# Patient Record
Sex: MALE | Race: WHITE | NOT HISPANIC OR LATINO | Employment: FULL TIME | URBAN - METROPOLITAN AREA
[De-identification: names, ages, dates, MRNs, and addresses within clinical notes are randomized per-mention and may not be internally consistent; named-entity substitution may affect disease eponyms.]

---

## 2017-02-20 ENCOUNTER — OFFICE VISIT (OUTPATIENT)
Dept: URGENT CARE | Facility: CLINIC | Age: 57
End: 2017-02-20
Payer: COMMERCIAL

## 2017-02-20 PROCEDURE — 99203 OFFICE O/P NEW LOW 30 MIN: CPT

## 2018-11-13 ENCOUNTER — OFFICE VISIT (OUTPATIENT)
Dept: URGENT CARE | Facility: CLINIC | Age: 58
End: 2018-11-13
Payer: COMMERCIAL

## 2018-11-13 VITALS
HEIGHT: 68 IN | TEMPERATURE: 97.6 F | OXYGEN SATURATION: 98 % | RESPIRATION RATE: 18 BRPM | SYSTOLIC BLOOD PRESSURE: 128 MMHG | DIASTOLIC BLOOD PRESSURE: 78 MMHG | WEIGHT: 180 LBS | BODY MASS INDEX: 27.28 KG/M2 | HEART RATE: 86 BPM

## 2018-11-13 DIAGNOSIS — J01.90 ACUTE SINUSITIS, RECURRENCE NOT SPECIFIED, UNSPECIFIED LOCATION: Primary | ICD-10-CM

## 2018-11-13 PROCEDURE — 99213 OFFICE O/P EST LOW 20 MIN: CPT | Performed by: PHYSICIAN ASSISTANT

## 2018-11-13 RX ORDER — AMOXICILLIN AND CLAVULANATE POTASSIUM 875; 125 MG/1; MG/1
1 TABLET, FILM COATED ORAL EVERY 12 HOURS SCHEDULED
Qty: 20 TABLET | Refills: 0 | Status: SHIPPED | OUTPATIENT
Start: 2018-11-13 | End: 2018-11-23

## 2018-11-13 RX ORDER — MULTIVIT-MINERALS/FA/LYCOPENE 0.4 MG-6
TABLET ORAL
COMMUNITY

## 2018-11-13 NOTE — PROGRESS NOTES
[facility]        NAME: Teresa Torrez is a 62 y o  male  : 1960    MRN: [id number]  DATE: 2018  TIME: 10:37 AM    Assessment and Plan   Acute sinusitis, recurrence not specified, unspecified location [J01 90]  1  Acute sinusitis, recurrence not specified, unspecified location  amoxicillin-clavulanate (AUGMENTIN) 875-125 mg per tablet         Patient Instructions     Follow up with PCP in 3-5 days  Proceed to  ER if symptoms worsen  Chief Complaint     Chief Complaint   Patient presents with    Cold Like Symptoms     C/O sinus congestion, post nasal drip, cough x 2 days  History of Present Illness       62 y o  Male presents with sinus congestion and pressure x 2 days  Patient states the symptoms are worsening as he gets this once a year and is treated with Augmentin  Has tried a OTC nasal spray with one day of relief  Denies fever, chills, n/v         Review of Systems   Review of Systems   Constitutional: Negative for chills, fatigue and fever  HENT: Positive for congestion, postnasal drip and sinus pain  Negative for ear pain, sore throat and trouble swallowing  Eyes: Negative for pain, discharge and redness  Respiratory: Positive for cough  Negative for chest tightness, shortness of breath and wheezing  Cardiovascular: Negative for chest pain, palpitations and leg swelling  Gastrointestinal: Negative for abdominal pain, diarrhea, nausea and vomiting  Musculoskeletal: Negative for arthralgias, joint swelling and myalgias  Skin: Negative for rash  Neurological: Negative for dizziness, weakness, numbness and headaches           Current Medications       Current Outpatient Prescriptions:     amoxicillin-clavulanate (AUGMENTIN) 875-125 mg per tablet, Take 1 tablet by mouth every 12 (twelve) hours for 10 days, Disp: 20 tablet, Rfl: 0    Multiple Vitamins-Minerals (PX MENS MULTIVITAMINS) TABS, Take by mouth, Disp: , Rfl:     Current Allergies Allergies as of 11/13/2018 - Reviewed 11/13/2018   Allergen Reaction Noted    Tetracycline  02/20/2017            The following portions of the patient's history were reviewed and updated as appropriate: allergies, current medications, past family history, past medical history, past social history, past surgical history and problem list      History reviewed  No pertinent past medical history  Past Surgical History:   Procedure Laterality Date    ORIF FINGER / THUMB FRACTURE         No family history on file  Medications have been verified  Objective   /78   Pulse 86   Temp 97 6 °F (36 4 °C) (Tympanic)   Resp 18   Ht 5' 8" (1 727 m)   Wt 81 6 kg (180 lb)   SpO2 98%   BMI 27 37 kg/m²        Physical Exam     Physical Exam   Constitutional: He is oriented to person, place, and time  He appears well-developed and well-nourished  No distress  HENT:   Head: Normocephalic  Right Ear: Tympanic membrane and external ear normal    Left Ear: Tympanic membrane and external ear normal    Nose: Nose normal    Mouth/Throat: Uvula is midline and mucous membranes are normal  Posterior oropharyngeal erythema (post nasal drip posterior pharynx) present  Eyes: Pupils are equal, round, and reactive to light  Conjunctivae and EOM are normal    Neck: Normal range of motion  Neck supple  Cardiovascular: Normal rate, regular rhythm and normal heart sounds  No murmur heard  Pulmonary/Chest: Effort normal and breath sounds normal  No respiratory distress  He has no wheezes  Abdominal: Soft  Bowel sounds are normal  There is no tenderness  Musculoskeletal: Normal range of motion  Lymphadenopathy:     He has no cervical adenopathy  Neurological: He is alert and oriented to person, place, and time  He has normal reflexes  Skin: Skin is warm and dry  Psychiatric: He has a normal mood and affect  Nursing note and vitals reviewed

## 2019-04-25 ENCOUNTER — TRANSCRIBE ORDERS (OUTPATIENT)
Dept: URGENT CARE | Facility: CLINIC | Age: 59
End: 2019-04-25

## 2019-04-25 ENCOUNTER — APPOINTMENT (OUTPATIENT)
Dept: LAB | Facility: CLINIC | Age: 59
End: 2019-04-25
Payer: COMMERCIAL

## 2019-04-25 DIAGNOSIS — E78.5 HYPERLIPIDEMIA, UNSPECIFIED HYPERLIPIDEMIA TYPE: ICD-10-CM

## 2019-04-25 DIAGNOSIS — N42.9 DISORDER OF PROSTATE: ICD-10-CM

## 2019-04-25 DIAGNOSIS — R53.83 FATIGUE, UNSPECIFIED TYPE: ICD-10-CM

## 2019-04-25 DIAGNOSIS — Z79.1 NSAID LONG-TERM USE: Primary | ICD-10-CM

## 2019-04-25 LAB
ALBUMIN SERPL BCP-MCNC: 3.7 G/DL (ref 3.5–5)
ALP SERPL-CCNC: 66 U/L (ref 46–116)
ALT SERPL W P-5'-P-CCNC: 49 U/L (ref 12–78)
ANION GAP SERPL CALCULATED.3IONS-SCNC: 4 MMOL/L (ref 4–13)
AST SERPL W P-5'-P-CCNC: 39 U/L (ref 5–45)
BILIRUB SERPL-MCNC: 0.45 MG/DL (ref 0.2–1)
BUN SERPL-MCNC: 16 MG/DL (ref 5–25)
CALCIUM SERPL-MCNC: 8.7 MG/DL (ref 8.3–10.1)
CHLORIDE SERPL-SCNC: 107 MMOL/L (ref 100–108)
CHOLEST SERPL-MCNC: 204 MG/DL (ref 50–200)
CO2 SERPL-SCNC: 26 MMOL/L (ref 21–32)
CREAT SERPL-MCNC: 1.11 MG/DL (ref 0.6–1.3)
ERYTHROCYTE [DISTWIDTH] IN BLOOD BY AUTOMATED COUNT: 13.4 % (ref 11.6–15.1)
GFR SERPL CREATININE-BSD FRML MDRD: 73 ML/MIN/1.73SQ M
GLUCOSE P FAST SERPL-MCNC: 111 MG/DL (ref 65–99)
HCT VFR BLD AUTO: 41.8 % (ref 36.5–49.3)
HDLC SERPL-MCNC: 49 MG/DL (ref 40–60)
HGB BLD-MCNC: 14.1 G/DL (ref 12–17)
LDLC SERPL CALC-MCNC: 138 MG/DL (ref 0–100)
MCH RBC QN AUTO: 31 PG (ref 26.8–34.3)
MCHC RBC AUTO-ENTMCNC: 33.7 G/DL (ref 31.4–37.4)
MCV RBC AUTO: 92 FL (ref 82–98)
NONHDLC SERPL-MCNC: 155 MG/DL
PLATELET # BLD AUTO: 271 THOUSANDS/UL (ref 149–390)
PMV BLD AUTO: 10.5 FL (ref 8.9–12.7)
POTASSIUM SERPL-SCNC: 4.3 MMOL/L (ref 3.5–5.3)
PROT SERPL-MCNC: 7.4 G/DL (ref 6.4–8.2)
PSA SERPL-MCNC: 0.2 NG/ML (ref 0–4)
RBC # BLD AUTO: 4.55 MILLION/UL (ref 3.88–5.62)
SODIUM SERPL-SCNC: 137 MMOL/L (ref 136–145)
T4 FREE SERPL-MCNC: 0.88 NG/DL (ref 0.76–1.46)
TRIGL SERPL-MCNC: 85 MG/DL
TSH SERPL DL<=0.05 MIU/L-ACNC: 3.33 UIU/ML (ref 0.36–3.74)
WBC # BLD AUTO: 7.68 THOUSAND/UL (ref 4.31–10.16)

## 2019-04-25 PROCEDURE — 84439 ASSAY OF FREE THYROXINE: CPT

## 2019-04-25 PROCEDURE — G0103 PSA SCREENING: HCPCS

## 2019-04-25 PROCEDURE — 85027 COMPLETE CBC AUTOMATED: CPT

## 2019-04-25 PROCEDURE — 36415 COLL VENOUS BLD VENIPUNCTURE: CPT

## 2019-04-25 PROCEDURE — 80061 LIPID PANEL: CPT

## 2019-04-25 PROCEDURE — 84443 ASSAY THYROID STIM HORMONE: CPT

## 2019-04-25 PROCEDURE — 80053 COMPREHEN METABOLIC PANEL: CPT

## 2019-07-19 ENCOUNTER — APPOINTMENT (OUTPATIENT)
Dept: LAB | Facility: CLINIC | Age: 59
End: 2019-07-19
Payer: COMMERCIAL

## 2019-07-19 ENCOUNTER — TRANSCRIBE ORDERS (OUTPATIENT)
Dept: LAB | Facility: CLINIC | Age: 59
End: 2019-07-19

## 2019-07-19 DIAGNOSIS — E78.5 HYPERLIPIDEMIA, UNSPECIFIED HYPERLIPIDEMIA TYPE: ICD-10-CM

## 2019-07-19 DIAGNOSIS — Z11.59 NEED FOR HEPATITIS C SCREENING TEST: ICD-10-CM

## 2019-07-19 DIAGNOSIS — E78.5 HYPERLIPIDEMIA, UNSPECIFIED HYPERLIPIDEMIA TYPE: Primary | ICD-10-CM

## 2019-07-19 LAB
CHOLEST SERPL-MCNC: 188 MG/DL (ref 50–200)
HDLC SERPL-MCNC: 63 MG/DL (ref 40–60)
LDLC SERPL CALC-MCNC: 104 MG/DL (ref 0–100)
NONHDLC SERPL-MCNC: 125 MG/DL
TRIGL SERPL-MCNC: 104 MG/DL

## 2019-07-19 PROCEDURE — 80061 LIPID PANEL: CPT

## 2019-07-19 PROCEDURE — 36415 COLL VENOUS BLD VENIPUNCTURE: CPT

## 2019-07-19 PROCEDURE — 86803 HEPATITIS C AB TEST: CPT

## 2019-07-20 LAB — HCV AB SER QL: NORMAL

## 2019-09-19 ENCOUNTER — TRANSCRIBE ORDERS (OUTPATIENT)
Dept: ADMINISTRATIVE | Facility: HOSPITAL | Age: 59
End: 2019-09-19

## 2019-09-19 DIAGNOSIS — G56.03 BILATERAL CARPAL TUNNEL SYNDROME: Primary | ICD-10-CM

## 2019-10-15 ENCOUNTER — HOSPITAL ENCOUNTER (OUTPATIENT)
Dept: NEUROLOGY | Facility: CLINIC | Age: 59
Discharge: HOME | End: 2019-10-15
Payer: COMMERCIAL

## 2019-10-15 DIAGNOSIS — G56.03 BILATERAL CARPAL TUNNEL SYNDROME: ICD-10-CM

## 2019-10-15 PROCEDURE — 95912 NRV CNDJ TEST 11-12 STUDIES: CPT | Performed by: PHYSICAL MEDICINE & REHABILITATION

## 2019-10-15 PROCEDURE — 95886 MUSC TEST DONE W/N TEST COMP: CPT | Performed by: PHYSICAL MEDICINE & REHABILITATION

## 2021-02-10 ENCOUNTER — LAB (OUTPATIENT)
Dept: LAB | Facility: CLINIC | Age: 61
End: 2021-02-10
Payer: COMMERCIAL

## 2021-02-10 ENCOUNTER — TRANSCRIBE ORDERS (OUTPATIENT)
Dept: URGENT CARE | Facility: CLINIC | Age: 61
End: 2021-02-10

## 2021-02-10 DIAGNOSIS — Z79.1 ENCOUNTER FOR LONG-TERM (CURRENT) USE OF NON-STEROIDAL ANTI-INFLAMMATORIES: Primary | ICD-10-CM

## 2021-02-10 DIAGNOSIS — E78.5 HYPERLIPIDEMIA, UNSPECIFIED HYPERLIPIDEMIA TYPE: ICD-10-CM

## 2021-02-10 DIAGNOSIS — N42.9 DISEASE OF PROSTATE: ICD-10-CM

## 2021-02-10 LAB
ALBUMIN SERPL BCP-MCNC: 4.3 G/DL (ref 3.5–5)
ALP SERPL-CCNC: 66 U/L (ref 46–116)
ALT SERPL W P-5'-P-CCNC: 43 U/L (ref 12–78)
ANION GAP SERPL CALCULATED.3IONS-SCNC: 7 MMOL/L (ref 4–13)
AST SERPL W P-5'-P-CCNC: 26 U/L (ref 5–45)
BILIRUB SERPL-MCNC: 0.5 MG/DL (ref 0.2–1)
BUN SERPL-MCNC: 15 MG/DL (ref 5–25)
CALCIUM SERPL-MCNC: 9.2 MG/DL (ref 8.3–10.1)
CHLORIDE SERPL-SCNC: 108 MMOL/L (ref 100–108)
CHOLEST SERPL-MCNC: 183 MG/DL (ref 50–200)
CO2 SERPL-SCNC: 25 MMOL/L (ref 21–32)
CREAT SERPL-MCNC: 1.05 MG/DL (ref 0.6–1.3)
ERYTHROCYTE [DISTWIDTH] IN BLOOD BY AUTOMATED COUNT: 13.3 % (ref 11.6–15.1)
GFR SERPL CREATININE-BSD FRML MDRD: 77 ML/MIN/1.73SQ M
GLUCOSE P FAST SERPL-MCNC: 119 MG/DL (ref 65–99)
HCT VFR BLD AUTO: 43.4 % (ref 36.5–49.3)
HDLC SERPL-MCNC: 53 MG/DL
HGB BLD-MCNC: 14.6 G/DL (ref 12–17)
LDLC SERPL CALC-MCNC: 114 MG/DL (ref 0–100)
MCH RBC QN AUTO: 30.9 PG (ref 26.8–34.3)
MCHC RBC AUTO-ENTMCNC: 33.6 G/DL (ref 31.4–37.4)
MCV RBC AUTO: 92 FL (ref 82–98)
NONHDLC SERPL-MCNC: 130 MG/DL
PLATELET # BLD AUTO: 249 THOUSANDS/UL (ref 149–390)
PMV BLD AUTO: 10.3 FL (ref 8.9–12.7)
POTASSIUM SERPL-SCNC: 4.1 MMOL/L (ref 3.5–5.3)
PROT SERPL-MCNC: 7.4 G/DL (ref 6.4–8.2)
PSA SERPL-MCNC: 0.2 NG/ML (ref 0–4)
RBC # BLD AUTO: 4.73 MILLION/UL (ref 3.88–5.62)
SODIUM SERPL-SCNC: 140 MMOL/L (ref 136–145)
TRIGL SERPL-MCNC: 78 MG/DL
WBC # BLD AUTO: 7.55 THOUSAND/UL (ref 4.31–10.16)

## 2021-02-10 PROCEDURE — 85027 COMPLETE CBC AUTOMATED: CPT

## 2021-02-10 PROCEDURE — G0103 PSA SCREENING: HCPCS

## 2021-02-10 PROCEDURE — 80061 LIPID PANEL: CPT

## 2021-02-10 PROCEDURE — 36415 COLL VENOUS BLD VENIPUNCTURE: CPT

## 2021-02-10 PROCEDURE — 80053 COMPREHEN METABOLIC PANEL: CPT

## 2021-08-30 ENCOUNTER — APPOINTMENT (OUTPATIENT)
Dept: RADIOLOGY | Facility: CLINIC | Age: 61
End: 2021-08-30
Payer: COMMERCIAL

## 2021-08-30 ENCOUNTER — APPOINTMENT (OUTPATIENT)
Dept: LAB | Facility: CLINIC | Age: 61
End: 2021-08-30
Payer: COMMERCIAL

## 2021-08-30 DIAGNOSIS — M79.672 LEFT FOOT PAIN: ICD-10-CM

## 2021-08-30 PROCEDURE — 73630 X-RAY EXAM OF FOOT: CPT

## 2021-09-03 ENCOUNTER — APPOINTMENT (OUTPATIENT)
Dept: LAB | Facility: CLINIC | Age: 61
End: 2021-09-03
Payer: COMMERCIAL

## 2021-09-09 ENCOUNTER — APPOINTMENT (OUTPATIENT)
Dept: LAB | Facility: CLINIC | Age: 61
End: 2021-09-09
Payer: COMMERCIAL

## 2022-03-10 ENCOUNTER — APPOINTMENT (OUTPATIENT)
Dept: LAB | Facility: CLINIC | Age: 62
End: 2022-03-10
Payer: COMMERCIAL

## 2022-10-27 ENCOUNTER — APPOINTMENT (OUTPATIENT)
Dept: LAB | Facility: CLINIC | Age: 62
End: 2022-10-27
Payer: COMMERCIAL

## 2022-10-27 DIAGNOSIS — R23.2 HOT FLASH IN MALE: ICD-10-CM

## 2022-10-27 DIAGNOSIS — E55.9 VITAMIN D DEFICIENCY: ICD-10-CM

## 2022-10-27 DIAGNOSIS — R23.2 FLUSHING: ICD-10-CM

## 2022-10-27 LAB
25(OH)D3 SERPL-MCNC: 62.3 NG/ML (ref 30–100)
ALBUMIN SERPL BCP-MCNC: 3.6 G/DL (ref 3.5–5)
ALP SERPL-CCNC: 64 U/L (ref 46–116)
ALT SERPL W P-5'-P-CCNC: 36 U/L (ref 12–78)
ANION GAP SERPL CALCULATED.3IONS-SCNC: 4 MMOL/L (ref 4–13)
AST SERPL W P-5'-P-CCNC: 22 U/L (ref 5–45)
BASOPHILS # BLD AUTO: 0.07 THOUSANDS/ÂΜL (ref 0–0.1)
BASOPHILS NFR BLD AUTO: 1 % (ref 0–1)
BILIRUB SERPL-MCNC: 0.44 MG/DL (ref 0.2–1)
BUN SERPL-MCNC: 15 MG/DL (ref 5–25)
CALCIUM SERPL-MCNC: 9.5 MG/DL (ref 8.3–10.1)
CHLORIDE SERPL-SCNC: 108 MMOL/L (ref 96–108)
CO2 SERPL-SCNC: 26 MMOL/L (ref 21–32)
CREAT SERPL-MCNC: 1.05 MG/DL (ref 0.6–1.3)
EOSINOPHIL # BLD AUTO: 0.52 THOUSAND/ÂΜL (ref 0–0.61)
EOSINOPHIL NFR BLD AUTO: 7 % (ref 0–6)
ERYTHROCYTE [DISTWIDTH] IN BLOOD BY AUTOMATED COUNT: 13.2 % (ref 11.6–15.1)
FSH SERPL-ACNC: 12.6 MIU/ML (ref 0.7–10.8)
GFR SERPL CREATININE-BSD FRML MDRD: 75 ML/MIN/1.73SQ M
GLUCOSE P FAST SERPL-MCNC: 151 MG/DL (ref 65–99)
HCT VFR BLD AUTO: 42.1 % (ref 36.5–49.3)
HGB BLD-MCNC: 14 G/DL (ref 12–17)
IMM GRANULOCYTES # BLD AUTO: 0.03 THOUSAND/UL (ref 0–0.2)
IMM GRANULOCYTES NFR BLD AUTO: 0 % (ref 0–2)
LH SERPL-ACNC: 5 MIU/ML (ref 1.2–10.6)
LYMPHOCYTES # BLD AUTO: 2.67 THOUSANDS/ÂΜL (ref 0.6–4.47)
LYMPHOCYTES NFR BLD AUTO: 36 % (ref 14–44)
MCH RBC QN AUTO: 30.5 PG (ref 26.8–34.3)
MCHC RBC AUTO-ENTMCNC: 33.3 G/DL (ref 31.4–37.4)
MCV RBC AUTO: 92 FL (ref 82–98)
MONOCYTES # BLD AUTO: 0.69 THOUSAND/ÂΜL (ref 0.17–1.22)
MONOCYTES NFR BLD AUTO: 9 % (ref 4–12)
NEUTROPHILS # BLD AUTO: 3.5 THOUSANDS/ÂΜL (ref 1.85–7.62)
NEUTS SEG NFR BLD AUTO: 47 % (ref 43–75)
NRBC BLD AUTO-RTO: 0 /100 WBCS
PLATELET # BLD AUTO: 286 THOUSANDS/UL (ref 149–390)
PMV BLD AUTO: 10.3 FL (ref 8.9–12.7)
POTASSIUM SERPL-SCNC: 4.9 MMOL/L (ref 3.5–5.3)
PROLACTIN SERPL-MCNC: 8.7 NG/ML (ref 2.5–17.4)
PROT SERPL-MCNC: 7.2 G/DL (ref 6.4–8.4)
RBC # BLD AUTO: 4.59 MILLION/UL (ref 3.88–5.62)
SODIUM SERPL-SCNC: 138 MMOL/L (ref 135–147)
TESTOST SERPL-MCNC: 377 NG/DL (ref 95–948)
TSH SERPL DL<=0.05 MIU/L-ACNC: 2.17 UIU/ML (ref 0.45–4.5)
WBC # BLD AUTO: 7.48 THOUSAND/UL (ref 4.31–10.16)

## 2022-10-27 PROCEDURE — 85025 COMPLETE CBC W/AUTO DIFF WBC: CPT

## 2022-10-27 PROCEDURE — 83002 ASSAY OF GONADOTROPIN (LH): CPT

## 2022-10-27 PROCEDURE — 84443 ASSAY THYROID STIM HORMONE: CPT

## 2022-10-27 PROCEDURE — 84403 ASSAY OF TOTAL TESTOSTERONE: CPT

## 2022-10-27 PROCEDURE — 36415 COLL VENOUS BLD VENIPUNCTURE: CPT

## 2022-10-27 PROCEDURE — 84146 ASSAY OF PROLACTIN: CPT

## 2022-10-27 PROCEDURE — 82306 VITAMIN D 25 HYDROXY: CPT

## 2022-10-27 PROCEDURE — 80053 COMPREHEN METABOLIC PANEL: CPT

## 2022-10-27 PROCEDURE — 83001 ASSAY OF GONADOTROPIN (FSH): CPT

## 2023-03-22 ENCOUNTER — OFFICE VISIT (OUTPATIENT)
Dept: URGENT CARE | Facility: CLINIC | Age: 63
End: 2023-03-22

## 2023-03-22 VITALS
HEART RATE: 95 BPM | RESPIRATION RATE: 20 BRPM | OXYGEN SATURATION: 99 % | TEMPERATURE: 98.3 F | BODY MASS INDEX: 28.13 KG/M2 | WEIGHT: 185 LBS

## 2023-03-22 DIAGNOSIS — J02.9 SORE THROAT: ICD-10-CM

## 2023-03-22 DIAGNOSIS — B34.9 VIRAL ILLNESS: Primary | ICD-10-CM

## 2023-03-22 LAB — S PYO AG THROAT QL: NEGATIVE

## 2023-03-22 RX ORDER — FLUTICASONE PROPIONATE 50 MCG
1 SPRAY, SUSPENSION (ML) NASAL DAILY
Qty: 11.1 ML | Refills: 0 | Status: SHIPPED | OUTPATIENT
Start: 2023-03-22

## 2023-03-22 NOTE — PROGRESS NOTES
[facility]      NAME: Anju Fan is a 58 y o  male  : 1960    MRN: [id number]  DATE: 2023  TIME: 11:19 AM    Assessment and Plan   Viral illness [B34 9]  1  Viral illness  fluticasone (FLONASE) 50 mcg/act nasal spray      2  Sore throat  POCT rapid strepA    Throat culture          Patient Instructions   Declines covid testing  Infection appears viral   Recommend symptomatic treatment  Can take ibuprofen or tylenol as needed for pain or fever  Over the counter cough and cold medications to help with symptoms  Use salt water gargles for sore throat and throat lozenges  Cough drops as needed  Wash hands frequently to prevent the spread of infection  If not improving over the next 7-10 days, follow up with PCP  Symptoms may persist for 10-14 days  To present to the ER if symptoms worsen  Chief Complaint     Chief Complaint   Patient presents with   • sinus pressure         History of Present Illness   Anju Fan presents to the clinic c/o    Sinusitis  This is a new problem  The current episode started in the past 7 days  The problem is unchanged  There has been no fever  Associated symptoms include congestion, coughing, headaches, sinus pressure and a sore throat  Pertinent negatives include no chills, diaphoresis, ear pain or shortness of breath  Past treatments include oral decongestants  The treatment provided mild relief  Review of Systems   Review of Systems   Constitutional: Negative for chills, diaphoresis, fatigue and fever  HENT: Positive for congestion, postnasal drip, sinus pressure, sinus pain and sore throat  Negative for ear discharge, ear pain and facial swelling  Eyes: Negative for photophobia, pain, discharge, redness, itching and visual disturbance  Respiratory: Positive for cough  Negative for apnea, chest tightness, shortness of breath and wheezing  Cardiovascular: Negative for chest pain and palpitations     Gastrointestinal: Negative for abdominal pain  Skin: Negative for color change, rash and wound  Neurological: Positive for headaches  Negative for dizziness  Hematological: Negative for adenopathy  Current Medications     Long-Term Medications   Medication Sig Dispense Refill   • fluticasone (FLONASE) 50 mcg/act nasal spray 1 spray into each nostril daily 11 1 mL 0   • Multiple Vitamins-Minerals (PX MENS MULTIVITAMINS) TABS Take by mouth         Current Allergies     Allergies as of 03/22/2023 - Reviewed 03/22/2023   Allergen Reaction Noted   • Tetracycline  02/20/2017            The following portions of the patient's history were reviewed and updated as appropriate: allergies, current medications, past family history, past medical history, past social history, past surgical history and problem list   No past medical history on file  Past Surgical History:   Procedure Laterality Date   • ORIF FINGER / THUMB FRACTURE       Social History     Socioeconomic History   • Marital status: Single     Spouse name: Not on file   • Number of children: Not on file   • Years of education: Not on file   • Highest education level: Not on file   Occupational History   • Not on file   Tobacco Use   • Smoking status: Never   • Smokeless tobacco: Never   Substance and Sexual Activity   • Alcohol use: Not on file   • Drug use: Not on file   • Sexual activity: Not on file   Other Topics Concern   • Not on file   Social History Narrative   • Not on file     Social Determinants of Health     Financial Resource Strain: Not on file   Food Insecurity: Not on file   Transportation Needs: Not on file   Physical Activity: Not on file   Stress: Not on file   Social Connections: Not on file   Intimate Partner Violence: Not on file   Housing Stability: Not on file       Objective   Pulse 95   Temp 98 3 °F (36 8 °C)   Resp 20   Wt 83 9 kg (185 lb)   SpO2 99%   BMI 28 13 kg/m²      Physical Exam     Physical Exam  Vitals and nursing note reviewed  Constitutional:       General: He is not in acute distress  Appearance: He is well-developed  He is not diaphoretic  HENT:      Head: Normocephalic and atraumatic  Right Ear: Tympanic membrane and external ear normal       Left Ear: Tympanic membrane and external ear normal       Nose: Nose normal       Right Sinus: No maxillary sinus tenderness or frontal sinus tenderness  Left Sinus: No maxillary sinus tenderness or frontal sinus tenderness  Mouth/Throat:      Mouth: Mucous membranes are moist       Pharynx: No oropharyngeal exudate or posterior oropharyngeal erythema  Eyes:      General: No scleral icterus  Right eye: No discharge  Left eye: No discharge  Conjunctiva/sclera: Conjunctivae normal    Cardiovascular:      Rate and Rhythm: Normal rate and regular rhythm  Heart sounds: Normal heart sounds  No murmur heard  No friction rub  No gallop  Pulmonary:      Effort: Pulmonary effort is normal  No respiratory distress  Breath sounds: Normal breath sounds  No decreased breath sounds, wheezing, rhonchi or rales  Skin:     General: Skin is warm and dry  Coloration: Skin is not pale  Findings: No erythema or rash  Neurological:      Mental Status: He is alert and oriented to person, place, and time  Psychiatric:         Behavior: Behavior normal          Thought Content:  Thought content normal          Judgment: Judgment normal          Tomas Wen PA-C

## 2023-03-24 LAB — BACTERIA THROAT CULT: NORMAL

## 2023-06-16 ENCOUNTER — APPOINTMENT (OUTPATIENT)
Age: 63
End: 2023-06-16
Payer: COMMERCIAL

## 2023-06-16 DIAGNOSIS — N42.9 PROSTATIC DISORDER: ICD-10-CM

## 2023-06-16 DIAGNOSIS — R53.83 FATIGUE, UNSPECIFIED TYPE: ICD-10-CM

## 2023-06-16 DIAGNOSIS — E78.5 HYPERLIPIDEMIA, UNSPECIFIED HYPERLIPIDEMIA TYPE: ICD-10-CM

## 2023-06-16 LAB
ALBUMIN SERPL BCP-MCNC: 4.1 G/DL (ref 3.5–5)
ALP SERPL-CCNC: 66 U/L (ref 46–116)
ALT SERPL W P-5'-P-CCNC: 40 U/L (ref 12–78)
ANION GAP SERPL CALCULATED.3IONS-SCNC: 5 MMOL/L (ref 4–13)
AST SERPL W P-5'-P-CCNC: 30 U/L (ref 5–45)
BILIRUB SERPL-MCNC: 0.47 MG/DL (ref 0.2–1)
BUN SERPL-MCNC: 12 MG/DL (ref 5–25)
CALCIUM SERPL-MCNC: 9.5 MG/DL (ref 8.3–10.1)
CHLORIDE SERPL-SCNC: 110 MMOL/L (ref 96–108)
CHOLEST SERPL-MCNC: 227 MG/DL
CO2 SERPL-SCNC: 26 MMOL/L (ref 21–32)
CREAT SERPL-MCNC: 1.04 MG/DL (ref 0.6–1.3)
ERYTHROCYTE [DISTWIDTH] IN BLOOD BY AUTOMATED COUNT: 13.7 % (ref 11.6–15.1)
GFR SERPL CREATININE-BSD FRML MDRD: 76 ML/MIN/1.73SQ M
GLUCOSE P FAST SERPL-MCNC: 116 MG/DL (ref 65–99)
HCT VFR BLD AUTO: 41.9 % (ref 36.5–49.3)
HDLC SERPL-MCNC: 50 MG/DL
HGB BLD-MCNC: 14.6 G/DL (ref 12–17)
LDLC SERPL CALC-MCNC: 148 MG/DL (ref 0–100)
MCH RBC QN AUTO: 31.3 PG (ref 26.8–34.3)
MCHC RBC AUTO-ENTMCNC: 34.8 G/DL (ref 31.4–37.4)
MCV RBC AUTO: 90 FL (ref 82–98)
NONHDLC SERPL-MCNC: 177 MG/DL
PLATELET # BLD AUTO: 237 THOUSANDS/UL (ref 149–390)
PMV BLD AUTO: 10.3 FL (ref 8.9–12.7)
POTASSIUM SERPL-SCNC: 3.8 MMOL/L (ref 3.5–5.3)
PROT SERPL-MCNC: 7.5 G/DL (ref 6.4–8.4)
PSA SERPL-MCNC: 0.26 NG/ML (ref 0–4)
RBC # BLD AUTO: 4.67 MILLION/UL (ref 3.88–5.62)
SODIUM SERPL-SCNC: 141 MMOL/L (ref 135–147)
TRIGL SERPL-MCNC: 147 MG/DL
WBC # BLD AUTO: 6.72 THOUSAND/UL (ref 4.31–10.16)

## 2023-06-16 PROCEDURE — 85027 COMPLETE CBC AUTOMATED: CPT

## 2023-06-16 PROCEDURE — G0103 PSA SCREENING: HCPCS

## 2023-06-16 PROCEDURE — 80061 LIPID PANEL: CPT

## 2023-06-16 PROCEDURE — 80053 COMPREHEN METABOLIC PANEL: CPT

## 2023-06-16 PROCEDURE — 36415 COLL VENOUS BLD VENIPUNCTURE: CPT

## 2023-09-26 ENCOUNTER — OFFICE VISIT (OUTPATIENT)
Dept: URGENT CARE | Facility: CLINIC | Age: 63
End: 2023-09-26
Payer: COMMERCIAL

## 2023-09-26 VITALS
DIASTOLIC BLOOD PRESSURE: 70 MMHG | HEART RATE: 80 BPM | OXYGEN SATURATION: 97 % | TEMPERATURE: 97.9 F | SYSTOLIC BLOOD PRESSURE: 132 MMHG | RESPIRATION RATE: 18 BRPM

## 2023-09-26 DIAGNOSIS — L25.9 CONTACT DERMATITIS, UNSPECIFIED CONTACT DERMATITIS TYPE, UNSPECIFIED TRIGGER: Primary | ICD-10-CM

## 2023-09-26 PROCEDURE — 99213 OFFICE O/P EST LOW 20 MIN: CPT

## 2023-09-26 RX ORDER — GABAPENTIN 300 MG/1
CAPSULE ORAL
COMMUNITY
Start: 2023-08-18

## 2023-09-26 RX ORDER — PREDNISONE 10 MG/1
TABLET ORAL
Qty: 15 TABLET | Refills: 0 | Status: SHIPPED | OUTPATIENT
Start: 2023-09-26 | End: 2023-09-30

## 2023-09-26 RX ORDER — OXYCODONE HYDROCHLORIDE 5 MG/1
TABLET ORAL
COMMUNITY

## 2023-09-26 RX ORDER — PRAVASTATIN SODIUM 40 MG
TABLET ORAL
COMMUNITY

## 2023-09-26 NOTE — PROGRESS NOTES
[facility]        NAME: Stevo Hernandez is a 61 y.o. male  : 1960    MRN: [id number]  DATE: 2023  TIME: 6:57 PM    Assessment and Plan   Contact dermatitis, unspecified contact dermatitis type, unspecified trigger [L25.9]  1. Contact dermatitis, unspecified contact dermatitis type, unspecified trigger  predniSONE 10 mg tablet    hydrocortisone 2.5 % cream            Patient Instructions     Take prednisone as prescribed. Recommend taking in the morning, with food. If you are taking an anti-inflammatory such as Aleve or ibuprofen/Motrin, recommend stopping or only using over-the-counter doses while you are on higher doses of prednisone (40mg - 60mg). May use Benadryl 25mg - 50mg every 4-6 hours as needing for itching. Be careful of drowsiness, do not take before driving or operating heavy machinery. Use unscented/sensitive formula soaps, lotions, and detergents. Limit hot showers. Follow up with your PCP in 5-7 days. Go to the ED if symptoms significantly worsen. Chief Complaint     Chief Complaint   Patient presents with   • Rash     C/o rash bilateral legs onset "last night", pt reports "my leg feels like it was burning and I noticed this rash". Reports putting "cortisone cream on last night", denies any OTC medications today. Rash observed reddened and raised in large areas. Skin intact, denies drainage. Denies any new exposure, new allergies that pt is aware. Denies ASA/Anticoags. Denies any new medication regimen. Denies PCP contact. History of Present Illness       This is a 80-year-old male who presents with a rash to both legs that started last night. Patient states he suddenly felt a burning sensation and when he took his jeans off noticed the rash. It is red and slightly raised, not itchy. No open or draining areas. Patient denies new skin care products, soaps, detergents, medications, foods, and exposure to new plants or animals.  Applied cortisone cream last night. No treatments PTA. Review of Systems   Review of Systems   Constitutional: Negative for chills and fever. Respiratory: Negative for chest tightness and shortness of breath. Cardiovascular: Negative for chest pain and palpitations. Musculoskeletal: Negative for arthralgias and myalgias. Skin: Positive for rash. Neurological: Negative for dizziness, light-headedness and headaches. Current Medications       Current Outpatient Medications:   •  hydrocortisone 2.5 % cream, Apply topically 2 (two) times a day as needed for rash, Disp: 28 g, Rfl: 0  •  predniSONE 10 mg tablet, Take 5 tablets (50 mg total) by mouth daily for 1 day, THEN 4 tablets (40 mg total) daily for 1 day, THEN 3 tablets (30 mg total) daily for 1 day, THEN 2 tablets (20 mg total) daily for 1 day, THEN 1 tablet (10 mg total) daily for 1 day., Disp: 15 tablet, Rfl: 0  •  fluticasone (FLONASE) 50 mcg/act nasal spray, 1 spray into each nostril daily, Disp: 11.1 mL, Rfl: 0  •  gabapentin (NEURONTIN) 300 mg capsule, , Disp: , Rfl:   •  Multiple Vitamins-Minerals (PX MENS MULTIVITAMINS) TABS, Take by mouth, Disp: , Rfl:   •  oxyCODONE (ROXICODONE) 5 immediate release tablet, Take 1 tablet by mouth every 4 hours as needed for postoperative pain. Max 6 tablets per day.  (Patient not taking: Reported on 9/26/2023), Disp: , Rfl:   •  pravastatin (PRAVACHOL) 40 mg tablet, , Disp: , Rfl:     Current Allergies     Allergies as of 09/26/2023 - Reviewed 09/26/2023   Allergen Reaction Noted   • Tetracycline  02/20/2017            The following portions of the patient's history were reviewed and updated as appropriate: allergies, current medications, past family history, past medical history, past social history, past surgical history and problem list.     Past Medical History:   Diagnosis Date   • High cholesterol    • Restless leg syndrome        Past Surgical History:   Procedure Laterality Date   • CARPAL TUNNEL RELEASE Bilateral    • ORIF FINGER / THUMB FRACTURE         History reviewed. No pertinent family history. Medications have been verified. Objective   /70 (BP Location: Right arm, Patient Position: Sitting)   Pulse 80   Temp 97.9 °F (36.6 °C) (Temporal)   Resp 18   SpO2 97%        Physical Exam     Physical Exam  Vitals and nursing note reviewed. Constitutional:       General: He is not in acute distress. HENT:      Head: Normocephalic. Mouth/Throat:      Mouth: Mucous membranes are moist.   Cardiovascular:      Rate and Rhythm: Normal rate and regular rhythm. Pulses: Normal pulses. Heart sounds: Normal heart sounds. Pulmonary:      Effort: Pulmonary effort is normal.      Breath sounds: Normal breath sounds. Musculoskeletal:         General: Normal range of motion. Cervical back: Normal range of motion and neck supple. Skin:     General: Skin is warm and dry. Capillary Refill: Capillary refill takes less than 2 seconds. Findings: Rash present. Rash is macular and papular. Rash is not urticarial or vesicular. Comments: Erythematous macular papular rash to both legs, slightly raised. No warmth. Neurological:      Mental Status: He is alert and oriented to person, place, and time.

## 2023-09-26 NOTE — PATIENT INSTRUCTIONS
Take prednisone as prescribed. Recommend taking in the morning, with food. If you are taking an anti-inflammatory such as Aleve or ibuprofen/Motrin, recommend stopping or only using over-the-counter doses while you are on higher doses of prednisone (40mg - 60mg). May use Benadryl 25mg - 50mg every 4-6 hours as needing for itching. Be careful of drowsiness, do not take before driving or operating heavy machinery. Use unscented/sensitive formula soaps, lotions, and detergents. Limit hot showers. Follow up with your PCP in 5-7 days. Go to the ED if symptoms significantly worsen.

## 2023-12-04 ENCOUNTER — OFFICE VISIT (OUTPATIENT)
Dept: URGENT CARE | Facility: CLINIC | Age: 63
End: 2023-12-04
Payer: COMMERCIAL

## 2023-12-04 VITALS
BODY MASS INDEX: 31.4 KG/M2 | WEIGHT: 207.2 LBS | OXYGEN SATURATION: 97 % | RESPIRATION RATE: 20 BRPM | DIASTOLIC BLOOD PRESSURE: 72 MMHG | TEMPERATURE: 97.8 F | HEART RATE: 86 BPM | SYSTOLIC BLOOD PRESSURE: 120 MMHG | HEIGHT: 68 IN

## 2023-12-04 DIAGNOSIS — J04.0 ACUTE LARYNGITIS: ICD-10-CM

## 2023-12-04 DIAGNOSIS — J06.9 VIRAL URI WITH COUGH: Primary | ICD-10-CM

## 2023-12-04 PROCEDURE — 99214 OFFICE O/P EST MOD 30 MIN: CPT | Performed by: NURSE PRACTITIONER

## 2023-12-04 RX ORDER — PREDNISONE 10 MG/1
TABLET ORAL
Qty: 24 TABLET | Refills: 0 | Status: SHIPPED | OUTPATIENT
Start: 2023-12-04

## 2023-12-04 NOTE — PROGRESS NOTES
[facility]        NAME: Marguarite Ormond is a 61 y.o. male  : 1960    MRN: [id number]  DATE: 2023  TIME: 10:46 AM    Assessment and Plan   Viral URI with cough [J06.9]  1. Viral URI with cough  predniSONE 10 mg tablet      2. Acute laryngitis  predniSONE 10 mg tablet            Patient Instructions       Follow up with PCP in 3-5 days. Proceed to  ER if symptoms worsen. You appear to have a viral upper respiratory infection with cough and laryngitis. You are to take the prednisone as prescribed  You are to do warm salt water gargles 4 x daily. Drink warm tea with honey and lemon. Take tylenol or motrin as able for pain or fever. Chloraseptic throat spray, cough drops. Follow up with your PCP in 3-5 days   Go to the ED if symptoms worsen    Chief Complaint     Chief Complaint   Patient presents with    Cough     Started yesterday , prod for green phlegm. Chest feels tight     Laryngitis     Loss of voice started yesterday          History of Present Illness       This is a 61year old male who states that yesterday he developed yellow productive cough with laryngitis. He has taken OTC w/o relief. He denies fevers, chills, n/v/d. 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 States slight sorethroat. Review of Systems   Review of Systems   Constitutional: Negative. HENT:  Positive for congestion and sore throat. Eyes: Negative. Respiratory:  Positive for cough. Cardiovascular: Negative. Gastrointestinal: Negative. Endocrine: Negative. Genitourinary: Negative. Musculoskeletal: Negative. Skin: Negative. Allergic/Immunologic: Negative. Neurological: Negative. Hematological: Negative. Psychiatric/Behavioral: Negative. Current Medications       Current Outpatient Medications:     predniSONE 10 mg tablet, Take 5 tabs po x 2 days; 4 tabs po x 2 days; 3 tabs po x 1 day; 2 tabs po x 1 day.  1 tab po x 1 day., Disp: 24 tablet, Rfl: 0 fluticasone (FLONASE) 50 mcg/act nasal spray, 1 spray into each nostril daily, Disp: 11.1 mL, Rfl: 0    gabapentin (NEURONTIN) 300 mg capsule, , Disp: , Rfl:     hydrocortisone 2.5 % cream, Apply topically 2 (two) times a day as needed for rash, Disp: 28 g, Rfl: 0    Multiple Vitamins-Minerals (PX MENS MULTIVITAMINS) TABS, Take by mouth, Disp: , Rfl:     oxyCODONE (ROXICODONE) 5 immediate release tablet, Take 1 tablet by mouth every 4 hours as needed for postoperative pain. Max 6 tablets per day. (Patient not taking: Reported on 9/26/2023), Disp: , Rfl:     pravastatin (PRAVACHOL) 40 mg tablet, , Disp: , Rfl:     Current Allergies     Allergies as of 12/04/2023 - Reviewed 12/04/2023   Allergen Reaction Noted    Tetracycline  02/20/2017            The following portions of the patient's history were reviewed and updated as appropriate: allergies, current medications, past family history, past medical history, past social history, past surgical history and problem list.     Past Medical History:   Diagnosis Date    High cholesterol     Restless leg syndrome        Past Surgical History:   Procedure Laterality Date    CARPAL TUNNEL RELEASE Bilateral     ORIF FINGER / THUMB FRACTURE         History reviewed. No pertinent family history. Medications have been verified. Objective   /72   Pulse 86   Temp 97.8 °F (36.6 °C)   Resp 20   Ht 5' 8" (1.727 m)   Wt 94 kg (207 lb 3.2 oz)   SpO2 97%   BMI 31.50 kg/m²   No LMP for male patient. Physical Exam     Physical Exam  Vitals and nursing note reviewed. Constitutional:       General: He is not in acute distress. Appearance: Normal appearance. He is normal weight. He is not ill-appearing, toxic-appearing or diaphoretic. HENT:      Head: Normocephalic and atraumatic. Right Ear: Tympanic membrane and ear canal normal.      Left Ear: Tympanic membrane and ear canal normal.      Nose: Congestion present. No rhinorrhea.       Comments: Pale and boggy nasal turbinates B/L      Mouth/Throat:      Mouth: Mucous membranes are moist.      Pharynx: No oropharyngeal exudate or posterior oropharyngeal erythema. Eyes:      Extraocular Movements: Extraocular movements intact. Cardiovascular:      Rate and Rhythm: Normal rate and regular rhythm. Pulses: Normal pulses. Heart sounds: Normal heart sounds. Pulmonary:      Effort: Pulmonary effort is normal. No respiratory distress. Breath sounds: Normal breath sounds. No stridor. No wheezing, rhonchi or rales. Chest:      Chest wall: No tenderness. Musculoskeletal:         General: Normal range of motion. Cervical back: Normal range of motion and neck supple. Skin:     General: Skin is warm and dry. Capillary Refill: Capillary refill takes less than 2 seconds. Neurological:      General: No focal deficit present. Mental Status: He is alert and oriented to person, place, and time. Psychiatric:         Mood and Affect: Mood normal.         Behavior: Behavior normal.         Thought Content:  Thought content normal.         Judgment: Judgment normal.

## 2023-12-04 NOTE — PATIENT INSTRUCTIONS
You appear to have a viral upper respiratory infection with cough and laryngitis. You are to take the prednisone as prescribed  You are to do warm salt water gargles 4 x daily. Drink warm tea with honey and lemon. Take tylenol or motrin as able for pain or fever. Chloraseptic throat spray, cough drops.   Follow up with your PCP in 3-5 days   Go to the ED if symptoms worsen

## 2024-05-21 ENCOUNTER — HOSPITAL ENCOUNTER (INPATIENT)
Facility: HOSPITAL | Age: 64
LOS: 2 days | Discharge: HOME | DRG: 419 | End: 2024-05-24
Attending: EMERGENCY MEDICINE | Admitting: SURGERY
Payer: COMMERCIAL

## 2024-05-21 ENCOUNTER — OFFICE VISIT (OUTPATIENT)
Dept: URGENT CARE | Facility: CLINIC | Age: 64
End: 2024-05-21
Payer: COMMERCIAL

## 2024-05-21 ENCOUNTER — APPOINTMENT (EMERGENCY)
Dept: CT IMAGING | Facility: HOSPITAL | Age: 64
DRG: 419 | End: 2024-05-21
Payer: COMMERCIAL

## 2024-05-21 VITALS
HEART RATE: 84 BPM | OXYGEN SATURATION: 98 % | TEMPERATURE: 98.1 F | RESPIRATION RATE: 20 BRPM | DIASTOLIC BLOOD PRESSURE: 83 MMHG | SYSTOLIC BLOOD PRESSURE: 149 MMHG

## 2024-05-21 DIAGNOSIS — R11.2 NAUSEA AND VOMITING, UNSPECIFIED VOMITING TYPE: ICD-10-CM

## 2024-05-21 DIAGNOSIS — R14.0 ABDOMINAL DISTENTION: ICD-10-CM

## 2024-05-21 DIAGNOSIS — K85.90 PANCREATITIS: Primary | ICD-10-CM

## 2024-05-21 DIAGNOSIS — R10.10 PAIN OF UPPER ABDOMEN: Primary | ICD-10-CM

## 2024-05-21 DIAGNOSIS — K80.20 CHOLELITHIASIS: ICD-10-CM

## 2024-05-21 DIAGNOSIS — K85.10 GALLSTONE PANCREATITIS: ICD-10-CM

## 2024-05-21 LAB
ALBUMIN SERPL BCP-MCNC: 4.5 G/DL (ref 3.5–5)
ALP SERPL-CCNC: 75 U/L (ref 34–104)
ALT SERPL W P-5'-P-CCNC: 58 U/L (ref 7–52)
ANION GAP SERPL CALCULATED.3IONS-SCNC: 12 MMOL/L (ref 4–13)
AST SERPL W P-5'-P-CCNC: 31 U/L (ref 13–39)
BASOPHILS # BLD AUTO: 0.04 THOUSANDS/ÂΜL (ref 0–0.1)
BASOPHILS NFR BLD AUTO: 0 % (ref 0–1)
BILIRUB SERPL-MCNC: 0.78 MG/DL (ref 0.2–1)
BUN SERPL-MCNC: 13 MG/DL (ref 5–25)
CALCIUM SERPL-MCNC: 9.3 MG/DL (ref 8.4–10.2)
CHLORIDE SERPL-SCNC: 103 MMOL/L (ref 96–108)
CO2 SERPL-SCNC: 19 MMOL/L (ref 21–32)
CREAT SERPL-MCNC: 1.01 MG/DL (ref 0.6–1.3)
EOSINOPHIL # BLD AUTO: 0.16 THOUSAND/ÂΜL (ref 0–0.61)
EOSINOPHIL NFR BLD AUTO: 1 % (ref 0–6)
ERYTHROCYTE [DISTWIDTH] IN BLOOD BY AUTOMATED COUNT: 14.4 % (ref 11.6–15.1)
GFR SERPL CREATININE-BSD FRML MDRD: 78 ML/MIN/1.73SQ M
GLUCOSE SERPL-MCNC: 209 MG/DL (ref 65–140)
HCT VFR BLD AUTO: 42.6 % (ref 36.5–49.3)
HGB BLD-MCNC: 14.2 G/DL (ref 12–17)
IMM GRANULOCYTES # BLD AUTO: 0.08 THOUSAND/UL (ref 0–0.2)
IMM GRANULOCYTES NFR BLD AUTO: 1 % (ref 0–2)
LIPASE SERPL-CCNC: 682 U/L (ref 11–82)
LYMPHOCYTES # BLD AUTO: 2.78 THOUSANDS/ÂΜL (ref 0.6–4.47)
LYMPHOCYTES NFR BLD AUTO: 17 % (ref 14–44)
MCH RBC QN AUTO: 30.3 PG (ref 26.8–34.3)
MCHC RBC AUTO-ENTMCNC: 33.3 G/DL (ref 31.4–37.4)
MCV RBC AUTO: 91 FL (ref 82–98)
MONOCYTES # BLD AUTO: 1.39 THOUSAND/ÂΜL (ref 0.17–1.22)
MONOCYTES NFR BLD AUTO: 8 % (ref 4–12)
NEUTROPHILS # BLD AUTO: 12.18 THOUSANDS/ÂΜL (ref 1.85–7.62)
NEUTS SEG NFR BLD AUTO: 73 % (ref 43–75)
NRBC BLD AUTO-RTO: 0 /100 WBCS
PLATELET # BLD AUTO: 265 THOUSANDS/UL (ref 149–390)
PMV BLD AUTO: 10.2 FL (ref 8.9–12.7)
POTASSIUM SERPL-SCNC: 3.6 MMOL/L (ref 3.5–5.3)
PROT SERPL-MCNC: 7.6 G/DL (ref 6.4–8.4)
RBC # BLD AUTO: 4.68 MILLION/UL (ref 3.88–5.62)
SODIUM SERPL-SCNC: 134 MMOL/L (ref 135–147)
WBC # BLD AUTO: 16.63 THOUSAND/UL (ref 4.31–10.16)

## 2024-05-21 PROCEDURE — 85025 COMPLETE CBC W/AUTO DIFF WBC: CPT | Performed by: EMERGENCY MEDICINE

## 2024-05-21 PROCEDURE — 96360 HYDRATION IV INFUSION INIT: CPT

## 2024-05-21 PROCEDURE — 74177 CT ABD & PELVIS W/CONTRAST: CPT

## 2024-05-21 PROCEDURE — 99215 OFFICE O/P EST HI 40 MIN: CPT | Performed by: NURSE PRACTITIONER

## 2024-05-21 PROCEDURE — 99284 EMERGENCY DEPT VISIT MOD MDM: CPT

## 2024-05-21 PROCEDURE — 99222 1ST HOSP IP/OBS MODERATE 55: CPT | Performed by: SURGERY

## 2024-05-21 PROCEDURE — C9113 INJ PANTOPRAZOLE SODIUM, VIA: HCPCS

## 2024-05-21 PROCEDURE — 83690 ASSAY OF LIPASE: CPT | Performed by: EMERGENCY MEDICINE

## 2024-05-21 PROCEDURE — 99285 EMERGENCY DEPT VISIT HI MDM: CPT | Performed by: EMERGENCY MEDICINE

## 2024-05-21 PROCEDURE — 36415 COLL VENOUS BLD VENIPUNCTURE: CPT | Performed by: EMERGENCY MEDICINE

## 2024-05-21 PROCEDURE — 80053 COMPREHEN METABOLIC PANEL: CPT | Performed by: EMERGENCY MEDICINE

## 2024-05-21 RX ORDER — PANTOPRAZOLE SODIUM 20 MG/1
20 TABLET, DELAYED RELEASE ORAL DAILY
COMMUNITY
End: 2024-05-26

## 2024-05-21 RX ORDER — KETOROLAC TROMETHAMINE 30 MG/ML
15 INJECTION, SOLUTION INTRAMUSCULAR; INTRAVENOUS EVERY 6 HOURS SCHEDULED
Status: DISCONTINUED | OUTPATIENT
Start: 2024-05-21 | End: 2024-05-24 | Stop reason: HOSPADM

## 2024-05-21 RX ORDER — HYDROMORPHONE HCL IN WATER/PF 6 MG/30 ML
0.2 PATIENT CONTROLLED ANALGESIA SYRINGE INTRAVENOUS
Status: DISCONTINUED | OUTPATIENT
Start: 2024-05-21 | End: 2024-05-24 | Stop reason: HOSPADM

## 2024-05-21 RX ORDER — HEPARIN SODIUM 5000 [USP'U]/ML
5000 INJECTION, SOLUTION INTRAVENOUS; SUBCUTANEOUS EVERY 8 HOURS SCHEDULED
Status: DISCONTINUED | OUTPATIENT
Start: 2024-05-21 | End: 2024-05-23

## 2024-05-21 RX ORDER — PRAVASTATIN SODIUM 10 MG
10 TABLET ORAL DAILY
COMMUNITY
End: 2024-05-26

## 2024-05-21 RX ORDER — HYDROMORPHONE HCL IN WATER/PF 6 MG/30 ML
0.2 PATIENT CONTROLLED ANALGESIA SYRINGE INTRAVENOUS ONCE
Status: COMPLETED | OUTPATIENT
Start: 2024-05-21 | End: 2024-05-21

## 2024-05-21 RX ORDER — GABAPENTIN 300 MG/1
300 CAPSULE ORAL
Status: DISCONTINUED | OUTPATIENT
Start: 2024-05-21 | End: 2024-05-24 | Stop reason: HOSPADM

## 2024-05-21 RX ORDER — HYDROMORPHONE HCL/PF 1 MG/ML
0.5 SYRINGE (ML) INJECTION
Status: DISCONTINUED | OUTPATIENT
Start: 2024-05-21 | End: 2024-05-24 | Stop reason: HOSPADM

## 2024-05-21 RX ORDER — PANTOPRAZOLE SODIUM 40 MG/1
TABLET, DELAYED RELEASE ORAL
COMMUNITY
Start: 2024-04-26 | End: 2024-05-21

## 2024-05-21 RX ORDER — SODIUM CHLORIDE, SODIUM LACTATE, POTASSIUM CHLORIDE, CALCIUM CHLORIDE 600; 310; 30; 20 MG/100ML; MG/100ML; MG/100ML; MG/100ML
125 INJECTION, SOLUTION INTRAVENOUS CONTINUOUS
Status: DISCONTINUED | OUTPATIENT
Start: 2024-05-21 | End: 2024-05-23

## 2024-05-21 RX ORDER — ACETAMINOPHEN 325 MG/1
650 TABLET ORAL EVERY 6 HOURS PRN
Status: DISCONTINUED | OUTPATIENT
Start: 2024-05-21 | End: 2024-05-24 | Stop reason: HOSPADM

## 2024-05-21 RX ORDER — PANTOPRAZOLE SODIUM 40 MG/10ML
40 INJECTION, POWDER, LYOPHILIZED, FOR SOLUTION INTRAVENOUS
Status: DISCONTINUED | OUTPATIENT
Start: 2024-05-21 | End: 2024-05-24 | Stop reason: HOSPADM

## 2024-05-21 RX ORDER — ONDANSETRON 2 MG/ML
4 INJECTION INTRAMUSCULAR; INTRAVENOUS EVERY 6 HOURS PRN
Status: DISCONTINUED | OUTPATIENT
Start: 2024-05-21 | End: 2024-05-24 | Stop reason: HOSPADM

## 2024-05-21 RX ADMIN — IOHEXOL 100 ML: 350 INJECTION, SOLUTION INTRAVENOUS at 11:52

## 2024-05-21 RX ADMIN — HEPARIN SODIUM 5000 UNITS: 5000 INJECTION, SOLUTION INTRAVENOUS; SUBCUTANEOUS at 15:19

## 2024-05-21 RX ADMIN — KETOROLAC TROMETHAMINE 15 MG: 30 INJECTION, SOLUTION INTRAMUSCULAR; INTRAVENOUS at 23:42

## 2024-05-21 RX ADMIN — PANTOPRAZOLE SODIUM 40 MG: 40 INJECTION, POWDER, FOR SOLUTION INTRAVENOUS at 15:18

## 2024-05-21 RX ADMIN — SODIUM CHLORIDE 1000 ML: 0.9 INJECTION, SOLUTION INTRAVENOUS at 11:03

## 2024-05-21 RX ADMIN — HEPARIN SODIUM 5000 UNITS: 5000 INJECTION, SOLUTION INTRAVENOUS; SUBCUTANEOUS at 22:55

## 2024-05-21 RX ADMIN — KETOROLAC TROMETHAMINE 15 MG: 30 INJECTION, SOLUTION INTRAMUSCULAR; INTRAVENOUS at 15:19

## 2024-05-21 RX ADMIN — HYDROMORPHONE HYDROCHLORIDE 0.2 MG: 0.2 INJECTION, SOLUTION INTRAMUSCULAR; INTRAVENOUS; SUBCUTANEOUS at 14:27

## 2024-05-21 RX ADMIN — KETOROLAC TROMETHAMINE 15 MG: 30 INJECTION, SOLUTION INTRAMUSCULAR; INTRAVENOUS at 21:38

## 2024-05-21 RX ADMIN — SODIUM CHLORIDE, SODIUM LACTATE, POTASSIUM CHLORIDE, AND CALCIUM CHLORIDE 125 ML/HR: .6; .31; .03; .02 INJECTION, SOLUTION INTRAVENOUS at 15:19

## 2024-05-21 RX ADMIN — GABAPENTIN 300 MG: 300 CAPSULE ORAL at 21:38

## 2024-05-21 NOTE — ED NOTES
Triage delayed as patient was incontinent of stool in the waiting room and needed to clean up.     Hayes Contreras RN  05/21/24 [id number]

## 2024-05-21 NOTE — PROGRESS NOTES
"  [facility]        NAME: Don Valdez is a 63 y.o. male  : 1960    MRN: [id number]  DATE: May 21, 2024  TIME: 10:15 AM    Assessment and Plan   Pain of upper abdomen [R10.10]  1. Pain of upper abdomen  Transfer to other facility      2. Abdominal distention  Transfer to other facility      3. Nausea and vomiting, unspecified vomiting type  Transfer to other facility            Patient Instructions       Follow up with PCP in 3-5 days.  Proceed to  ER if symptoms worsen.    If tests have been performed at [facility], our office will contact you with results if changes need to be made to the care plan discussed with you at the visit.  You can review your full results on [facility].    Chief Complaint     Chief Complaint   Patient presents with    Abdominal Pain     C/o epigastric abdominal pain onset \"\" with nausea and vomiting. Pt states \"I ate a sausage, egg, cheese on a bagel and haven't felt right since then\". Denies diarrhea. Pt reports utilizing OTC medications, not improving. Denies other symptoms, denies fever.          History of Present Illness       This is a 63 year old male who states that on  he had eaten a sausage, egg, cheese sandwich on a bagel and then vomited. He states he hasn't felt right since he ate this.  He states he has had abdominal pain with nausea and vomiting.   States last BM yesterday and was slightly firm.  He states he has taken generic tums w/o relief.  He states just not feeling well.  Denies hx of cholecystitis, pancreatitis, alcohol use, abdominal surgery.  PMH is listed.      Abdominal Pain  Associated symptoms include nausea and vomiting. Pertinent negatives include no diarrhea.       Review of Systems   Review of Systems   Constitutional: Negative.    HENT: Negative.     Eyes: Negative.    Respiratory: Negative.     Cardiovascular: Negative.    Gastrointestinal:  Positive for abdominal pain, nausea and vomiting. Negative for diarrhea. "   Endocrine: Negative.    Genitourinary: Negative.    Musculoskeletal: Negative.    Skin: Negative.    Allergic/Immunologic: Negative.    Neurological: Negative.    Hematological: Negative.    Psychiatric/Behavioral: Negative.           Current Medications       Current Outpatient Medications:     gabapentin (NEURONTIN) 300 mg capsule, , Disp: , Rfl:     pantoprazole (PROTONIX) 40 mg tablet, , Disp: , Rfl:     pravastatin (PRAVACHOL) 40 mg tablet, , Disp: , Rfl:     fluticasone (FLONASE) 50 mcg/act nasal spray, 1 spray into each nostril daily, Disp: 11.1 mL, Rfl: 0    hydrocortisone 2.5 % cream, Apply topically 2 (two) times a day as needed for rash, Disp: 28 g, Rfl: 0    Multiple Vitamins-Minerals (PX MENS MULTIVITAMINS) TABS, Take by mouth, Disp: , Rfl:     oxyCODONE (ROXICODONE) 5 immediate release tablet, Take 1 tablet by mouth every 4 hours as needed for postoperative pain. Max 6 tablets per day. (Patient not taking: Reported on 9/26/2023), Disp: , Rfl:     predniSONE 10 mg tablet, Take 5 tabs po x 2 days; 4 tabs po x 2 days; 3 tabs po x 1 day; 2 tabs po x 1 day. 1 tab po x 1 day., Disp: 24 tablet, Rfl: 0    Current Allergies     Allergies as of 05/21/2024 - Reviewed 05/21/2024   Allergen Reaction Noted    Tetracycline  02/20/2017            The following portions of the patient's history were reviewed and updated as appropriate: allergies, current medications, past family history, past medical history, past social history, past surgical history and problem list.     Past Medical History:   Diagnosis Date    High cholesterol     Restless leg syndrome        Past Surgical History:   Procedure Laterality Date    CARPAL TUNNEL RELEASE Bilateral     ORIF FINGER / THUMB FRACTURE         History reviewed. No pertinent family history.      Medications have been verified.        Objective   /83 (BP Location: Right arm, Patient Position: Sitting)   Pulse 84   Temp 98.1 °F (36.7 °C) (Temporal)   Resp 20   SpO2  98%   No LMP for male patient.       Physical Exam     Physical Exam  Vitals and nursing note reviewed.   Constitutional:       General: He is not in acute distress.     Appearance: He is well-developed. He is obese. He is not ill-appearing, toxic-appearing or diaphoretic.   HENT:      Head: Normocephalic and atraumatic.      Mouth/Throat:      Mouth: Mucous membranes are moist.   Eyes:      Extraocular Movements: Extraocular movements intact.   Cardiovascular:      Rate and Rhythm: Normal rate and regular rhythm.      Heart sounds: Normal heart sounds. No murmur heard.  Pulmonary:      Effort: Pulmonary effort is normal.      Breath sounds: Normal breath sounds.   Abdominal:      General: Bowel sounds are normal. There is distension.      Tenderness: There is abdominal tenderness in the epigastric area.      Comments: Tenderness upper abd and epigastric area    Skin:     General: Skin is warm and dry.      Capillary Refill: Capillary refill takes less than 2 seconds.   Neurological:      General: No focal deficit present.      Mental Status: He is alert and oriented to person, place, and time.   Psychiatric:         Mood and Affect: Mood normal.         Behavior: Behavior normal.         Discussed with pt that his symptoms require a higher level of care, eval and tx. Explained that most likely labs and CT scan would be warranted. He states he has PCP appointment tomorrow - explained that he would most likely be sent to ED by PCP and should not wait and go today.  He verbalizes agreement.  ADT 21 for [facility] completed.  Pt instructed not to eat, drink or void.

## 2024-05-21 NOTE — PATIENT INSTRUCTIONS
You are being sent to the [facility] ED for a higher level of care and evaluation.  You are to see your PCP after the ED visit  Do not eat, drink or void until you are seen in the ED

## 2024-05-21 NOTE — ED PROVIDER NOTES
History  Chief Complaint   Patient presents with    Abdominal Pain     Patient reports abdominal pain that started on Sunday evening after eating a sandwich. Reports that since then he has had diarrhea and abdominal discomfort.      62 yo male presenting to the ed states that he was givena sadnwich by someone 2 days ago and at night that night he had vomiting and abdominal pain, second episode that night with no further vomiting. Since then consistent abdominal pain with episode of diarrhea here.  States vomiting and diarrhea have been nonbloody.  Denies chest pain, shortness of breath, urinary symptoms.  States after having the episode of diarrhea here actually is starting to feel little better.        Prior to Admission Medications   Prescriptions Last Dose Informant Patient Reported? Taking?   gabapentin (NEURONTIN) 300 mg capsule   Yes No      Facility-Administered Medications: None       Past Medical History:   Diagnosis Date    High cholesterol     Restless leg syndrome        Past Surgical History:   Procedure Laterality Date    CARPAL TUNNEL RELEASE Bilateral     ORIF FINGER / THUMB FRACTURE         History reviewed. No pertinent family history.  I have reviewed and agree with the history as documented.    E-Cigarette/Vaping    E-Cigarette Use Never User      E-Cigarette/Vaping Substances     Social History     Tobacco Use    Smoking status: Never    Smokeless tobacco: Never   Vaping Use    Vaping status: Never Used   Substance Use Topics    Alcohol use: Not Currently    Drug use: Not Currently       Review of Systems   Gastrointestinal:  Positive for abdominal pain, diarrhea, nausea and vomiting.   All other systems reviewed and are negative.      Physical Exam  Physical Exam  Vitals and nursing note reviewed.   Constitutional:       General: He is not in acute distress.     Appearance: He is well-developed. He is not diaphoretic.   HENT:      Head: Normocephalic and atraumatic.      Right Ear: External ear  normal.      Left Ear: External ear normal.      Nose: Nose normal.   Eyes:      General: No scleral icterus.        Right eye: No discharge.         Left eye: No discharge.      Conjunctiva/sclera: Conjunctivae normal.   Cardiovascular:      Rate and Rhythm: Normal rate and regular rhythm.      Heart sounds: Normal heart sounds. No murmur heard.     No friction rub. No gallop.   Pulmonary:      Effort: Pulmonary effort is normal. No respiratory distress.      Breath sounds: Normal breath sounds. No wheezing or rales.   Abdominal:      General: Bowel sounds are normal. There is no distension.      Palpations: Abdomen is soft. There is no mass.      Tenderness: There is abdominal tenderness in the epigastric area and periumbilical area. There is no right CVA tenderness, left CVA tenderness or guarding.   Musculoskeletal:         General: No tenderness or deformity. Normal range of motion.      Cervical back: Normal range of motion and neck supple.   Skin:     General: Skin is warm and dry.      Coloration: Skin is not pale.      Findings: No erythema or rash.   Neurological:      Mental Status: He is alert and oriented to person, place, and time.   Psychiatric:         Behavior: Behavior normal.         Thought Content: Thought content normal.         Judgment: Judgment normal.         Vital Signs  ED Triage Vitals   Temperature Pulse Respirations Blood Pressure SpO2   05/21/24 1045 05/21/24 1045 05/21/24 1045 05/21/24 1045 05/21/24 1045   98.2 °F (36.8 °C) 104 18 (!) 174/91 96 %      Temp src Heart Rate Source Patient Position - Orthostatic VS BP Location FiO2 (%)   -- 05/21/24 1115 05/21/24 1045 05/21/24 1045 --    Monitor Sitting Left arm       Pain Score       05/21/24 1427       5           Vitals:    05/21/24 1230 05/21/24 1330 05/21/24 1400 05/21/24 1430   BP: 156/77 141/67 144/67 158/77   Pulse: 86 81 77 89   Patient Position - Orthostatic VS:             Visual Acuity      ED Medications  Medications    ondansetron (ZOFRAN) injection 4 mg (has no administration in time range)   heparin (porcine) subcutaneous injection 5,000 Units (has no administration in time range)   ketorolac (TORADOL) injection 15 mg (has no administration in time range)   acetaminophen (TYLENOL) tablet 650 mg (has no administration in time range)   HYDROmorphone HCl (DILAUDID) injection 0.2 mg (has no administration in time range)   HYDROmorphone (DILAUDID) injection 0.5 mg (has no administration in time range)   pantoprazole (PROTONIX) injection 40 mg (has no administration in time range)   lactated ringers infusion (has no administration in time range)   gabapentin (NEURONTIN) capsule 300 mg (has no administration in time range)   sodium chloride 0.9 % bolus 1,000 mL (0 mL Intravenous Stopped 5/21/24 1147)   iohexol (OMNIPAQUE) 350 MG/ML injection (MULTI-DOSE) 100 mL (100 mL Intravenous Given 5/21/24 1152)   HYDROmorphone HCl (DILAUDID) injection 0.2 mg (0.2 mg Intravenous Given 5/21/24 1427)       Diagnostic Studies  Results Reviewed       Procedure Component Value Units Date/Time    Comprehensive metabolic panel [[id number]]  (Abnormal) Collected: 05/21/24 1103    Lab Status: Final result Specimen: Blood from Arm, Left Updated: 05/21/24 1140     Sodium 134 mmol/L      Potassium 3.6 mmol/L      Chloride 103 mmol/L      CO2 19 mmol/L      ANION GAP 12 mmol/L      BUN 13 mg/dL      Creatinine 1.01 mg/dL      Glucose 209 mg/dL      Calcium 9.3 mg/dL      AST 31 U/L      ALT 58 U/L      Alkaline Phosphatase 75 U/L      Total Protein 7.6 g/dL      Albumin 4.5 g/dL      Total Bilirubin 0.78 mg/dL      eGFR 78 ml/min/1.73sq m     Narrative:      National Kidney Disease Foundation guidelines for Chronic Kidney Disease (CKD):     Stage 1 with normal or high GFR (GFR > 90 mL/min/1.73 square meters)    Stage 2 Mild CKD (GFR = 60-89 mL/min/1.73 square meters)    Stage 3A Moderate CKD (GFR = 45-59 mL/min/1.73 square meters)    Stage 3B Moderate CKD  (GFR = 30-44 mL/min/1.73 square meters)    Stage 4 Severe CKD (GFR = 15-29 mL/min/1.73 square meters)    Stage 5 End Stage CKD (GFR <15 mL/min/1.73 square meters)  Note: GFR calculation is accurate only with a steady state creatinine    Lipase [[id number]]  (Abnormal) Collected: 05/21/24 1103    Lab Status: Final result Specimen: Blood from Arm, Left Updated: 05/21/24 1140     Lipase 682 u/L     CBC and differential [[id number]]  (Abnormal) Collected: 05/21/24 1103    Lab Status: Final result Specimen: Blood from Arm, Left Updated: 05/21/24 1108     WBC 16.63 Thousand/uL      RBC 4.68 Million/uL      Hemoglobin 14.2 g/dL      Hematocrit 42.6 %      MCV 91 fL      MCH 30.3 pg      MCHC 33.3 g/dL      RDW 14.4 %      MPV 10.2 fL      Platelets 265 Thousands/uL      nRBC 0 /100 WBCs      Segmented % 73 %      Immature Grans % 1 %      Lymphocytes % 17 %      Monocytes % 8 %      Eosinophils Relative 1 %      Basophils Relative 0 %      Absolute Neutrophils 12.18 Thousands/µL      Absolute Immature Grans 0.08 Thousand/uL      Absolute Lymphocytes 2.78 Thousands/µL      Absolute Monocytes 1.39 Thousand/µL      Eosinophils Absolute 0.16 Thousand/µL      Basophils Absolute 0.04 Thousands/µL                    CT abdomen pelvis with contrast   Final Result by Stephanie Castañeda MD (05/21 1337)      Mild peripancreatic mesenteric fat haziness, nonspecific. Correlate to exclude symptoms of pancreatitis. Otherwise unremarkable pancreas.   Hepatomegaly.   Sigmoid diverticulosis. No evidence of diverticulitis.   Minor findings, as per the body of the report.         Workstation performed: [id number]                    Procedures  Procedures         ED Course  ED Course as of 05/21/24 1500   Tue May 21, 2024   1055 Patgient declining pain or nausea medications at this tme.    1420 Case discussed with general surgery and patient now requesting pain medications.  General surgery will admit patient however stating to hold on antibiotics  at this time as he does not have clear signs of acute cholecystitis.                               SBIRT 20yo+      Flowsheet Row Most Recent Value   Initial Alcohol Screen: US AUDIT-C     1. How often do you have a drink containing alcohol? 0 Filed at: 05/21/2024 1052   2. How many drinks containing alcohol do you have on a typical day you are drinking?  0 Filed at: 05/21/2024 1052   3a. Male UNDER 65: How often do you have five or more drinks on one occasion? 0 Filed at: 05/21/2024 1052   3b. FEMALE Any Age, or MALE 65+: How often do you have 4 or more drinks on one occassion? 0 Filed at: 05/21/2024 1052   Audit-C Score 0 Filed at: 05/21/2024 1052   GIGI: How many times in the past year have you...    Used an illegal drug or used a prescription medication for non-medical reasons? Never Filed at: 05/21/2024 1052                      Medical Decision Making  63-year-old male presenting to the ED for abdominal discomfort with 2 episodes of vomiting 2 days ago which were nonbloody and an episode of diarrhea here.  States he still having abdominal discomfort but it is mildly improved after the episode of diarrhea here.  Denies fevers, night sweats, chills, any other symptoms.    Amount and/or Complexity of Data Reviewed  Labs: ordered.  Radiology: ordered.    Risk  Prescription drug management.  Decision regarding hospitalization.             Disposition  Final diagnoses:   Pancreatitis   Cholelithiasis     Time reflects when diagnosis was documented in both MDM as applicable and the Disposition within this note       Time User Action Codes Description Comment    5/21/2024  2:22 PM Rich Ball [K85.90] Pancreatitis     5/21/2024  2:22 PM Rich Ball [K80.20] Cholelithiasis           ED Disposition       ED Disposition   Admit    Condition   Stable    Date/Time   Tue May 21, 2024 1421    Comment   Case was discussed with DANIEL and the patient's admission status was agreed to be Admission Status:  observation status to the service of Dr. Stoner.               Follow-up Information    None         Patient's Medications   Discharge Prescriptions    No medications on file       No discharge procedures on file.    PDMP Review       None            ED Provider  Electronically Signed by             Rich Ball DO  05/21/24 1500

## 2024-05-21 NOTE — H&P
"H&P - General Surgery   Don Valdez 63 y.o. male MRN: [id number]  Unit/Bed#: ED 14 Encounter: [id number]  Assessment:  63 year old male with PMH significant for restless leg syndrome presents for the evaluation of gallstone pancreatitis   Afebrile, vitals stable   WBC 16.63  Hgb 14.2  Na 134  Cr 1.01  Lipase 682  LFTs unremarkable   Tbili 0.78  CTAP: \"Mild peripancreatic mesenteric fat haziness, nonspecific. Correlate to exclude symptoms of pancreatitis. Otherwise unremarkable pancreas. Hepatomegaly. Sigmoid diverticulosis. No evidence of diverticulitis.\"  Clinically well appearing, nontoxic. Abdomen soft, distended, TTP in epigastric region    Plan:  Admit to the general surgery service for conservative management of acute pancreatitis   Will plan for laparoscopic cholecystectomy once pancreatitis episode resolves, timing pending clinical course. Treatment plan and procedure reviewed with patient   Clear liquids   IV fluids   Analgesia and antiemetics prn   DVT prophylaxis   Will discuss with Dr. Stoner     History of Present Illness   Chief Complaint: abdominal pain      HPI:  Don Valdez is a 63 y.o. male with past medical history significant for restless leg syndrome who initially presented to [facility] ED with complaints of abdominal pain. Patient reports abdominal pain started Sunday. He reports an episode of nausea and vomiting Sunday as well. No further episodes. He reports he did not eat much on Monday and the pain worsened today, prompting his presentation to the ER. He reports that he had two cupcakes earlier this morning and tolerated with no nausea or vomiting. He reports he is having regular bowel movements. Denies history of biliary colic. Denies blood thinners. Denies recent procedures or new medications. Denies prior abdominal surgeries, but reports he has had surgery for a \"rectal fistula\" in 1990. Unable to provide more information on that. He states he has had a colonoscopy within the past " two years, but unable to find information on that in the chart.     Review of Systems   Constitutional:  Negative for chills and fever.   HENT:  Negative for congestion and sore throat.    Respiratory:  Negative for cough and shortness of breath.    Cardiovascular:  Negative for chest pain and leg swelling.   Gastrointestinal:  Positive for abdominal pain, nausea and vomiting.   Genitourinary:  Negative for difficulty urinating and urgency.   Musculoskeletal:  Negative for back pain and gait problem.   Skin:  Negative for pallor and rash.   Neurological:  Negative for dizziness, light-headedness, numbness and headaches.       Historical Information   Past Medical History:   Diagnosis Date    High cholesterol     Restless leg syndrome      Past Surgical History:   Procedure Laterality Date    CARPAL TUNNEL RELEASE Bilateral     ORIF FINGER / THUMB FRACTURE       Social History   Social History     Substance and Sexual Activity   Alcohol Use Not Currently     Social History     Substance and Sexual Activity   Drug Use Not Currently     Social History     Tobacco Use   Smoking Status Never   Smokeless Tobacco Never     E-Cigarette/Vaping    E-Cigarette Use Never User      E-Cigarette/Vaping Substances     Family History: non-contributory    Meds/Allergies   PTA meds:   Prior to Admission Medications   Prescriptions Last Dose Informant Patient Reported? Taking?   gabapentin (NEURONTIN) 300 mg capsule   Yes No      Facility-Administered Medications: None     Allergies   Allergen Reactions    Tetracycline        Objective   First Vitals:   Blood Pressure: (!) 174/91 (05/21/24 1045)  Pulse: 104 (05/21/24 1045)  Temperature: 98.2 °F (36.8 °C) (05/21/24 1045)  Respirations: 18 (05/21/24 1045)  SpO2: 96 % (05/21/24 1045)    Current Vitals:   Blood Pressure: 144/67 (05/21/24 1400)  Pulse: 77 (05/21/24 1400)  Temperature: 98.2 °F (36.8 °C) (05/21/24 1045)  Respirations: 16 (05/21/24 1400)  SpO2: 94 % (05/21/24  1400)      Intake/Output Summary (Last 24 hours) at 5/21/2024 1434  Last data filed at 5/21/2024 1147  Gross per 24 hour   Intake 1000 ml   Output --   Net 1000 ml       Invasive Devices       Peripheral Intravenous Line  Duration             Peripheral IV 05/21/24 Distal;Left;Ventral (anterior) Forearm <1 day                    Physical Exam  Vitals reviewed.   Constitutional:       General: He is not in acute distress.     Appearance: He is obese. He is not ill-appearing.   HENT:      Head: Normocephalic and atraumatic.   Cardiovascular:      Rate and Rhythm: Normal rate and regular rhythm.   Pulmonary:      Effort: Pulmonary effort is normal. No respiratory distress.   Abdominal:      General: Abdomen is flat. There is distension.      Palpations: Abdomen is soft.      Tenderness: There is abdominal tenderness. There is no guarding.   Musculoskeletal:      Right lower leg: No edema.      Left lower leg: No edema.   Skin:     General: Skin is warm and dry.   Neurological:      General: No focal deficit present.      Mental Status: He is alert. Mental status is at baseline.   Psychiatric:         Mood and Affect: Mood normal.         Behavior: Behavior normal.         Lab Results: I have personally reviewed pertinent lab results.  , CBC:   Lab Results   Component Value Date    WBC 16.63 (H) 05/21/2024    HGB 14.2 05/21/2024    HCT 42.6 05/21/2024    MCV 91 05/21/2024     05/21/2024    RBC 4.68 05/21/2024    MCH 30.3 05/21/2024    MCHC 33.3 05/21/2024    RDW 14.4 05/21/2024    MPV 10.2 05/21/2024    NRBC 0 05/21/2024   , CMP:   Lab Results   Component Value Date    SODIUM 134 (L) 05/21/2024    K 3.6 05/21/2024     05/21/2024    CO2 19 (L) 05/21/2024    BUN 13 05/21/2024    CREATININE 1.01 05/21/2024    CALCIUM 9.3 05/21/2024    AST 31 05/21/2024    ALT 58 (H) 05/21/2024    ALKPHOS 75 05/21/2024    EGFR 78 05/21/2024     Imaging: I have personally reviewed pertinent reports.    EKG, Pathology, and Other  Studies: I have personally reviewed pertinent reports.      Code Status: No Order  Advance Directive and Living Will:      Power of :    POLST:      Counseling / Coordination of Care  Total floor / unit time spent today 25 minutes.  Greater than 50% of total time was spent with the patient and / or family counseling and / or coordination of care.  A description of the counseling / coordination of care: evaluation of patient, review of diagnostic and laboratory studies and discussion with attending.       Izabela Waggoner PA-C

## 2024-05-22 ENCOUNTER — APPOINTMENT (OUTPATIENT)
Dept: RADIOLOGY | Facility: HOSPITAL | Age: 64
DRG: 419 | End: 2024-05-22
Payer: COMMERCIAL

## 2024-05-22 LAB
ALBUMIN SERPL BCP-MCNC: 3.8 G/DL (ref 3.5–5)
ALP SERPL-CCNC: 62 U/L (ref 34–104)
ALT SERPL W P-5'-P-CCNC: 38 U/L (ref 7–52)
ANION GAP SERPL CALCULATED.3IONS-SCNC: 6 MMOL/L (ref 4–13)
AST SERPL W P-5'-P-CCNC: 20 U/L (ref 13–39)
ATRIAL RATE: 77 BPM
BILIRUB SERPL-MCNC: 0.82 MG/DL (ref 0.2–1)
BUN SERPL-MCNC: 11 MG/DL (ref 5–25)
CALCIUM SERPL-MCNC: 8.9 MG/DL (ref 8.4–10.2)
CHLORIDE SERPL-SCNC: 106 MMOL/L (ref 96–108)
CO2 SERPL-SCNC: 24 MMOL/L (ref 21–32)
CREAT SERPL-MCNC: 0.95 MG/DL (ref 0.6–1.3)
ERYTHROCYTE [DISTWIDTH] IN BLOOD BY AUTOMATED COUNT: 14.4 % (ref 11.6–15.1)
GFR SERPL CREATININE-BSD FRML MDRD: 84 ML/MIN/1.73SQ M
GLUCOSE P FAST SERPL-MCNC: 119 MG/DL (ref 65–99)
GLUCOSE SERPL-MCNC: 119 MG/DL (ref 65–140)
HCT VFR BLD AUTO: 37.7 % (ref 36.5–49.3)
HGB BLD-MCNC: 12.6 G/DL (ref 12–17)
LIPASE SERPL-CCNC: 216 U/L (ref 11–82)
MAGNESIUM SERPL-MCNC: 2 MG/DL (ref 1.9–2.7)
MCH RBC QN AUTO: 30.5 PG (ref 26.8–34.3)
MCHC RBC AUTO-ENTMCNC: 33.4 G/DL (ref 31.4–37.4)
MCV RBC AUTO: 91 FL (ref 82–98)
P AXIS: 65 DEGREES
PHOSPHATE SERPL-MCNC: 2.9 MG/DL (ref 2.3–4.1)
PLATELET # BLD AUTO: 215 THOUSANDS/UL (ref 149–390)
PMV BLD AUTO: 10.4 FL (ref 8.9–12.7)
POTASSIUM SERPL-SCNC: 3.8 MMOL/L (ref 3.5–5.3)
PR INTERVAL: 148 MS
PROT SERPL-MCNC: 6.6 G/DL (ref 6.4–8.4)
QRS AXIS: 60 DEGREES
QRSD INTERVAL: 92 MS
QT INTERVAL: 386 MS
QTC INTERVAL: 436 MS
RBC # BLD AUTO: 4.13 MILLION/UL (ref 3.88–5.62)
SODIUM SERPL-SCNC: 136 MMOL/L (ref 135–147)
T WAVE AXIS: 27 DEGREES
VENTRICULAR RATE: 77 BPM
WBC # BLD AUTO: 12.37 THOUSAND/UL (ref 4.31–10.16)

## 2024-05-22 PROCEDURE — C9113 INJ PANTOPRAZOLE SODIUM, VIA: HCPCS

## 2024-05-22 PROCEDURE — 93005 ELECTROCARDIOGRAM TRACING: CPT

## 2024-05-22 PROCEDURE — 83735 ASSAY OF MAGNESIUM: CPT

## 2024-05-22 PROCEDURE — 99232 SBSQ HOSP IP/OBS MODERATE 35: CPT | Performed by: SURGERY

## 2024-05-22 PROCEDURE — 93010 ELECTROCARDIOGRAM REPORT: CPT | Performed by: INTERNAL MEDICINE

## 2024-05-22 PROCEDURE — 83690 ASSAY OF LIPASE: CPT | Performed by: SURGERY

## 2024-05-22 PROCEDURE — 84100 ASSAY OF PHOSPHORUS: CPT

## 2024-05-22 PROCEDURE — 36415 COLL VENOUS BLD VENIPUNCTURE: CPT

## 2024-05-22 PROCEDURE — 85027 COMPLETE CBC AUTOMATED: CPT

## 2024-05-22 PROCEDURE — 80053 COMPREHEN METABOLIC PANEL: CPT

## 2024-05-22 PROCEDURE — 71045 X-RAY EXAM CHEST 1 VIEW: CPT

## 2024-05-22 RX ADMIN — SODIUM CHLORIDE, SODIUM LACTATE, POTASSIUM CHLORIDE, AND CALCIUM CHLORIDE 125 ML/HR: .6; .31; .03; .02 INJECTION, SOLUTION INTRAVENOUS at 17:50

## 2024-05-22 RX ADMIN — GABAPENTIN 300 MG: 300 CAPSULE ORAL at 21:29

## 2024-05-22 RX ADMIN — KETOROLAC TROMETHAMINE 15 MG: 30 INJECTION, SOLUTION INTRAMUSCULAR; INTRAVENOUS at 21:28

## 2024-05-22 RX ADMIN — SODIUM CHLORIDE, SODIUM LACTATE, POTASSIUM CHLORIDE, AND CALCIUM CHLORIDE 125 ML/HR: .6; .31; .03; .02 INJECTION, SOLUTION INTRAVENOUS at 05:10

## 2024-05-22 RX ADMIN — KETOROLAC TROMETHAMINE 15 MG: 30 INJECTION, SOLUTION INTRAMUSCULAR; INTRAVENOUS at 15:06

## 2024-05-22 RX ADMIN — KETOROLAC TROMETHAMINE 15 MG: 30 INJECTION, SOLUTION INTRAMUSCULAR; INTRAVENOUS at 06:38

## 2024-05-22 RX ADMIN — PANTOPRAZOLE SODIUM 40 MG: 40 INJECTION, POWDER, FOR SOLUTION INTRAVENOUS at 09:22

## 2024-05-22 RX ADMIN — HEPARIN SODIUM 5000 UNITS: 5000 INJECTION, SOLUTION INTRAVENOUS; SUBCUTANEOUS at 23:20

## 2024-05-22 RX ADMIN — HEPARIN SODIUM 5000 UNITS: 5000 INJECTION, SOLUTION INTRAVENOUS; SUBCUTANEOUS at 15:06

## 2024-05-22 RX ADMIN — HEPARIN SODIUM 5000 UNITS: 5000 INJECTION, SOLUTION INTRAVENOUS; SUBCUTANEOUS at 06:38

## 2024-05-22 NOTE — PLAN OF CARE
Problem: PAIN - ADULT  Goal: Verbalizes/displays adequate comfort level or baseline comfort level  Description: Interventions:  - Encourage patient to monitor pain and request assistance  - Assess pain using appropriate pain scale  - Administer analgesics based on type and severity of pain and evaluate response  - Implement non-pharmacological measures as appropriate and evaluate response  - Consider cultural and social influences on pain and pain management  - Notify physician/advanced practitioner if interventions unsuccessful or patient reports new pain  Outcome: Progressing     Problem: INFECTION - ADULT  Goal: Absence or prevention of progression during hospitalization  Description: INTERVENTIONS:  - Assess and monitor for signs and symptoms of infection  - Monitor lab/diagnostic results  - Monitor all insertion sites, i.e. indwelling lines, tubes, and drains  - Monitor endotracheal if appropriate and nasal secretions for changes in amount and color  - [place] appropriate cooling/warming therapies per order  - Administer medications as ordered  - Instruct and encourage patient and family to use good hand hygiene technique  - Identify and instruct in appropriate isolation precautions for identified infection/condition  Outcome: Progressing     Problem: DISCHARGE PLANNING  Goal: Discharge to home or other facility with appropriate resources  Description: INTERVENTIONS:  - Identify barriers to discharge w/patient and caregiver  - Arrange for needed discharge resources and transportation as appropriate  - Identify discharge learning needs (meds, wound care, etc.)  - Arrange for interpretive services to assist at discharge as needed  - Refer to Case Management Department for coordinating discharge planning if the patient needs post-hospital services based on physician/advanced practitioner order or complex needs related to functional status, cognitive ability, or social support system  Outcome: Progressing      Problem: Knowledge Deficit  Goal: Patient/family/caregiver demonstrates understanding of disease process, treatment plan, medications, and discharge instructions  Description: Complete learning assessment and assess knowledge base.  Interventions:  - Provide teaching at level of understanding  - Provide teaching via preferred learning methods  Outcome: Progressing     Problem: GASTROINTESTINAL - ADULT  Goal: Minimal or absence of nausea and/or vomiting  Description: INTERVENTIONS:  - Administer IV fluids if ordered to ensure adequate hydration  - Maintain NPO status until nausea and vomiting are resolved  - Nasogastric tube if ordered  - Administer ordered antiemetic medications as needed  - Provide nonpharmacologic comfort measures as appropriate  - Advance diet as tolerated, if ordered  - Consider nutrition services referral to assist patient with adequate nutrition and appropriate food choices  Outcome: Progressing  Goal: Maintains or returns to baseline bowel function  Description: INTERVENTIONS:  - Assess bowel function  - Encourage oral fluids to ensure adequate hydration  - Administer IV fluids if ordered to ensure adequate hydration  - Administer ordered medications as needed  - Encourage mobilization and activity  - Consider nutritional services referral to assist patient with adequate nutrition and appropriate food choices  Outcome: Progressing

## 2024-05-22 NOTE — ASSESSMENT & PLAN NOTE
Assessment:  Acute gallstone pancreatitis, no evidence of choledocholithiasis or biliary obstruction currently.    Plan:  Clinically stable and improving today with resolution of nausea and vomiting but persistent mild to moderate epigastric discomfort and bloating.  The nature of his condition and the treatment plan reviewed in detail.  All questions answered to his understanding and satisfaction.  Will continue clear liquids today, continued IV fluids and supportive care and allow additional 24 hours prior to proceeding with laparoscopic cholecystectomy tomorrow 5/23/2024. Reviewed with Dr. Bethea.

## 2024-05-22 NOTE — UTILIZATION REVIEW
Initial Clinical Review    Observation 5/21 1421 changed to inpatient 5/22 1041. Pt requiring continued stay for lap cass, IV fluids.     Admission: Date/Time/Statement:   Admission Orders (From admission, onward)       Ordered        05/22/24 1041  INPATIENT ADMISSION  Once            05/21/24 1421  Place in Observation  Once                          Orders Placed This Encounter   Procedures    INPATIENT ADMISSION     Standing Status:   Standing     Number of Occurrences:   1     Order Specific Question:   Level of Care     Answer:   Med Surg [16]     Order Specific Question:   Estimated length of stay     Answer:   More than 2 Midnights     Order Specific Question:   Certification     Answer:   I certify that inpatient services are medically necessary for this patient for a duration of greater than two midnights. See H&P and MD Progress Notes for additional information about the patient's course of treatment.     ED Arrival Information       Expected   5/21/2024 10:15    Arrival   5/21/2024 10:24    Acuity   Urgent              Means of arrival   Walk-In    Escorted by   Self    Service   Surgery-General    Admission type   Emergency              Arrival complaint   Abdominal pain with distention, vomiting and nausea.    Coming by private car.  Needs higher level of care and evaluation.             Chief Complaint   Patient presents with    Abdominal Pain     Patient reports abdominal pain that started on Sunday evening after eating a sandwich. Reports that since then he has had diarrhea and abdominal discomfort.        Initial Presentation: 63 y.o. male with PMH of restless leg syndrome who initially presented to [facility] ED with complaints of abdominal pain. Patient reports abdominal pain started Sunday. He reports an episode of nausea and vomiting Sunday as well. No further episodes. He reports he did not eat much on Monday and the pain worsened today, prompting his presentation to the ER. He reports that he had  two cupcakes earlier this morning and tolerated with no nausea or vomiting. He reports he is having regular bowel movements. Plan: Observation for gallstone pancreatitis: plan for laparoscopic cholecystectomy once pancreatitis episode resolve, clear liquid diet, IV fluids, pain management.     5/22 Observation changed to inpatient.    Surgery: resolution of nausea and vomiting but persistent mild to moderate epigastric discomfort and bloating.The nature of his condition and the treatment plan reviewed in detail. All questions answered to his understanding and satisfaction. Will continue clear liquids today, continued IV fluids and supportive care and allow additional 24 hours prior to proceeding with laparoscopic cholecystectomy tomorrow 5/23.     ED Triage Vitals   Temperature Pulse Respirations Blood Pressure SpO2   05/21/24 1045 05/21/24 1045 05/21/24 1045 05/21/24 1045 05/21/24 1045   98.2 °F (36.8 °C) 104 18 (!) 174/91 96 %      Temp src Heart Rate Source Patient Position - Orthostatic VS BP Location FiO2 (%)   -- 05/21/24 1115 05/21/24 1045 05/21/24 1045 --    Monitor Sitting Left arm       Pain Score       05/21/24 1427       5          Wt Readings from Last 1 Encounters:   12/04/23 94 kg (207 lb 3.2 oz)     Additional Vital Signs:     Date/Time Temp Pulse Resp BP MAP (mmHg) SpO2 O2 Device   05/22/24 0600 -- 76 -- 126/75 96 91 % --   05/22/24 0501 -- 73 18 139/82 -- 94 % None (Room air)   05/21/24 2236 -- 82 -- 154/83 109 92 % --   05/21/24 2030 -- 75 17 136/65 92 94 % --   05/21/24 1730 -- 81 17 170/84 117 95 % None (Room air)   05/21/24 1700 -- 68 -- 134/64 92 93 % --   05/21/24 1530 -- 81 -- 149/70 100 96 % --   05/21/24 1527 -- -- -- -- -- -- None (Room air)   05/21/24 1500 -- 83 -- 153/72 104 94 % None (Room air)   05/21/24 1430 -- 89 18 158/77 110 94 % None (Room air)   05/21/24 1400 -- 77 16 144/67 96 94 % --   05/21/24 1330 -- 81 16 141/67 96 93 % --   05/21/24 1230 -- 86 16 156/77 110 94 % --    05/21/24 1130 -- 85 -- 143/73 102 95 % --   05/21/24 1115 -- 84 16 131/77 99 92 % --   05/21/24 1100 -- 89 -- -- -- 94 % --     Pertinent Labs/Diagnostic Test Results:   CT abdomen pelvis with contrast   Final Result by Stephanie Castañeda MD (05/21 1337)      Mild peripancreatic mesenteric fat haziness, nonspecific. Correlate to exclude symptoms of pancreatitis. Otherwise unremarkable pancreas.   Hepatomegaly.   Sigmoid diverticulosis. No evidence of diverticulitis.   Minor findings, as per the body of the report.         Workstation performed: [id number]         XR chest portable    (Results Pending)         Results from last 7 days   Lab Units 05/22/24  0507 05/21/24  1103   WBC Thousand/uL 12.37* 16.63*   HEMOGLOBIN g/dL 12.6 14.2   HEMATOCRIT % 37.7 42.6   PLATELETS Thousands/uL 215 265   TOTAL NEUT ABS Thousands/µL  --  12.18*         Results from last 7 days   Lab Units 05/22/24  0507 05/21/24  1103   SODIUM mmol/L 136 134*   POTASSIUM mmol/L 3.8 3.6   CHLORIDE mmol/L 106 103   CO2 mmol/L 24 19*   ANION GAP mmol/L 6 12   BUN mg/dL 11 13   CREATININE mg/dL 0.95 1.01   EGFR ml/min/1.73sq m 84 78   CALCIUM mg/dL 8.9 9.3   MAGNESIUM mg/dL 2.0  --    PHOSPHORUS mg/dL 2.9  --      Results from last 7 days   Lab Units 05/22/24  0507 05/21/24  1103   AST U/L 20 31   ALT U/L 38 58*   ALK PHOS U/L 62 75   TOTAL PROTEIN g/dL 6.6 7.6   ALBUMIN g/dL 3.8 4.5   TOTAL BILIRUBIN mg/dL 0.82 0.78         Results from last 7 days   Lab Units 05/22/24  0507 05/21/24  1103   GLUCOSE RANDOM mg/dL 119 209*           Results from last 7 days   Lab Units 05/22/24  0507 05/21/24  1103   LIPASE u/L 216* 682*         ED Treatment:   Medication Administration from 05/21/2024 1015 to 05/22/2024 0811         Date/Time Order Dose Route Action     05/21/2024 1103 EDT sodium chloride 0.9 % bolus 1,000 mL 1,000 mL Intravenous New Bag     05/21/2024 1152 EDT iohexol (OMNIPAQUE) 350 MG/ML injection (MULTI-DOSE) 100 mL 100 mL Intravenous Given      05/21/2024 1427 EDT HYDROmorphone HCl (DILAUDID) injection 0.2 mg 0.2 mg Intravenous Given     05/22/2024 0638 EDT heparin (porcine) subcutaneous injection 5,000 Units 5,000 Units Subcutaneous Given     05/21/2024 2255 EDT heparin (porcine) subcutaneous injection 5,000 Units 5,000 Units Subcutaneous Given     05/21/2024 1519 EDT heparin (porcine) subcutaneous injection 5,000 Units 5,000 Units Subcutaneous Given     05/22/2024 0638 EDT ketorolac (TORADOL) injection 15 mg 15 mg Intravenous Given     05/21/2024 2342 EDT ketorolac (TORADOL) injection 15 mg 15 mg Intravenous Given     05/21/2024 2138 EDT ketorolac (TORADOL) injection 15 mg 15 mg Intravenous Given     05/21/2024 1519 EDT ketorolac (TORADOL) injection 15 mg 15 mg Intravenous Given     05/21/2024 1518 EDT pantoprazole (PROTONIX) injection 40 mg 40 mg Intravenous Given     05/22/2024 0510 EDT lactated ringers infusion 125 mL/hr Intravenous New Bag     05/21/2024 1519 EDT lactated ringers infusion 125 mL/hr Intravenous New Bag     05/21/2024 2138 EDT gabapentin (NEURONTIN) capsule 300 mg 300 mg Oral Given          Past Medical History:   Diagnosis Date    High cholesterol     Restless leg syndrome      Present on Admission:   Gallstone pancreatitis      Admitting Diagnosis: Abdominal pain [R10.9]  Age/Sex: 63 y.o. male  Admission Orders:  Scheduled Medications:  gabapentin, 300 mg, Oral, HS  heparin (porcine), 5,000 Units, Subcutaneous, Q8H KATHY  ketorolac, 15 mg, Intravenous, Q6H KATHY  pantoprazole, 40 mg, Intravenous, Q24H KATHY      Continuous IV Infusions:  lactated ringers, 125 mL/hr, Intravenous, Continuous      PRN Meds:  acetaminophen, 650 mg, Oral, Q6H PRN  HYDROmorphone, 0.5 mg, Intravenous, Q3H PRN  HYDROmorphone, 0.2 mg, Intravenous, Q3H PRN  ondansetron, 4 mg, Intravenous, Q6H PRN        None    Network Utilization Review Department  ATTENTION: Please call with any questions or concerns to [phone] and carefully listen to the prompts so that you  are directed to the right person. All voicemails are confidential.   For Discharge needs, contact Care Management DC Support Team at [phone] opt. [phone]  Send all requests for admission clinical reviews, approved or denied determinations and any other requests to dedicated fax number below belonging to the campus where the patient is receiving treatment. List of dedicated fax numbers for the Facilities:  FACILITY NAME UR FAX NUMBER   ADMISSION DENIALS (Administrative/Medical Necessity) [phone]   DISCHARGE SUPPORT TEAM (NETWORK) [phone]   PARENT CHILD HEALTH (Maternity/NICU/Pediatrics) [phone]   [facility] [phone]   [facility] [phone]   [facility] [phone]   [facility] [phone]   [facility] [phone]   General acute hospital [phone]   [facility] [phone]   [facility] [phone]   [facility] [phone]   [facility] [phone]   [facility] [phone]   [facility] [phone]

## 2024-05-22 NOTE — PROGRESS NOTES
[facility]  Progress Note  Name: Don Valdez I  MRN: [id number]  Unit/Bed#: ED 28 I Date of Admission: 5/21/2024   Date of Service: 5/22/2024 I Hospital Day: 0    Assessment & Plan   * Gallstone pancreatitis  Assessment & Plan  Assessment:  Acute gallstone pancreatitis, no evidence of choledocholithiasis or biliary obstruction currently.    Plan:  Clinically stable and improving today with resolution of nausea and vomiting but persistent mild to moderate epigastric discomfort and bloating.  The nature of his condition and the treatment plan reviewed in detail.  All questions answered to his understanding and satisfaction.  Will continue clear liquids today, continued IV fluids and supportive care and allow additional 24 hours prior to proceeding with laparoscopic cholecystectomy tomorrow 5/23/2024. Reviewed with Dr. Bethea.           Subjective/Objective   Chief Complaint: better    Subjective: States the nausea and vomiting resolved, abdominal pain slightly better today still in the upper abdomen.  Flatus no bowel movement.  No fevers chills chest pain or trouble breathing.    Objective:     Blood pressure 126/75, pulse 76, temperature 98.2 °F (36.8 °C), resp. rate 18, SpO2 91%.,There is no height or weight on file to calculate BMI.      Intake/Output Summary (Last 24 hours) at 5/22/2024 0908  Last data filed at 5/22/2024 0505  Gross per 24 hour   Intake 2000 ml   Output --   Net 2000 ml       Invasive Devices       Peripheral Intravenous Line  Duration             Peripheral IV 05/21/24 Distal;Left;Ventral (anterior) Forearm <1 day                    Physical Exam  Vitals and nursing note reviewed.   Constitutional:       General: He is not in acute distress.     Appearance: He is well-developed. He is not diaphoretic.   HENT:      Head: Normocephalic and atraumatic.   Eyes:      Extraocular Movements: EOM normal.      Conjunctiva/sclera: Conjunctivae normal.      Pupils: Pupils are  equal, round, and reactive to light.   Pulmonary:      Effort: No respiratory distress.   Abdominal:      Comments: Round, mild gaseous distention, soft/compressible, mild to moderate TTP in epigastrium, no guarding or rigidity. Negative [place] sign.   Musculoskeletal:         General: Normal range of motion.      Cervical back: Normal range of motion.   Skin:     General: Skin is warm and dry.      Capillary Refill: Capillary refill takes less than 2 seconds.   Neurological:      Mental Status: He is alert and oriented to person, place, and time.   Psychiatric:         Mood and Affect: Mood and affect normal.         Behavior: Behavior normal.           Lab, Imaging and other studies:I have personally reviewed pertinent lab results.  , CBC:   Lab Results   Component Value Date    WBC 12.37 (H) 05/22/2024    HGB 12.6 05/22/2024    HCT 37.7 05/22/2024    MCV 91 05/22/2024     05/22/2024    RBC 4.13 05/22/2024    MCH 30.5 05/22/2024    MCHC 33.4 05/22/2024    RDW 14.4 05/22/2024    MPV 10.4 05/22/2024    NRBC 0 05/21/2024   , CMP:   Lab Results   Component Value Date    SODIUM 136 05/22/2024    K 3.8 05/22/2024     05/22/2024    CO2 24 05/22/2024    BUN 11 05/22/2024    CREATININE 0.95 05/22/2024    CALCIUM 8.9 05/22/2024    AST 20 05/22/2024    ALT 38 05/22/2024    ALKPHOS 62 05/22/2024    EGFR 84 05/22/2024     VTE Pharmacologic Prophylaxis: Heparin  VTE Mechanical Prophylaxis: sequential compression device

## 2024-05-23 ENCOUNTER — ANESTHESIA EVENT (INPATIENT)
Dept: PERIOP | Facility: HOSPITAL | Age: 64
DRG: 419 | End: 2024-05-23
Payer: COMMERCIAL

## 2024-05-23 ENCOUNTER — ANESTHESIA (INPATIENT)
Dept: PERIOP | Facility: HOSPITAL | Age: 64
DRG: 419 | End: 2024-05-23
Payer: COMMERCIAL

## 2024-05-23 ENCOUNTER — APPOINTMENT (INPATIENT)
Dept: RADIOLOGY | Facility: HOSPITAL | Age: 64
DRG: 419 | End: 2024-05-23
Payer: COMMERCIAL

## 2024-05-23 PROBLEM — E78.5 HYPERLIPIDEMIA: Status: ACTIVE | Noted: 2024-05-23

## 2024-05-23 PROBLEM — K80.67 CALCULUS OF GALLBLADDER AND BILE DUCT WITH ACUTE AND CHRONIC CHOLECYSTITIS WITH OBSTRUCTION: Status: ACTIVE | Noted: 2024-05-23

## 2024-05-23 LAB
ALBUMIN SERPL BCP-MCNC: 3.7 G/DL (ref 3.5–5)
ALP SERPL-CCNC: 63 U/L (ref 34–104)
ALT SERPL W P-5'-P-CCNC: 33 U/L (ref 7–52)
ANION GAP SERPL CALCULATED.3IONS-SCNC: 8 MMOL/L (ref 4–13)
AST SERPL W P-5'-P-CCNC: 18 U/L (ref 13–39)
BILIRUB SERPL-MCNC: 0.74 MG/DL (ref 0.2–1)
BUN SERPL-MCNC: 10 MG/DL (ref 5–25)
CALCIUM SERPL-MCNC: 9.2 MG/DL (ref 8.4–10.2)
CHLORIDE SERPL-SCNC: 107 MMOL/L (ref 96–108)
CO2 SERPL-SCNC: 25 MMOL/L (ref 21–32)
CREAT SERPL-MCNC: 0.93 MG/DL (ref 0.6–1.3)
ERYTHROCYTE [DISTWIDTH] IN BLOOD BY AUTOMATED COUNT: 13.8 % (ref 11.6–15.1)
GFR SERPL CREATININE-BSD FRML MDRD: 87 ML/MIN/1.73SQ M
GLUCOSE SERPL-MCNC: 125 MG/DL (ref 65–140)
HCT VFR BLD AUTO: 37.8 % (ref 36.5–49.3)
HGB BLD-MCNC: 12.7 G/DL (ref 12–17)
MCH RBC QN AUTO: 30.8 PG (ref 26.8–34.3)
MCHC RBC AUTO-ENTMCNC: 33.6 G/DL (ref 31.4–37.4)
MCV RBC AUTO: 92 FL (ref 82–98)
PLATELET # BLD AUTO: 238 THOUSANDS/UL (ref 149–390)
PMV BLD AUTO: 10 FL (ref 8.9–12.7)
POTASSIUM SERPL-SCNC: 3.9 MMOL/L (ref 3.5–5.3)
PROT SERPL-MCNC: 6.5 G/DL (ref 6.4–8.4)
RBC # BLD AUTO: 4.13 MILLION/UL (ref 3.88–5.62)
SODIUM SERPL-SCNC: 140 MMOL/L (ref 135–147)
WBC # BLD AUTO: 9.77 THOUSAND/UL (ref 4.31–10.16)

## 2024-05-23 PROCEDURE — 0FT44ZZ RESECTION OF GALLBLADDER, PERCUTANEOUS ENDOSCOPIC APPROACH: ICD-10-PCS | Performed by: SURGERY

## 2024-05-23 PROCEDURE — BF131ZZ FLUOROSCOPY OF GALLBLADDER AND BILE DUCTS USING LOW OSMOLAR CONTRAST: ICD-10-PCS | Performed by: SURGERY

## 2024-05-23 PROCEDURE — 85027 COMPLETE CBC AUTOMATED: CPT | Performed by: PHYSICIAN ASSISTANT

## 2024-05-23 PROCEDURE — 80053 COMPREHEN METABOLIC PANEL: CPT | Performed by: PHYSICIAN ASSISTANT

## 2024-05-23 PROCEDURE — C9113 INJ PANTOPRAZOLE SODIUM, VIA: HCPCS | Performed by: PHYSICIAN ASSISTANT

## 2024-05-23 PROCEDURE — 47563 LAPARO CHOLECYSTECTOMY/GRAPH: CPT | Performed by: PHYSICIAN ASSISTANT

## 2024-05-23 PROCEDURE — 88304 TISSUE EXAM BY PATHOLOGIST: CPT | Performed by: PATHOLOGY

## 2024-05-23 PROCEDURE — 47563 LAPARO CHOLECYSTECTOMY/GRAPH: CPT | Performed by: SURGERY

## 2024-05-23 PROCEDURE — 74300 X-RAY BILE DUCTS/PANCREAS: CPT

## 2024-05-23 RX ORDER — MAGNESIUM HYDROXIDE 1200 MG/15ML
LIQUID ORAL AS NEEDED
Status: DISCONTINUED | OUTPATIENT
Start: 2024-05-23 | End: 2024-05-23 | Stop reason: HOSPADM

## 2024-05-23 RX ORDER — ONDANSETRON 2 MG/ML
4 INJECTION INTRAMUSCULAR; INTRAVENOUS EVERY 6 HOURS PRN
Status: DISCONTINUED | OUTPATIENT
Start: 2024-05-23 | End: 2024-05-24

## 2024-05-23 RX ORDER — GLYCOPYRROLATE 0.2 MG/ML
INJECTION INTRAMUSCULAR; INTRAVENOUS AS NEEDED
Status: DISCONTINUED | OUTPATIENT
Start: 2024-05-23 | End: 2024-05-23

## 2024-05-23 RX ORDER — MIDAZOLAM HYDROCHLORIDE 2 MG/2ML
INJECTION, SOLUTION INTRAMUSCULAR; INTRAVENOUS AS NEEDED
Status: DISCONTINUED | OUTPATIENT
Start: 2024-05-23 | End: 2024-05-23

## 2024-05-23 RX ORDER — HEPARIN SODIUM 5000 [USP'U]/ML
5000 INJECTION, SOLUTION INTRAVENOUS; SUBCUTANEOUS EVERY 8 HOURS SCHEDULED
Status: DISCONTINUED | OUTPATIENT
Start: 2024-05-24 | End: 2024-05-24 | Stop reason: HOSPADM

## 2024-05-23 RX ORDER — DEXAMETHASONE SODIUM PHOSPHATE 10 MG/ML
INJECTION, SOLUTION INTRAMUSCULAR; INTRAVENOUS AS NEEDED
Status: DISCONTINUED | OUTPATIENT
Start: 2024-05-23 | End: 2024-05-23

## 2024-05-23 RX ORDER — SODIUM CHLORIDE, SODIUM LACTATE, POTASSIUM CHLORIDE, CALCIUM CHLORIDE 600; 310; 30; 20 MG/100ML; MG/100ML; MG/100ML; MG/100ML
100 INJECTION, SOLUTION INTRAVENOUS CONTINUOUS
Status: DISCONTINUED | OUTPATIENT
Start: 2024-05-23 | End: 2024-05-23

## 2024-05-23 RX ORDER — BUPIVACAINE HYDROCHLORIDE 5 MG/ML
INJECTION, SOLUTION EPIDURAL; INTRACAUDAL AS NEEDED
Status: DISCONTINUED | OUTPATIENT
Start: 2024-05-23 | End: 2024-05-23 | Stop reason: HOSPADM

## 2024-05-23 RX ORDER — CEFAZOLIN SODIUM 1 G/3ML
INJECTION, POWDER, FOR SOLUTION INTRAMUSCULAR; INTRAVENOUS AS NEEDED
Status: DISCONTINUED | OUTPATIENT
Start: 2024-05-23 | End: 2024-05-23

## 2024-05-23 RX ORDER — SODIUM CHLORIDE, SODIUM LACTATE, POTASSIUM CHLORIDE, CALCIUM CHLORIDE 600; 310; 30; 20 MG/100ML; MG/100ML; MG/100ML; MG/100ML
20 INJECTION, SOLUTION INTRAVENOUS CONTINUOUS
Status: DISCONTINUED | OUTPATIENT
Start: 2024-05-23 | End: 2024-05-23

## 2024-05-23 RX ORDER — FENTANYL CITRATE/PF 50 MCG/ML
25 SYRINGE (ML) INJECTION
Status: DISCONTINUED | OUTPATIENT
Start: 2024-05-23 | End: 2024-05-23 | Stop reason: HOSPADM

## 2024-05-23 RX ORDER — SODIUM CHLORIDE, SODIUM LACTATE, POTASSIUM CHLORIDE, CALCIUM CHLORIDE 600; 310; 30; 20 MG/100ML; MG/100ML; MG/100ML; MG/100ML
INJECTION, SOLUTION INTRAVENOUS CONTINUOUS PRN
Status: DISCONTINUED | OUTPATIENT
Start: 2024-05-23 | End: 2024-05-23

## 2024-05-23 RX ORDER — ONDANSETRON 2 MG/ML
4 INJECTION INTRAMUSCULAR; INTRAVENOUS ONCE AS NEEDED
Status: DISCONTINUED | OUTPATIENT
Start: 2024-05-23 | End: 2024-05-23 | Stop reason: HOSPADM

## 2024-05-23 RX ORDER — PROPOFOL 10 MG/ML
INJECTION, EMULSION INTRAVENOUS AS NEEDED
Status: DISCONTINUED | OUTPATIENT
Start: 2024-05-23 | End: 2024-05-23

## 2024-05-23 RX ORDER — LIDOCAINE HCL/PF 100 MG/5ML
SYRINGE (ML) INJECTION AS NEEDED
Status: DISCONTINUED | OUTPATIENT
Start: 2024-05-23 | End: 2024-05-23

## 2024-05-23 RX ORDER — ONDANSETRON 2 MG/ML
INJECTION INTRAMUSCULAR; INTRAVENOUS AS NEEDED
Status: DISCONTINUED | OUTPATIENT
Start: 2024-05-23 | End: 2024-05-23

## 2024-05-23 RX ORDER — FENTANYL CITRATE 50 UG/ML
INJECTION, SOLUTION INTRAMUSCULAR; INTRAVENOUS AS NEEDED
Status: DISCONTINUED | OUTPATIENT
Start: 2024-05-23 | End: 2024-05-23

## 2024-05-23 RX ORDER — ROCURONIUM BROMIDE 10 MG/ML
INJECTION, SOLUTION INTRAVENOUS AS NEEDED
Status: DISCONTINUED | OUTPATIENT
Start: 2024-05-23 | End: 2024-05-23

## 2024-05-23 RX ADMIN — PANTOPRAZOLE SODIUM 40 MG: 40 INJECTION, POWDER, FOR SOLUTION INTRAVENOUS at 10:19

## 2024-05-23 RX ADMIN — ONDANSETRON 4 MG: 2 INJECTION INTRAMUSCULAR; INTRAVENOUS at 08:23

## 2024-05-23 RX ADMIN — MIDAZOLAM 2 MG: 1 INJECTION INTRAMUSCULAR; INTRAVENOUS at 07:32

## 2024-05-23 RX ADMIN — SODIUM CHLORIDE, SODIUM LACTATE, POTASSIUM CHLORIDE, AND CALCIUM CHLORIDE: .6; .31; .03; .02 INJECTION, SOLUTION INTRAVENOUS at 08:26

## 2024-05-23 RX ADMIN — FENTANYL CITRATE 50 MCG: 50 INJECTION, SOLUTION INTRAMUSCULAR; INTRAVENOUS at 08:47

## 2024-05-23 RX ADMIN — PROPOFOL 200 MG: 10 INJECTION, EMULSION INTRAVENOUS at 07:33

## 2024-05-23 RX ADMIN — KETOROLAC TROMETHAMINE 15 MG: 30 INJECTION, SOLUTION INTRAMUSCULAR; INTRAVENOUS at 21:18

## 2024-05-23 RX ADMIN — SUGAMMADEX 180 MG: 100 INJECTION, SOLUTION INTRAVENOUS at 08:44

## 2024-05-23 RX ADMIN — PROPOFOL 50 MG: 10 INJECTION, EMULSION INTRAVENOUS at 08:26

## 2024-05-23 RX ADMIN — KETOROLAC TROMETHAMINE 15 MG: 30 INJECTION, SOLUTION INTRAMUSCULAR; INTRAVENOUS at 04:41

## 2024-05-23 RX ADMIN — KETOROLAC TROMETHAMINE 15 MG: 30 INJECTION, SOLUTION INTRAMUSCULAR; INTRAVENOUS at 16:07

## 2024-05-23 RX ADMIN — SODIUM CHLORIDE, SODIUM LACTATE, POTASSIUM CHLORIDE, AND CALCIUM CHLORIDE 125 ML/HR: .6; .31; .03; .02 INJECTION, SOLUTION INTRAVENOUS at 01:21

## 2024-05-23 RX ADMIN — CEFAZOLIN 2000 MG: 1 INJECTION, POWDER, FOR SOLUTION INTRAMUSCULAR; INTRAVENOUS at 07:41

## 2024-05-23 RX ADMIN — SODIUM CHLORIDE, SODIUM LACTATE, POTASSIUM CHLORIDE, AND CALCIUM CHLORIDE: .6; .31; .03; .02 INJECTION, SOLUTION INTRAVENOUS at 07:32

## 2024-05-23 RX ADMIN — DEXAMETHASONE SODIUM PHOSPHATE 7 MG: 10 INJECTION, SOLUTION INTRAMUSCULAR; INTRAVENOUS at 07:33

## 2024-05-23 RX ADMIN — GLUCAGON 1 MG: KIT at 08:18

## 2024-05-23 RX ADMIN — ROCURONIUM BROMIDE 50 MG: 10 INJECTION, SOLUTION INTRAVENOUS at 07:33

## 2024-05-23 RX ADMIN — FENTANYL CITRATE 50 MCG: 50 INJECTION, SOLUTION INTRAMUSCULAR; INTRAVENOUS at 08:26

## 2024-05-23 RX ADMIN — GABAPENTIN 300 MG: 300 CAPSULE ORAL at 21:18

## 2024-05-23 RX ADMIN — FENTANYL CITRATE 50 MCG: 50 INJECTION, SOLUTION INTRAMUSCULAR; INTRAVENOUS at 07:32

## 2024-05-23 RX ADMIN — FENTANYL CITRATE 50 MCG: 50 INJECTION, SOLUTION INTRAMUSCULAR; INTRAVENOUS at 07:44

## 2024-05-23 RX ADMIN — GLYCOPYRROLATE 0.2 MG: 0.2 INJECTION INTRAMUSCULAR; INTRAVENOUS at 07:32

## 2024-05-23 RX ADMIN — LIDOCAINE HYDROCHLORIDE 100 MG: 20 INJECTION INTRAVENOUS at 07:33

## 2024-05-23 NOTE — DISCHARGE INSTR - AVS FIRST PAGE
[facility]    Discharge Instructions  Light activity for 2 weeks.  No heavy lifting for 2 weeks.  Max 10 lbs for 2 weeks.  No driving for 3-7 days or until pain is well controlled.  Surgical glue will fall off with time.  You may shower starting tomorrow.  Take discharge medications as prescribed.  Notify our office for nausea, vomiting, fever, diarrhea, chest pain, trouble breathing.  Follow up in our office in 2 weeks or sooner if needed.  Call with additional questions or concerns [phone].    For pain you may take ibuprofen 600 mg every 6 hours scheduled for 3 days then as needed.  You can also take acetaminophen 650 mg every 6 hours scheduled for 3 days then as needed.  For ongoing pain you can alternate ibuprofen and acetaminophen every 3 hours.  If pain not controlled with this regimen you can use Oxycodone as prescribed.  Ice packs may be helpful, 20 min on, 20 min off alternating and monitoring skin.    Please get follow up lab work one to two days prior to your follow up appointment

## 2024-05-23 NOTE — ANESTHESIA PREPROCEDURE EVALUATION
Procedure:  CHOLECYSTECTOMY LAPAROSCOPIC W/ INTRAOP CHOLANGIOGRAM (Abdomen)    Relevant Problems   ANESTHESIA (within normal limits)      CARDIO   (+) Hyperlipidemia      ENDO (within normal limits)      GI/HEPATIC   (+) Gallstone pancreatitis      /RENAL (within normal limits)      HEMATOLOGY (within normal limits)      MUSCULOSKELETAL (within normal limits)      NEURO/PSYCH (within normal limits)      PULMONARY (within normal limits)        Physical Exam    Airway    Mallampati score: I  TM Distance: >3 FB  Neck ROM: full     Dental    upper dentures and lower dentures    Cardiovascular  Cardiovascular exam normal    Pulmonary  Pulmonary exam normal     Other Findings        Anesthesia Plan  ASA Score- 2     Anesthesia Type- general with ASA Monitors.         Additional Monitors:     Airway Plan: ETT.           Plan Factors-Exercise tolerance (METS): >4 METS.    Chart reviewed. EKG reviewed. Imaging results reviewed. Existing labs reviewed. Patient summary reviewed.    Patient is not a current smoker.      Obstructive sleep apnea risk education given perioperatively.        Induction- intravenous.    Postoperative Plan- Plan for postoperative opioid use. Planned trial extubation        Informed Consent- Anesthetic plan and risks discussed with patient.  I personally reviewed this patient with the CRNA. Discussed and agreed on the Anesthesia Plan with the CRNA..

## 2024-05-23 NOTE — PLAN OF CARE
Problem: PAIN - ADULT  Goal: Verbalizes/displays adequate comfort level or baseline comfort level  Description: Interventions:  - Encourage patient to monitor pain and request assistance  - Assess pain using appropriate pain scale  - Administer analgesics based on type and severity of pain and evaluate response  - Implement non-pharmacological measures as appropriate and evaluate response  - Consider cultural and social influences on pain and pain management  - Notify physician/advanced practitioner if interventions unsuccessful or patient reports new pain  Outcome: Progressing     Problem: INFECTION - ADULT  Goal: Absence or prevention of progression during hospitalization  Description: INTERVENTIONS:  - Assess and monitor for signs and symptoms of infection  - Monitor lab/diagnostic results  - Monitor all insertion sites, i.e. indwelling lines, tubes, and drains  - Monitor endotracheal if appropriate and nasal secretions for changes in amount and color  - [place] appropriate cooling/warming therapies per order  - Administer medications as ordered  - Instruct and encourage patient and family to use good hand hygiene technique  - Identify and instruct in appropriate isolation precautions for identified infection/condition  Outcome: Progressing     Problem: Knowledge Deficit  Goal: Patient/family/caregiver demonstrates understanding of disease process, treatment plan, medications, and discharge instructions  Description: Complete learning assessment and assess knowledge base.  Interventions:  - Provide teaching at level of understanding  - Provide teaching via preferred learning methods  Outcome: Progressing     Problem: GASTROINTESTINAL - ADULT  Goal: Minimal or absence of nausea and/or vomiting  Description: INTERVENTIONS:  - Administer IV fluids if ordered to ensure adequate hydration  - Maintain NPO status until nausea and vomiting are resolved  - Nasogastric tube if ordered  - Administer ordered antiemetic  medications as needed  - Provide nonpharmacologic comfort measures as appropriate  - Advance diet as tolerated, if ordered  - Consider nutrition services referral to assist patient with adequate nutrition and appropriate food choices  Outcome: Progressing

## 2024-05-23 NOTE — PLAN OF CARE
Problem: PAIN - ADULT  Goal: Verbalizes/displays adequate comfort level or baseline comfort level  Description: Interventions:  - Encourage patient to monitor pain and request assistance  - Assess pain using appropriate pain scale  - Administer analgesics based on type and severity of pain and evaluate response  - Implement non-pharmacological measures as appropriate and evaluate response  - Consider cultural and social influences on pain and pain management  - Notify physician/advanced practitioner if interventions unsuccessful or patient reports new pain  Outcome: Progressing     Problem: INFECTION - ADULT  Goal: Absence or prevention of progression during hospitalization  Description: INTERVENTIONS:  - Assess and monitor for signs and symptoms of infection  - Monitor lab/diagnostic results  - Monitor all insertion sites, i.e. indwelling lines, tubes, and drains  - Monitor endotracheal if appropriate and nasal secretions for changes in amount and color  - [place] appropriate cooling/warming therapies per order  - Administer medications as ordered  - Instruct and encourage patient and family to use good hand hygiene technique  - Identify and instruct in appropriate isolation precautions for identified infection/condition  Outcome: Progressing     Problem: DISCHARGE PLANNING  Goal: Discharge to home or other facility with appropriate resources  Description: INTERVENTIONS:  - Identify barriers to discharge w/patient and caregiver  - Arrange for needed discharge resources and transportation as appropriate  - Identify discharge learning needs (meds, wound care, etc.)  - Arrange for interpretive services to assist at discharge as needed  - Refer to Case Management Department for coordinating discharge planning if the patient needs post-hospital services based on physician/advanced practitioner order or complex needs related to functional status, cognitive ability, or social support system  Outcome: Progressing      Problem: Knowledge Deficit  Goal: Patient/family/caregiver demonstrates understanding of disease process, treatment plan, medications, and discharge instructions  Description: Complete learning assessment and assess knowledge base.  Interventions:  - Provide teaching at level of understanding  - Provide teaching via preferred learning methods  Outcome: Progressing     Problem: GASTROINTESTINAL - ADULT  Goal: Minimal or absence of nausea and/or vomiting  Description: INTERVENTIONS:  - Administer IV fluids if ordered to ensure adequate hydration  - Maintain NPO status until nausea and vomiting are resolved  - Nasogastric tube if ordered  - Administer ordered antiemetic medications as needed  - Provide nonpharmacologic comfort measures as appropriate  - Advance diet as tolerated, if ordered  - Consider nutrition services referral to assist patient with adequate nutrition and appropriate food choices  Outcome: Progressing  Goal: Maintains or returns to baseline bowel function  Description: INTERVENTIONS:  - Assess bowel function  - Encourage oral fluids to ensure adequate hydration  - Administer IV fluids if ordered to ensure adequate hydration  - Administer ordered medications as needed  - Encourage mobilization and activity  - Consider nutritional services referral to assist patient with adequate nutrition and appropriate food choices  Outcome: Progressing

## 2024-05-23 NOTE — UTILIZATION REVIEW
Continued Stay Review  SEE INITIAL REVIEW COMPLETED ON 5/23 by Fariha Rome RN  Date: 5/23                          Current Patient Class: INpatient   Current Level of Care: Med/surg    HPI:63 y.o. male initially admitted on 5/22     Assessment/Plan:   OPERATIVE NOTE  SURGERY DATE: 5/23/2024   Procedure(s):  CHOLECYSTECTOMY LAPAROSCOPIC W/ INTRAOP CHOLANGIOGRAM  Anesthesia Type:   General  Operative Findings:  The patient was found to have acute on chronic calculous cholecystitis.  Laparoscopic cholecystectomy was performed in the routine fashion.     During the course the dissection the critical structures defined.  The cystic duct dissected out circumferentially.  The cystic artery dissected out circumferentially.      Intraoperative cholangiogram performed revealing a dilated cystic duct and obstructed common bile duct distally at the ampulla.  There was a positive meniscus sign.  Glucagon was administered without relief of the obstructed common bile duct.  Vital Signs: Vital Signs (last 2 days)      Date/Time Temp Pulse Resp BP MAP (mmHg) SpO2 O2 Flow Rate (L/min) O2 Device Patient Position - Orthostatic VS   05/23/24 14:28:29 98.8 °F (37.1 °C) 94 18 139/76 97 92 % -- -- --   05/23/24 13:32:55 98.6 °F (37 °C) 98 18 154/76 102 92 % -- -- --   05/23/24 12:36:43 98.1 °F (36.7 °C) 97 16 147/83 104 94 % -- -- --   05/23/24 11:40:09 98 °F (36.7 °C) 90 18 147/84 105 88 % Abnormal  -- -- --   05/23/24 10:29:13 97.8 °F (36.6 °C) 89 16 140/82 101 90 % -- -- --   05/23/24 1000 -- -- -- -- -- 89 % Abnormal  2 L/min Nasal cannula --   05/23/24 09:59:05 97.7 °F (36.5 °C) 72 16 134/78 97 94 % -- -- --   05/23/24 0940 -- 78 16 135/73 98 98 % 2 L/min Nasal cannula --   05/23/24 0930 -- 76 16 131/67 94 95 % 3 L/min Nasal cannula --   05/23/24 0920 -- 76 16 135/71 97 93 % 3 L/min Nasal cannula --   05/23/24 0910 -- 78 16 140/77 102 92 % 3 L/min Nasal cannula --   05/23/24 0900 -- 76 18 127/73 96 94 % 4 L/min Simple mask --    05/23/24 0852 97.1 °F (36.2 °C) Abnormal  78 16 144/82 106 93 % 4 L/min Simple mask --   05/23/24 0643 98.1 °F (36.7 °C) 90 18 143/83 -- 93 % -- None (Room air)      Pertinent Labs/Diagnostic Results:   5/23 CXR: No acute cardiopulmonary disease.   5/22 EKG: Narrative & Impression    Sinus rhythm with Premature atrial complexes  Otherwise normal ECG  No previous ECGs available  Sinus rhythm with Premature atrial complexes  Otherwise normal ECG  No previous ECGs available     Results from last 7 days   Lab Units 05/23/24 0438 05/22/24 0507 05/21/24  1103   WBC Thousand/uL 9.77 12.37* 16.63*   HEMOGLOBIN g/dL 12.7 12.6 14.2   HEMATOCRIT % 37.8 37.7 42.6   PLATELETS Thousands/uL 238 215 265   TOTAL NEUT ABS Thousands/µL  --   --  12.18*         Results from last 7 days   Lab Units 05/23/24 0438 05/22/24  0507 05/21/24  1103   SODIUM mmol/L 140 136 134*   POTASSIUM mmol/L 3.9 3.8 3.6   CHLORIDE mmol/L 107 106 103   CO2 mmol/L 25 24 19*   ANION GAP mmol/L 8 6 12   BUN mg/dL 10 11 13   CREATININE mg/dL 0.93 0.95 1.01   EGFR ml/min/1.73sq m 87 84 78   CALCIUM mg/dL 9.2 8.9 9.3   MAGNESIUM mg/dL  --  2.0  --    PHOSPHORUS mg/dL  --  2.9  --      Results from last 7 days   Lab Units 05/23/24 0438 05/22/24  0507 05/21/24  1103   AST U/L 18 20 31   ALT U/L 33 38 58*   ALK PHOS U/L 63 62 75   TOTAL PROTEIN g/dL 6.5 6.6 7.6   ALBUMIN g/dL 3.7 3.8 4.5   TOTAL BILIRUBIN mg/dL 0.74 0.82 0.78         Results from last 7 days   Lab Units 05/23/24 0438 05/22/24  0507 05/21/24  1103   GLUCOSE RANDOM mg/dL 125 119 209*             Results from last 7 days   Lab Units 05/22/24  0507 05/21/24  1103   LIPASE u/L 216* 682*       Medications:   Scheduled Medications:  gabapentin, 300 mg, Oral, HS  [START ON 5/24/2024] heparin (porcine), 5,000 Units, Subcutaneous, Q8H KATHY  ketorolac, 15 mg, Intravenous, Q6H KATHY  pantoprazole, 40 mg, Intravenous, Q24H KATHY      Continuous IV Infusions:     PRN Meds:  acetaminophen, 650 mg, Oral, Q6H  PRN  HYDROmorphone, 0.5 mg, Intravenous, Q3H PRN  HYDROmorphone, 0.2 mg, Intravenous, Q3H PRN  ondansetron, 4 mg, Intravenous, Q6H PRN  ondansetron, 4 mg, Intravenous, Q6H PRN        Discharge Plan: TBD    Network Utilization Review Department  ATTENTION: Please call with any questions or concerns to [phone] and carefully listen to the prompts so that you are directed to the right person. All voicemails are confidential.   For Discharge needs, contact Care Management DC Support Team at [phone] opt. 2  Send all requests for admission clinical reviews, approved or denied determinations and any other requests to dedicated fax number below belonging to the campus where the patient is receiving treatment. List of dedicated fax numbers for the Facilities:  FACILITY NAME UR FAX NUMBER   ADMISSION DENIALS (Administrative/Medical Necessity) [phone]   DISCHARGE SUPPORT TEAM (NETWORK) [phone]   PARENT CHILD HEALTH (Maternity/NICU/Pediatrics) [phone]   [facility] [phone]   [facility] [phone]   [facility] [phone]   [facility] [phone]   [facility] [phone]   General acute hospital [phone]   [facility] [phone]   [facility] [phone]   [facility] [phone]   [facility] [phone]   [facility] [phone]   [facility] [phone]

## 2024-05-23 NOTE — OP NOTE
OPERATIVE REPORT  PATIENT NAME: Don Valdez    :  1960  MRN: [id number]  Pt Location: [facility] OR ROOM 02    SURGERY DATE: 2024    Surgeons and Role:     * Don Bethea MD - Primary     * Kojo Mart PA-C - Assisting  The PA was necessary to provide expert assistance; i.e. in the form of providing optimal exposure with retraction, suturing, and assistance with dissection in order to perform the most efficient operation and in order to optimize patient safety in the abscence of a qualified surgical resident.    Preop Diagnosis:  Gallstone pancreatitis [K85.10]    Post-Op Diagnosis Codes:     * Gallstone pancreatitis [K85.10]     * Calculus of gallbladder and bile duct with acute and chronic cholecystitis, with obstruction [K80.67]    Procedure(s):  CHOLECYSTECTOMY LAPAROSCOPIC W/ INTRAOP CHOLANGIOGRAM    Specimen(s):  ID Type Source Tests Collected by Time Destination   1 : gallbladder Tissue Gallbladder TISSUE EXAM Don Bethea MD 2024 0821        Estimated Blood Loss:   Minimal    Drains:  NG/OG/Enteral Tube Orogastric 16 Fr Center mouth (Active)   Number of days: 0       Anesthesia Type:   General    Operative Indications:  Gallstone pancreatitis [K85.10]  Patient is a pleasant 64 yo male presenting with signs and symptoms of acute gallstone pancreatitis for which surgery is now indicated.    Operative Findings:  The patient was found to have acute on chronic calculous cholecystitis.  Laparoscopic cholecystectomy was performed in the routine fashion.    During the course the dissection the critical structures defined.  The cystic duct dissected out circumferentially.  The cystic artery dissected out circumferentially.     Intraoperative cholangiogram performed revealing a dilated cystic duct and obstructed common bile duct distally at the ampulla.  There was a positive meniscus sign.  Glucagon was administered without relief of the obstructed common bile duct.    The  cholecystectomy completed laparoscopically.  The patient tolerated the procedure well.    Complications:   None    Procedure and Technique:  The patient was taken to the operating room where they were properly identified, monitored and anesthetized. They received antibiotics perioperatively. Venodyne's were placed prior to the induction of anesthesia for DVT prophylaxis. The abdomen prepped and draped under sterile conditions using aseptic technique. Timeout performed.     Skin incised in the right upper quadrant.. Peritoneal cavity entered bluntly with a 5 mm trocar. Pneumoperitoneum established to 15 mmHg. 4 quadrants of the abdomen and inspected laparoscopically and no additional pathology was identified. 3 additional working ports placed. These were an 11 mm port in the epigastrium and 2 additional 5 mm ports in the right upper quadrant. The patient placed in reverse Trendelenburg, left side down. Gallbladder grasped at its dome retracted cephalad and to the right. Infundibulum grasped and retracted laterally. [place] of [place] defined and skeletonized. Cystic duct dissected out circumferentially. Cystic artery dissected out circumferentially. Cystic artery clipped below and divided above with harmonic rehan. A clip placed on the up side of the cystic duct.  Operative cholangiogram performed with the above findings noted.  Cystic duct clipped twice on the down side and divided between clips. Gallbladder dissected off the liver with harmonic rehan. The gallbladder was placed in an Endobag and delivered through the epigastric trocar site.     Pneumoperitoneum reestablished to 15 mmHg. Scope advanced and the right upper quadrant inspected. Good hemostasis found. The fascial defect at the epigastrium closed with an 0 Vicryl.  Ports removed.  Skin closed with subcuticular 4-0 Monocryl suture. Wounds infiltrated with half percent Marcaine. The wounds dressed. The patient extubated and taken to recovery in stable  condition.     I was present for the entire procedure.    Patient Disposition:  PACU         SIGNATURE: Don Bethea MD  DATE: May 23, 2024  TIME: 8:39 AM

## 2024-05-23 NOTE — ANESTHESIA POSTPROCEDURE EVALUATION
Post-Op Assessment Note    CV Status:  Stable  Pain Score: 0    Pain management: adequate       Mental Status:  Alert and awake   Hydration Status:  Euvolemic   PONV Controlled:  Controlled   Airway Patency:  Patent     Post Op Vitals Reviewed: Yes    No anethesia notable event occurred.    Staff: KATERIN               /82 (05/23/24 0852)    Temp (!) 97.1 °F (36.2 °C) (05/23/24 0852)    Pulse 78 (05/23/24 0852)   Resp 16 (05/23/24 0852)    SpO2 93 % (05/23/24 0852)

## 2024-05-24 VITALS
TEMPERATURE: 97.4 F | OXYGEN SATURATION: 92 % | HEART RATE: 75 BPM | SYSTOLIC BLOOD PRESSURE: 136 MMHG | HEIGHT: 68 IN | WEIGHT: 209.66 LBS | RESPIRATION RATE: 16 BRPM | BODY MASS INDEX: 31.78 KG/M2 | DIASTOLIC BLOOD PRESSURE: 75 MMHG

## 2024-05-24 LAB
ALBUMIN SERPL BCP-MCNC: 3.6 G/DL (ref 3.5–5)
ALP SERPL-CCNC: 62 U/L (ref 34–104)
ALT SERPL W P-5'-P-CCNC: 42 U/L (ref 7–52)
ANION GAP SERPL CALCULATED.3IONS-SCNC: 8 MMOL/L (ref 4–13)
AST SERPL W P-5'-P-CCNC: 31 U/L (ref 13–39)
BILIRUB SERPL-MCNC: 0.47 MG/DL (ref 0.2–1)
BUN SERPL-MCNC: 15 MG/DL (ref 5–25)
CALCIUM SERPL-MCNC: 9.3 MG/DL (ref 8.4–10.2)
CHLORIDE SERPL-SCNC: 106 MMOL/L (ref 96–108)
CO2 SERPL-SCNC: 24 MMOL/L (ref 21–32)
CREAT SERPL-MCNC: 0.99 MG/DL (ref 0.6–1.3)
ERYTHROCYTE [DISTWIDTH] IN BLOOD BY AUTOMATED COUNT: 13.8 % (ref 11.6–15.1)
GFR SERPL CREATININE-BSD FRML MDRD: 80 ML/MIN/1.73SQ M
GLUCOSE SERPL-MCNC: 165 MG/DL (ref 65–140)
HCT VFR BLD AUTO: 34.9 % (ref 36.5–49.3)
HGB BLD-MCNC: 11.7 G/DL (ref 12–17)
LIPASE SERPL-CCNC: 115 U/L (ref 11–82)
MCH RBC QN AUTO: 30.5 PG (ref 26.8–34.3)
MCHC RBC AUTO-ENTMCNC: 33.5 G/DL (ref 31.4–37.4)
MCV RBC AUTO: 91 FL (ref 82–98)
PLATELET # BLD AUTO: 240 THOUSANDS/UL (ref 149–390)
PMV BLD AUTO: 10.4 FL (ref 8.9–12.7)
POTASSIUM SERPL-SCNC: 3.8 MMOL/L (ref 3.5–5.3)
PROT SERPL-MCNC: 6.5 G/DL (ref 6.4–8.4)
RBC # BLD AUTO: 3.84 MILLION/UL (ref 3.88–5.62)
SODIUM SERPL-SCNC: 138 MMOL/L (ref 135–147)
WBC # BLD AUTO: 12.71 THOUSAND/UL (ref 4.31–10.16)

## 2024-05-24 PROCEDURE — 83690 ASSAY OF LIPASE: CPT | Performed by: PHYSICIAN ASSISTANT

## 2024-05-24 PROCEDURE — 85027 COMPLETE CBC AUTOMATED: CPT | Performed by: PHYSICIAN ASSISTANT

## 2024-05-24 PROCEDURE — 80053 COMPREHEN METABOLIC PANEL: CPT | Performed by: PHYSICIAN ASSISTANT

## 2024-05-24 PROCEDURE — 99024 POSTOP FOLLOW-UP VISIT: CPT

## 2024-05-24 PROCEDURE — C9113 INJ PANTOPRAZOLE SODIUM, VIA: HCPCS | Performed by: PHYSICIAN ASSISTANT

## 2024-05-24 RX ORDER — OXYCODONE HYDROCHLORIDE 5 MG/1
5 TABLET ORAL EVERY 4 HOURS PRN
Qty: 10 TABLET | Refills: 0 | Status: SHIPPED | OUTPATIENT
Start: 2024-05-24 | End: 2024-06-03

## 2024-05-24 RX ADMIN — HEPARIN SODIUM 5000 UNITS: 5000 INJECTION, SOLUTION INTRAVENOUS; SUBCUTANEOUS at 05:46

## 2024-05-24 RX ADMIN — PANTOPRAZOLE SODIUM 40 MG: 40 INJECTION, POWDER, FOR SOLUTION INTRAVENOUS at 08:46

## 2024-05-24 RX ADMIN — KETOROLAC TROMETHAMINE 15 MG: 30 INJECTION, SOLUTION INTRAMUSCULAR; INTRAVENOUS at 05:46

## 2024-05-24 NOTE — NURSING NOTE
IV site removed.  Discharge instructions given to patient, verbalized understanding.  Patient discharged via walking to car accompanied by PCA and sister

## 2024-05-24 NOTE — DISCHARGE SUMMARY
"  Discharge Summary - General Surgery   Don Valdez 63 y.o. male MRN: [id number]  Unit/Bed#: -01 Encounter: [id number]    Admission Date:   Admission Orders (From admission, onward)       Ordered        05/22/24 1041  INPATIENT ADMISSION  Once            05/21/24 1421  Place in Observation  Once            05/21/24 1434  Place in Observation  Once                             Discharge Date: 5/24/2024    Admitting Diagnosis: Cholelithiasis [K80.20]  Pancreatitis [K85.90]  Abdominal pain [R10.9]  Gallstone pancreatitis [K85.10]    Discharge Diagnosis: Cholelithiasis [K80.20]  Pancreatitis [K85.90]  Abdominal pain [R10.9]  Gallstone pancreatitis [K85.10]      Procedures Performed: 5/23/24 laparoscopic cholecystectomy with IOC by Dr Don Bethea    HPI: Per Izabela Waggoner PA-C H&P 5/21: \"Don Valdez is a 63 y.o. male with past medical history significant for restless leg syndrome who initially presented to [facility] ED with complaints of abdominal pain. Patient reports abdominal pain started Sunday. He reports an episode of nausea and vomiting Sunday as well. No further episodes. He reports he did not eat much on Monday and the pain worsened today, prompting his presentation to the ER. He reports that he had two cupcakes earlier this morning and tolerated with no nausea or vomiting. He reports he is having regular bowel movements. Denies history of biliary colic. Denies blood thinners. Denies recent procedures or new medications. Denies prior abdominal surgeries, but reports he has had surgery for a \"rectal fistula\" in 1990. Unable to provide more information on that. He states he has had a colonoscopy within the past two years, but unable to find information on that in the chart.\"     Hospital Course: Patient presented to the [facility] ED 5/21 with abdominal pain. CT showed evidence of pancreatitis. He also had an elevated lipase. There were gallstones noted on CT consistent with acute gallstone " pancreatitis. Patient was admitted to the general surgery service for IV resuscitation and laparoscopic cholecystectomy. Patient appears bloated on 5/22 so the decision was made for lap cass to be completed the following day. On 5/23 a laparoscopic cholecystectomy with IOC was performed. The IOC showed a dilated cystic duct and obstructed CBD at the ampulla. The patient's LFTs had remained wnl prior to the procedure. The patient was kept overnight for observation and to repeat labs in the morning. Patient's LFTs were wnl this morning. Patient has some abdominal soreness but feels well enough to go home. He is tolerating a regular and ambulating without assistance. He is to get repeat LFTs prior to his follow up visit. Patient is in agreement with the discharge and follow up plan.       Physical Exam  Constitutional:       Appearance: Normal appearance.   HENT:      Head: Normocephalic and atraumatic.      Nose: Nose normal.      Mouth/Throat:      Mouth: Mucous membranes are moist.      Pharynx: Oropharynx is clear.   Eyes:      Conjunctiva/sclera: Conjunctivae normal.      Pupils: Pupils are equal, round, and reactive to light.   Cardiovascular:      Rate and Rhythm: Normal rate and regular rhythm.   Pulmonary:      Effort: Pulmonary effort is normal.   Abdominal:      General: Abdomen is flat.      Palpations: Abdomen is soft.      Tenderness: There is no abdominal tenderness.   Musculoskeletal:         General: Normal range of motion.   Skin:     General: Skin is warm and dry.   Neurological:      General: No focal deficit present.      Mental Status: He is alert.   Psychiatric:         Mood and Affect: Mood normal.        Condition at Discharge: good     Discharge instructions/Information to patient and family:   See after visit summary for information provided to patient and family.      Provisions for Follow-Up Care:  See after visit summary for information related to follow-up care and any pertinent home  health orders.      Disposition: Home        Planned Readmission: No    Discharge Statement   I spent 20 minutes discharging the patient. This time was spent on the day of discharge. I had direct contact with the patient on the day of discharge. Additional documentation is required if more than 30 minutes were spent on discharge.     Discharge Medications:  See after visit summary for reconciled discharge medications provided to patient and family.      Zara Tom PA-C

## 2024-05-24 NOTE — PLAN OF CARE
Problem: PAIN - ADULT  Goal: Verbalizes/displays adequate comfort level or baseline comfort level  Description: Interventions:  - Encourage patient to monitor pain and request assistance  - Assess pain using appropriate pain scale  - Administer analgesics based on type and severity of pain and evaluate response  - Implement non-pharmacological measures as appropriate and evaluate response  - Consider cultural and social influences on pain and pain management  - Notify physician/advanced practitioner if interventions unsuccessful or patient reports new pain  Outcome: Progressing     Problem: INFECTION - ADULT  Goal: Absence or prevention of progression during hospitalization  Description: INTERVENTIONS:  - Assess and monitor for signs and symptoms of infection  - Monitor lab/diagnostic results  - Monitor all insertion sites, i.e. indwelling lines, tubes, and drains  - Monitor endotracheal if appropriate and nasal secretions for changes in amount and color  - [place] appropriate cooling/warming therapies per order  - Administer medications as ordered  - Instruct and encourage patient and family to use good hand hygiene technique  - Identify and instruct in appropriate isolation precautions for identified infection/condition  Outcome: Progressing     Problem: DISCHARGE PLANNING  Goal: Discharge to home or other facility with appropriate resources  Description: INTERVENTIONS:  - Identify barriers to discharge w/patient and caregiver  - Arrange for needed discharge resources and transportation as appropriate  - Identify discharge learning needs (meds, wound care, etc.)  - Arrange for interpretive services to assist at discharge as needed  - Refer to Case Management Department for coordinating discharge planning if the patient needs post-hospital services based on physician/advanced practitioner order or complex needs related to functional status, cognitive ability, or social support system  Outcome: Progressing      Problem: Knowledge Deficit  Goal: Patient/family/caregiver demonstrates understanding of disease process, treatment plan, medications, and discharge instructions  Description: Complete learning assessment and assess knowledge base.  Interventions:  - Provide teaching at level of understanding  - Provide teaching via preferred learning methods  Outcome: Progressing     Problem: GASTROINTESTINAL - ADULT  Goal: Minimal or absence of nausea and/or vomiting  Description: INTERVENTIONS:  - Administer IV fluids if ordered to ensure adequate hydration  - Maintain NPO status until nausea and vomiting are resolved  - Nasogastric tube if ordered  - Administer ordered antiemetic medications as needed  - Provide nonpharmacologic comfort measures as appropriate  - Advance diet as tolerated, if ordered  - Consider nutrition services referral to assist patient with adequate nutrition and appropriate food choices  Outcome: Progressing  Goal: Maintains or returns to baseline bowel function  Description: INTERVENTIONS:  - Assess bowel function  - Encourage oral fluids to ensure adequate hydration  - Administer IV fluids if ordered to ensure adequate hydration  - Administer ordered medications as needed  - Encourage mobilization and activity  - Consider nutritional services referral to assist patient with adequate nutrition and appropriate food choices  Outcome: Progressing

## 2024-05-24 NOTE — CASE MANAGEMENT
Case Management Assessment & Discharge Planning Note    Patient name Don Valdez  Location /-01 MRN [id number]  : 1960 Date 2024       Current Admission Date: 2024  Current Admission Diagnosis:Gallstone pancreatitis   Patient Active Problem List    Diagnosis Date Noted Date Diagnosed    Hyperlipidemia 2024     Calculus of gallbladder and bile duct with acute and chronic cholecystitis with obstruction 2024     Gallstone pancreatitis 2024     Bilateral carpal tunnel syndrome        LOS (days): 2  Geometric Mean LOS (GMLOS) (days): 2.4  Days to GMLOS:0.4     OBJECTIVE:    Risk of Unplanned Readmission Score: 6.92         Current admission status: Inpatient  Referral Reason: Other (Discharge planning)    Preferred Pharmacy:   [facility] [address]  [place] [address]  Phone: [phone] Fax: [phone]    Primary Care Provider: Arley Martinez MD    Primary Insurance: [employer]  Secondary Insurance:     ASSESSMENT:  Active Health Care Proxies       SHANA SERNA Health Care Representative - Sister   Primary Phone: [phone] (Home)                 Advance Directives  Does patient have a Health Care POA?: No  Was patient offered paperwork?: Yes  Does patient currently have a Health Care decision maker?: No  Does patient have Advance Directives?: No  Was patient offered paperwork?: Yes  Primary Contact: Shana Serna - Sister         Readmission Root Cause  30 Day Readmission: No    Patient Information  Admitted from:: Home  Mental Status: Alert  During Assessment patient was accompanied by: Sister  Assessment information provided by:: Patient, Sister  Primary Caregiver: Self  Support Systems: Family members  County of Residence: [facility] city do you live in?: [place]  Home entry access options. Select all that apply.: Stairs  Number of steps to enter home.: 3  Do the steps have  railings?: Yes  Type of Current Residence: 2 story home  Upon entering residence, is there a bedroom on the main floor (no further steps)?: No  A bedroom is located on the following floor levels of residence (select all that apply):: 2nd Floor  Upon entering residence, is there a bathroom on the main floor (no further steps)?: No  Indicate which floors of current residence have a bathroom (select all the apply):: 2nd Floor  Number of steps to 2nd floor from main floor: One Flight  Living Arrangements: Lives Alone  Is patient a ?: No    Activities of Daily Living Prior to Admission  Functional Status: Independent  Completes ADLs independently?: Yes  Ambulates independently?: Yes  Does patient use assisted devices?: No  Does patient currently own DME?: No  Does patient have a history of Outpatient Therapy (PT/OT)?: No  Does the patient have a history of Short-Term Rehab?: No  Does patient have a history of HHC?: No  Does patient currently have HHC?: No         Patient Information Continued  Income Source: Employed  Does patient have prescription coverage?: Yes  Does patient receive dialysis treatments?: No  Does patient have a history of substance abuse?: No  Does patient have a history of Mental Health Diagnosis?: No         Means of Transportation  Means of Transport to Appts:: Drives Self      Social Determinants of Health (SDOH)      Flowsheet Row Most Recent Value   Housing Stability    In the last 12 months, was there a time when you were not able to pay the mortgage or rent on time? N   In the past 12 months, how many times have you moved where you were living? 1   At any time in the past 12 months, were you homeless or living in a shelter (including now)? N   Transportation Needs    In the past 12 months, has lack of transportation kept you from medical appointments or from getting medications? no   In the past 12 months, has lack of transportation kept you from meetings, work, or from getting things  needed for daily living? No   Food Insecurity    Within the past 12 months, you worried that your food would run out before you got the money to buy more. Never true   Within the past 12 months, the food you bought just didn't last and you didn't have money to get more. Never true   Utilities    In the past 12 months has the electric, gas, oil, or water company threatened to shut off services in your home? No            DISCHARGE DETAILS:    Discharge planning discussed with:: Pt, sister  Freedom of Choice: Yes  Comments - Freedom of Choice: Pt completely independent with ADLs prior to admit. Pt denies any CM discharge needs. He will return home with his sister transporting.  Plan for d/c today.  CM contacted family/caregiver?: Yes  Were Treatment Team discharge recommendations reviewed with patient/caregiver?: Yes  Did patient/caregiver verbalize understanding of patient care needs?: Yes  Were patient/caregiver advised of the risks associated with not following Treatment Team discharge recommendations?: Yes    Contacts  Patient Contacts: Shana - sister  Relationship to Patient:: Family  Contact Method: In Person  Reason/Outcome: Discharge Planning    Requested Home Health Care         Is the patient interested in HHC at discharge?: No    DME Referral Provided  Referral made for DME?: No         Would you like to participate in our [facility] service program?  : No - Declined    Treatment Team Recommendation: Home  Discharge Destination Plan:: Home  Transport at Discharge : Family (Sister)

## 2024-05-26 ENCOUNTER — HOSPITAL ENCOUNTER (INPATIENT)
Facility: HOSPITAL | Age: 64
LOS: 5 days | Discharge: HOME | DRG: 862 | End: 2024-05-31
Attending: SURGERY | Admitting: SURGERY
Payer: COMMERCIAL

## 2024-05-26 ENCOUNTER — HOSPITAL ENCOUNTER (EMERGENCY)
Facility: HOSPITAL | Age: 64
End: 2024-05-26
Attending: EMERGENCY MEDICINE
Payer: COMMERCIAL

## 2024-05-26 ENCOUNTER — APPOINTMENT (EMERGENCY)
Dept: CT IMAGING | Facility: HOSPITAL | Age: 64
End: 2024-05-26
Payer: COMMERCIAL

## 2024-05-26 VITALS
HEIGHT: 68 IN | RESPIRATION RATE: 20 BRPM | OXYGEN SATURATION: 90 % | SYSTOLIC BLOOD PRESSURE: 144 MMHG | TEMPERATURE: 100.1 F | HEART RATE: 108 BPM | DIASTOLIC BLOOD PRESSURE: 73 MMHG | BODY MASS INDEX: 31.67 KG/M2 | WEIGHT: 209 LBS

## 2024-05-26 DIAGNOSIS — R78.81 GRAM-NEGATIVE BACTEREMIA: ICD-10-CM

## 2024-05-26 DIAGNOSIS — K83.09 CHOLANGITIS: Primary | ICD-10-CM

## 2024-05-26 DIAGNOSIS — K80.67 CALCULUS OF GALLBLADDER AND BILE DUCT WITH ACUTE AND CHRONIC CHOLECYSTITIS WITH OBSTRUCTION: Primary | ICD-10-CM

## 2024-05-26 DIAGNOSIS — K80.50 CHOLEDOCHOLITHIASIS: ICD-10-CM

## 2024-05-26 DIAGNOSIS — K83.09 CHOLANGITIS: ICD-10-CM

## 2024-05-26 DIAGNOSIS — K85.10 GALLSTONE PANCREATITIS: ICD-10-CM

## 2024-05-26 PROBLEM — Z90.49 S/P CHOLECYSTECTOMY: Status: ACTIVE | Noted: 2024-05-26

## 2024-05-26 LAB
2HR DELTA HS TROPONIN: 2 NG/L
4HR DELTA HS TROPONIN: 0 NG/L
ALBUMIN SERPL BCP-MCNC: 4.3 G/DL (ref 3.5–5)
ALP SERPL-CCNC: 141 U/L (ref 34–104)
ALT SERPL W P-5'-P-CCNC: 187 U/L (ref 7–52)
AMORPH PHOS CRY URNS QL MICRO: NORMAL /HPF
ANION GAP SERPL CALCULATED.3IONS-SCNC: 11 MMOL/L (ref 4–13)
APTT PPP: 30 SECONDS (ref 23–37)
AST SERPL W P-5'-P-CCNC: 233 U/L (ref 13–39)
ATRIAL RATE: 114 BPM
ATRIAL RATE: 115 BPM
ATRIAL RATE: 96 BPM
BACTERIA UR QL AUTO: NORMAL /HPF
BASOPHILS # BLD AUTO: 0.04 THOUSANDS/ÂΜL (ref 0–0.1)
BASOPHILS NFR BLD AUTO: 0 % (ref 0–1)
BILIRUB SERPL-MCNC: 2.46 MG/DL (ref 0.2–1)
BILIRUB UR QL STRIP: NEGATIVE
BNP SERPL-MCNC: 76 PG/ML (ref 0–100)
BUN SERPL-MCNC: 14 MG/DL (ref 5–25)
CALCIUM SERPL-MCNC: 9.6 MG/DL (ref 8.4–10.2)
CARDIAC TROPONIN I PNL SERPL HS: 6 NG/L
CARDIAC TROPONIN I PNL SERPL HS: 6 NG/L
CARDIAC TROPONIN I PNL SERPL HS: 8 NG/L
CHLORIDE SERPL-SCNC: 101 MMOL/L (ref 96–108)
CLARITY UR: CLEAR
CO2 SERPL-SCNC: 26 MMOL/L (ref 21–32)
COLOR UR: ABNORMAL
CREAT SERPL-MCNC: 1.06 MG/DL (ref 0.6–1.3)
EOSINOPHIL # BLD AUTO: 0.34 THOUSAND/ÂΜL (ref 0–0.61)
EOSINOPHIL NFR BLD AUTO: 3 % (ref 0–6)
ERYTHROCYTE [DISTWIDTH] IN BLOOD BY AUTOMATED COUNT: 13.8 % (ref 11.6–15.1)
FLUAV RNA RESP QL NAA+PROBE: NEGATIVE
FLUBV RNA RESP QL NAA+PROBE: NEGATIVE
GFR SERPL CREATININE-BSD FRML MDRD: 74 ML/MIN/1.73SQ M
GLUCOSE SERPL-MCNC: 162 MG/DL (ref 65–140)
GLUCOSE UR STRIP-MCNC: ABNORMAL MG/DL
HCT VFR BLD AUTO: 40.5 % (ref 36.5–49.3)
HGB BLD-MCNC: 13.5 G/DL (ref 12–17)
HGB UR QL STRIP.AUTO: NEGATIVE
IMM GRANULOCYTES # BLD AUTO: 0.19 THOUSAND/UL (ref 0–0.2)
IMM GRANULOCYTES NFR BLD AUTO: 2 % (ref 0–2)
INR PPP: 1.4 (ref 0.84–1.19)
KETONES UR STRIP-MCNC: NEGATIVE MG/DL
LACTATE SERPL-SCNC: 0.7 MMOL/L (ref 0.5–2)
LEUKOCYTE ESTERASE UR QL STRIP: NEGATIVE
LIPASE SERPL-CCNC: 133 U/L (ref 11–82)
LYMPHOCYTES # BLD AUTO: 1.96 THOUSANDS/ÂΜL (ref 0.6–4.47)
LYMPHOCYTES NFR BLD AUTO: 18 % (ref 14–44)
MCH RBC QN AUTO: 30.3 PG (ref 26.8–34.3)
MCHC RBC AUTO-ENTMCNC: 33.3 G/DL (ref 31.4–37.4)
MCV RBC AUTO: 91 FL (ref 82–98)
MONOCYTES # BLD AUTO: 0.46 THOUSAND/ÂΜL (ref 0.17–1.22)
MONOCYTES NFR BLD AUTO: 4 % (ref 4–12)
NEUTROPHILS # BLD AUTO: 8.01 THOUSANDS/ÂΜL (ref 1.85–7.62)
NEUTS SEG NFR BLD AUTO: 73 % (ref 43–75)
NITRITE UR QL STRIP: NEGATIVE
NON-SQ EPI CELLS URNS QL MICRO: NORMAL /HPF
NRBC BLD AUTO-RTO: 0 /100 WBCS
P AXIS: 35 DEGREES
P AXIS: 46 DEGREES
P AXIS: 55 DEGREES
PH UR STRIP.AUTO: 7.5 [PH]
PLATELET # BLD AUTO: 291 THOUSANDS/UL (ref 149–390)
PMV BLD AUTO: 9.9 FL (ref 8.9–12.7)
POTASSIUM SERPL-SCNC: 3.8 MMOL/L (ref 3.5–5.3)
PR INTERVAL: 100 MS
PR INTERVAL: 128 MS
PR INTERVAL: 136 MS
PROCALCITONIN SERPL-MCNC: 0.74 NG/ML
PROT SERPL-MCNC: 8.1 G/DL (ref 6.4–8.4)
PROT UR STRIP-MCNC: ABNORMAL MG/DL
PROTHROMBIN TIME: 17.3 SECONDS (ref 11.6–14.5)
QRS AXIS: 56 DEGREES
QRS AXIS: 59 DEGREES
QRS AXIS: 68 DEGREES
QRSD INTERVAL: 84 MS
QRSD INTERVAL: 88 MS
QRSD INTERVAL: 88 MS
QT INTERVAL: 308 MS
QT INTERVAL: 316 MS
QT INTERVAL: 348 MS
QTC INTERVAL: 426 MS
QTC INTERVAL: 435 MS
QTC INTERVAL: 439 MS
RBC # BLD AUTO: 4.45 MILLION/UL (ref 3.88–5.62)
RBC #/AREA URNS AUTO: NORMAL /HPF
RSV RNA RESP QL NAA+PROBE: NEGATIVE
SARS-COV-2 RNA RESP QL NAA+PROBE: NEGATIVE
SODIUM SERPL-SCNC: 138 MMOL/L (ref 135–147)
SP GR UR STRIP.AUTO: 1.02
T WAVE AXIS: 21 DEGREES
T WAVE AXIS: 44 DEGREES
T WAVE AXIS: 6 DEGREES
UROBILINOGEN UR QL STRIP.AUTO: 4 E.U./DL
VENTRICULAR RATE: 114 BPM
VENTRICULAR RATE: 115 BPM
VENTRICULAR RATE: 96 BPM
WBC # BLD AUTO: 11 THOUSAND/UL (ref 4.31–10.16)
WBC #/AREA URNS AUTO: NORMAL /HPF

## 2024-05-26 PROCEDURE — 96361 HYDRATE IV INFUSION ADD-ON: CPT

## 2024-05-26 PROCEDURE — 99223 1ST HOSP IP/OBS HIGH 75: CPT | Performed by: SURGERY

## 2024-05-26 PROCEDURE — 96375 TX/PRO/DX INJ NEW DRUG ADDON: CPT

## 2024-05-26 PROCEDURE — 71275 CT ANGIOGRAPHY CHEST: CPT

## 2024-05-26 PROCEDURE — 87040 BLOOD CULTURE FOR BACTERIA: CPT | Performed by: EMERGENCY MEDICINE

## 2024-05-26 PROCEDURE — 36415 COLL VENOUS BLD VENIPUNCTURE: CPT | Performed by: EMERGENCY MEDICINE

## 2024-05-26 PROCEDURE — 87040 BLOOD CULTURE FOR BACTERIA: CPT

## 2024-05-26 PROCEDURE — 99291 CRITICAL CARE FIRST HOUR: CPT | Performed by: STUDENT IN AN ORGANIZED HEALTH CARE EDUCATION/TRAINING PROGRAM

## 2024-05-26 PROCEDURE — C9113 INJ PANTOPRAZOLE SODIUM, VIA: HCPCS

## 2024-05-26 PROCEDURE — 87077 CULTURE AEROBIC IDENTIFY: CPT

## 2024-05-26 PROCEDURE — 83880 ASSAY OF NATRIURETIC PEPTIDE: CPT | Performed by: EMERGENCY MEDICINE

## 2024-05-26 PROCEDURE — 87077 CULTURE AEROBIC IDENTIFY: CPT | Performed by: EMERGENCY MEDICINE

## 2024-05-26 PROCEDURE — 87154 CUL TYP ID BLD PTHGN 6+ TRGT: CPT | Performed by: EMERGENCY MEDICINE

## 2024-05-26 PROCEDURE — 99285 EMERGENCY DEPT VISIT HI MDM: CPT

## 2024-05-26 PROCEDURE — 74177 CT ABD & PELVIS W/CONTRAST: CPT

## 2024-05-26 PROCEDURE — 83605 ASSAY OF LACTIC ACID: CPT | Performed by: EMERGENCY MEDICINE

## 2024-05-26 PROCEDURE — 84145 PROCALCITONIN (PCT): CPT | Performed by: EMERGENCY MEDICINE

## 2024-05-26 PROCEDURE — 99222 1ST HOSP IP/OBS MODERATE 55: CPT | Performed by: INTERNAL MEDICINE

## 2024-05-26 PROCEDURE — 83690 ASSAY OF LIPASE: CPT | Performed by: EMERGENCY MEDICINE

## 2024-05-26 PROCEDURE — 85610 PROTHROMBIN TIME: CPT | Performed by: EMERGENCY MEDICINE

## 2024-05-26 PROCEDURE — 93010 ELECTROCARDIOGRAM REPORT: CPT | Performed by: INTERNAL MEDICINE

## 2024-05-26 PROCEDURE — 96376 TX/PRO/DX INJ SAME DRUG ADON: CPT

## 2024-05-26 PROCEDURE — 80053 COMPREHEN METABOLIC PANEL: CPT | Performed by: EMERGENCY MEDICINE

## 2024-05-26 PROCEDURE — 94640 AIRWAY INHALATION TREATMENT: CPT

## 2024-05-26 PROCEDURE — 93005 ELECTROCARDIOGRAM TRACING: CPT

## 2024-05-26 PROCEDURE — 85730 THROMBOPLASTIN TIME PARTIAL: CPT | Performed by: EMERGENCY MEDICINE

## 2024-05-26 PROCEDURE — 84484 ASSAY OF TROPONIN QUANT: CPT | Performed by: EMERGENCY MEDICINE

## 2024-05-26 PROCEDURE — 87186 SC STD MICRODIL/AGAR DIL: CPT | Performed by: EMERGENCY MEDICINE

## 2024-05-26 PROCEDURE — 85025 COMPLETE CBC W/AUTO DIFF WBC: CPT | Performed by: EMERGENCY MEDICINE

## 2024-05-26 PROCEDURE — 96365 THER/PROPH/DIAG IV INF INIT: CPT

## 2024-05-26 PROCEDURE — 81001 URINALYSIS AUTO W/SCOPE: CPT | Performed by: EMERGENCY MEDICINE

## 2024-05-26 PROCEDURE — 0241U HB NFCT DS VIR RESP RNA 4 TRGT: CPT | Performed by: EMERGENCY MEDICINE

## 2024-05-26 RX ORDER — PANTOPRAZOLE SODIUM 40 MG/10ML
40 INJECTION, POWDER, LYOPHILIZED, FOR SOLUTION INTRAVENOUS
Status: DISCONTINUED | OUTPATIENT
Start: 2024-05-26 | End: 2024-05-28

## 2024-05-26 RX ORDER — HYDROMORPHONE HCL/PF 1 MG/ML
0.5 SYRINGE (ML) INJECTION
Status: DISCONTINUED | OUTPATIENT
Start: 2024-05-26 | End: 2024-05-28

## 2024-05-26 RX ORDER — HYDROMORPHONE HCL IN WATER/PF 6 MG/30 ML
0.2 PATIENT CONTROLLED ANALGESIA SYRINGE INTRAVENOUS
Status: DISCONTINUED | OUTPATIENT
Start: 2024-05-26 | End: 2024-05-26

## 2024-05-26 RX ORDER — HYDROMORPHONE HCL IN WATER/PF 6 MG/30 ML
0.2 PATIENT CONTROLLED ANALGESIA SYRINGE INTRAVENOUS ONCE
Status: COMPLETED | OUTPATIENT
Start: 2024-05-26 | End: 2024-05-26

## 2024-05-26 RX ORDER — ALBUTEROL SULFATE 2.5 MG/3ML
2.5 SOLUTION RESPIRATORY (INHALATION) ONCE
Status: COMPLETED | OUTPATIENT
Start: 2024-05-26 | End: 2024-05-26

## 2024-05-26 RX ORDER — HYDROMORPHONE HCL/PF 1 MG/ML
0.5 SYRINGE (ML) INJECTION ONCE
Status: COMPLETED | OUTPATIENT
Start: 2024-05-26 | End: 2024-05-26

## 2024-05-26 RX ORDER — LABETALOL HYDROCHLORIDE 5 MG/ML
10 INJECTION, SOLUTION INTRAVENOUS EVERY 6 HOURS PRN
Status: DISCONTINUED | OUTPATIENT
Start: 2024-05-26 | End: 2024-05-31 | Stop reason: HOSPADM

## 2024-05-26 RX ORDER — ONDANSETRON 2 MG/ML
4 INJECTION INTRAMUSCULAR; INTRAVENOUS EVERY 4 HOURS PRN
Status: DISCONTINUED | OUTPATIENT
Start: 2024-05-26 | End: 2024-05-31 | Stop reason: HOSPADM

## 2024-05-26 RX ORDER — ENOXAPARIN SODIUM 100 MG/ML
40 INJECTION SUBCUTANEOUS
Status: DISCONTINUED | OUTPATIENT
Start: 2024-05-27 | End: 2024-05-31 | Stop reason: HOSPADM

## 2024-05-26 RX ORDER — GABAPENTIN 300 MG/1
300 CAPSULE ORAL
Status: DISCONTINUED | OUTPATIENT
Start: 2024-05-26 | End: 2024-05-26

## 2024-05-26 RX ORDER — ACETAMINOPHEN 325 MG/1
975 TABLET ORAL EVERY 6 HOURS
Status: DISCONTINUED | OUTPATIENT
Start: 2024-05-26 | End: 2024-05-26

## 2024-05-26 RX ORDER — CHLORHEXIDINE GLUCONATE ORAL RINSE 1.2 MG/ML
15 SOLUTION DENTAL EVERY 12 HOURS SCHEDULED
Status: DISCONTINUED | OUTPATIENT
Start: 2024-05-26 | End: 2024-05-31 | Stop reason: HOSPADM

## 2024-05-26 RX ORDER — OXYCODONE HYDROCHLORIDE 5 MG/1
5 TABLET ORAL EVERY 4 HOURS PRN
Status: DISCONTINUED | OUTPATIENT
Start: 2024-05-26 | End: 2024-05-26

## 2024-05-26 RX ORDER — SODIUM CHLORIDE, SODIUM GLUCONATE, SODIUM ACETATE, POTASSIUM CHLORIDE, MAGNESIUM CHLORIDE, SODIUM PHOSPHATE, DIBASIC, AND POTASSIUM PHOSPHATE .53; .5; .37; .037; .03; .012; .00082 G/100ML; G/100ML; G/100ML; G/100ML; G/100ML; G/100ML; G/100ML
125 INJECTION, SOLUTION INTRAVENOUS CONTINUOUS
Status: DISCONTINUED | OUTPATIENT
Start: 2024-05-26 | End: 2024-05-28

## 2024-05-26 RX ORDER — HEPARIN SODIUM 5000 [USP'U]/ML
5000 INJECTION, SOLUTION INTRAVENOUS; SUBCUTANEOUS EVERY 8 HOURS SCHEDULED
Status: COMPLETED | OUTPATIENT
Start: 2024-05-26 | End: 2024-05-26

## 2024-05-26 RX ORDER — ACETAMINOPHEN 10 MG/ML
1000 INJECTION, SOLUTION INTRAVENOUS EVERY 6 HOURS SCHEDULED
Status: DISCONTINUED | OUTPATIENT
Start: 2024-05-26 | End: 2024-05-28

## 2024-05-26 RX ORDER — HYDROMORPHONE HCL IN WATER/PF 6 MG/30 ML
0.2 PATIENT CONTROLLED ANALGESIA SYRINGE INTRAVENOUS
Status: DISCONTINUED | OUTPATIENT
Start: 2024-05-26 | End: 2024-05-28

## 2024-05-26 RX ORDER — ONDANSETRON 2 MG/ML
4 INJECTION INTRAMUSCULAR; INTRAVENOUS ONCE
Status: COMPLETED | OUTPATIENT
Start: 2024-05-26 | End: 2024-05-26

## 2024-05-26 RX ORDER — IPRATROPIUM BROMIDE AND ALBUTEROL SULFATE 2.5; .5 MG/3ML; MG/3ML
3 SOLUTION RESPIRATORY (INHALATION)
Status: DISCONTINUED | OUTPATIENT
Start: 2024-05-26 | End: 2024-05-26

## 2024-05-26 RX ADMIN — ACETAMINOPHEN 1000 MG: 10 INJECTION INTRAVENOUS at 20:42

## 2024-05-26 RX ADMIN — ONDANSETRON 4 MG: 2 INJECTION INTRAMUSCULAR; INTRAVENOUS at 04:28

## 2024-05-26 RX ADMIN — PIPERACILLIN SODIUM AND TAZOBACTAM SODIUM 4.5 G: 36; 4.5 INJECTION, POWDER, LYOPHILIZED, FOR SOLUTION INTRAVENOUS at 09:48

## 2024-05-26 RX ADMIN — SODIUM CHLORIDE 1000 ML: 0.9 INJECTION, SOLUTION INTRAVENOUS at 06:10

## 2024-05-26 RX ADMIN — PANTOPRAZOLE SODIUM 40 MG: 40 INJECTION, POWDER, FOR SOLUTION INTRAVENOUS at 09:09

## 2024-05-26 RX ADMIN — SODIUM CHLORIDE, SODIUM GLUCONATE, SODIUM ACETATE, POTASSIUM CHLORIDE, MAGNESIUM CHLORIDE, SODIUM PHOSPHATE, DIBASIC, AND POTASSIUM PHOSPHATE 125 ML/HR: .53; .5; .37; .037; .03; .012; .00082 INJECTION, SOLUTION INTRAVENOUS at 17:57

## 2024-05-26 RX ADMIN — HYDROMORPHONE HYDROCHLORIDE 0.5 MG: 1 INJECTION, SOLUTION INTRAMUSCULAR; INTRAVENOUS; SUBCUTANEOUS at 03:41

## 2024-05-26 RX ADMIN — ALBUTEROL SULFATE 2.5 MG: 2.5 SOLUTION RESPIRATORY (INHALATION) at 05:10

## 2024-05-26 RX ADMIN — ONDANSETRON 4 MG: 2 INJECTION INTRAMUSCULAR; INTRAVENOUS at 09:08

## 2024-05-26 RX ADMIN — HEPARIN SODIUM 5000 UNITS: 5000 INJECTION INTRAVENOUS; SUBCUTANEOUS at 09:34

## 2024-05-26 RX ADMIN — HYDROMORPHONE HYDROCHLORIDE 0.2 MG: 0.2 INJECTION, SOLUTION INTRAMUSCULAR; INTRAVENOUS; SUBCUTANEOUS at 02:27

## 2024-05-26 RX ADMIN — ACETAMINOPHEN 975 MG: 325 TABLET, FILM COATED ORAL at 09:33

## 2024-05-26 RX ADMIN — PIPERACILLIN SODIUM AND TAZOBACTAM SODIUM 4.5 G: 36; 4.5 INJECTION, POWDER, LYOPHILIZED, FOR SOLUTION INTRAVENOUS at 13:44

## 2024-05-26 RX ADMIN — PIPERACILLIN AND TAZOBACTAM 4.5 G: 4; .5 INJECTION, POWDER, LYOPHILIZED, FOR SOLUTION INTRAVENOUS at 05:08

## 2024-05-26 RX ADMIN — HYDROMORPHONE HYDROCHLORIDE 0.2 MG: 0.2 INJECTION, SOLUTION INTRAMUSCULAR; INTRAVENOUS; SUBCUTANEOUS at 09:09

## 2024-05-26 RX ADMIN — CHLORHEXIDINE GLUCONATE 15 ML: 1.2 SOLUTION ORAL at 20:42

## 2024-05-26 RX ADMIN — IOHEXOL 100 ML: 350 INJECTION, SOLUTION INTRAVENOUS at 02:49

## 2024-05-26 RX ADMIN — IPRATROPIUM BROMIDE AND ALBUTEROL SULFATE 3 ML: 2.5; .5 SOLUTION RESPIRATORY (INHALATION) at 03:16

## 2024-05-26 RX ADMIN — SODIUM CHLORIDE 1000 ML: 0.9 INJECTION, SOLUTION INTRAVENOUS at 04:34

## 2024-05-26 RX ADMIN — PIPERACILLIN SODIUM AND TAZOBACTAM SODIUM 4.5 G: 36; 4.5 INJECTION, POWDER, LYOPHILIZED, FOR SOLUTION INTRAVENOUS at 20:42

## 2024-05-26 RX ADMIN — HEPARIN SODIUM 5000 UNITS: 5000 INJECTION INTRAVENOUS; SUBCUTANEOUS at 20:42

## 2024-05-26 RX ADMIN — HEPARIN SODIUM 5000 UNITS: 5000 INJECTION INTRAVENOUS; SUBCUTANEOUS at 13:45

## 2024-05-26 RX ADMIN — SODIUM CHLORIDE, SODIUM GLUCONATE, SODIUM ACETATE, POTASSIUM CHLORIDE, MAGNESIUM CHLORIDE, SODIUM PHOSPHATE, DIBASIC, AND POTASSIUM PHOSPHATE 125 ML/HR: .53; .5; .37; .037; .03; .012; .00082 INJECTION, SOLUTION INTRAVENOUS at 09:10

## 2024-05-26 RX ADMIN — HYDROMORPHONE HYDROCHLORIDE 0.5 MG: 1 INJECTION, SOLUTION INTRAMUSCULAR; INTRAVENOUS; SUBCUTANEOUS at 07:01

## 2024-05-26 RX ADMIN — SODIUM CHLORIDE 1000 ML: 0.9 INJECTION, SOLUTION INTRAVENOUS at 05:13

## 2024-05-26 RX ADMIN — SODIUM CHLORIDE 1000 ML: 0.9 INJECTION, SOLUTION INTRAVENOUS at 04:43

## 2024-05-26 NOTE — CONSULTS
[facility]  Consult: Critical Care  Name: Don Valdez 63 y.o. male I MRN: [id number]  Unit/Bed#: [facility] 513-01 I Date of Admission: 5/26/2024   Date of Service: 5/26/2024 I Hospital Day: 0      Consults  Assessment & Plan   Neuro:   Analgesia  Scheduled medications  Tylenol IV 1g Q6  Prn medications  Dilaudid 0.2mg/5mg Q3 prn  Daily CAM-ICU, delirium precautions. Regulate sleep/wake cycle.     CV:   HTN  No reported history, hypertensive upon admission. Prn labetalol for SBP > 160.      Pulm:  Acute hypoxic respiratory insufficiency  Requiring 6L NC. Concern for aspiration following episode of emesis. Wean nasal cannula as tolerated for goal SpO2 > 92%. Encourage IS, pulmonary toilet.     GI:   Gallstone pancreatitis  S/p cholecystectomy (5/23) c/b retained stone in CBD.   Appreciate general surgery and GI recommendations  Serial abdominal exams  Trend daily LFTs  Pending ERCP  Maintain NPO, antibiotics as below.     :   No acute issues. Trend strict I/Os    F/E/N:   Isolyte at 125cc/hr  Replete electrolytes as needed for goal Mag > 2.0, Phos >3.0, K >4.0  NPO    Heme/Onc:   DVT ppx  Lovenox    Endo:   No active issues    ID:   Possible cholangitis  Cultures pending. Continue zosyn. Trend fever curve/white count    MSK/Skin:   PT/OT when appropriate  Local wound care to laparoscopy incisions    Disposition: Critical care     History of Present Illness     HPI: Don Valdez is a 63 y.o. M w/ s/p recent cholecystectomy (5/23/24) who presented to [facility] emergency department with acute onset of abdominal pain. Patient underwent lap cass with intra-operative cholangiogram revealing retained stone in CBD. He was discharged home on 5/24, but began experiencing abdominal pain and nausea last evening prompting ER evaluation. He was tachycardic and febrile and had an episode of vomiting followed by acute onset of hypoxia requiring 6L NC.     History obtained from  chart review and the patient.  Review of Systems   Gastrointestinal:  Positive for abdominal distention, abdominal pain, nausea and vomiting.     Historical Information   Past Medical History:  No date: GERD (gastroesophageal reflux disease)  No date: High cholesterol  No date: Restless leg syndrome Past Surgical History:  No date: CARPAL TUNNEL RELEASE; Bilateral  5/23/2024: CHOLECYSTECTOMY LAPAROSCOPIC; N/A      Comment:  Procedure: CHOLECYSTECTOMY LAPAROSCOPIC W/ INTRAOP                CHOLANGIOGRAM;  Surgeon: Don Bethea MD;  Location:               [facility];  Service: General  No date: ORIF FINGER / THUMB FRACTURE   Current Outpatient Medications   Medication Instructions    gabapentin (NEURONTIN) 300 mg capsule No dose, route, or frequency recorded.    oxyCODONE (ROXICODONE) 5 mg, Oral, Every 4 hours PRN    Allergies   Allergen Reactions    Tetracycline       Social History     Tobacco Use    Smoking status: Never    Smokeless tobacco: Never   Vaping Use    Vaping status: Never Used   Substance Use Topics    Alcohol use: Not Currently    Drug use: Not Currently    History reviewed. No pertinent family history.       Objective                            Vitals I/O      Most Recent Min/Max in 24hrs   Temp 98.8 °F (37.1 °C) Temp  Min: 98.8 °F (37.1 °C)  Max: 101.9 °F (38.8 °C)   Pulse 95 Pulse  Min: 94  Max: 123   Resp 17 Resp  Min: 17  Max: 47   /61 BP  Min: 110/61  Max: 199/97   O2 Sat 95 % SpO2  Min: 87 %  Max: 96 %      Intake/Output Summary (Last 24 hours) at 5/26/2024 1456  Last data filed at 5/26/2024 1400  Gross per 24 hour   Intake 704.17 ml   Output 0 ml   Net 704.17 ml       Diet NPO    Invasive Monitoring           Physical Exam   Physical Exam  Vitals and nursing note reviewed.   Eyes:      Pupils: Pupils are equal, round, and reactive to light.   Skin:     General: Skin is warm and dry.   HENT:      Head: Normocephalic and atraumatic.   Cardiovascular:      Rate and Rhythm: Regular  rhythm. Tachycardia present.      Pulses:           Radial pulses are 2+ on the right side and 2+ on the left side.        Dorsalis pedis pulses are 2+ on the right side and 2+ on the left side.      Heart sounds: Normal heart sounds.   Musculoskeletal:      Right lower leg: Edema present.      Left lower leg: Edema present.   Abdominal: General: There is distension.     Palpations: Abdomen is soft.      Tenderness: There is abdominal tenderness in the epigastric area.   Constitutional:       General: He is not in acute distress.     Appearance: He is well-developed and well-nourished. He is not ill-appearing.      Interventions: Nasal cannula in place.   Pulmonary:      Effort: Tachypnea present.      Breath sounds: Normal breath sounds. No decreased breath sounds, wheezing or rhonchi.   Psychiatric:         Behavior: Behavior is cooperative.   Neurological:      General: No focal deficit present.      Mental Status: He is alert and oriented to person, place, and time.      GCS: GCS eye subscore is 4. GCS verbal subscore is 5. GCS motor subscore is 6.            Diagnostic Studies        Imaging: CT A/P: 1.  Status post cholecystectomy. Mild fat stranding/fluid within the cholecystectomy bed likely postoperative.  2.  Persistent fat stranding in the peripancreatic and right upper quadrant which may be due to pancreatitis.  3.  Mild thickening of the duodenum and hepatic flexure which may be due to reactive duodenitis/colitis.  4.  Colonic diverticulosis without evidence of diverticulitis. I have personally reviewed pertinent reports.       Medications:  Scheduled PRN   acetaminophen, 1,000 mg, Q6H KATHY  chlorhexidine, 15 mL, Q12H KATHY  [START ON 5/27/2024] enoxaparin, 40 mg, Q24H KATHY  heparin (porcine), 5,000 Units, Q8H KATHY  pantoprazole, 40 mg, Q24H KATHY  piperacillin-tazobactam, 4.5 g, Q8H      HYDROmorphone, 0.5 mg, Q3H PRN  HYDROmorphone, 0.2 mg, Q3H PRN  labetalol, 10 mg, Q6H PRN  ondansetron, 4 mg, Q4H PRN        Continuous    multi-electrolyte, 125 mL/hr, Last Rate: 125 mL/hr (05/26/24 0910)         Labs:    CBC    Recent Labs     05/26/24  0210   WBC 11.00*   HGB 13.5   HCT 40.5        BMP    Recent Labs     05/26/24  0210   SODIUM 138   K 3.8      CO2 26   AGAP 11   BUN 14   CREATININE 1.06   CALCIUM 9.6       Coags    Recent Labs     05/26/24  0501   INR 1.40*   PTT 30        Additional Electrolytes  No recent results       Blood Gas    No recent results  No recent results LFTs  Recent Labs     05/26/24  0210   *   *   ALKPHOS 141*   ALB 4.3   TBILI 2.46*       Infectious  Recent Labs     05/26/24  0501   PROCALCITONI 0.74*     Glucose  Recent Labs     05/26/24  0210   GLUC 162*                Saritha Lujan PA-C

## 2024-05-26 NOTE — EMTALA/ACUTE CARE TRANSFER
[facility] EMERGENCY DEPARTMENT  [address] [place] [address]  Dept: [phone]      EMTALA TRANSFER CONSENT    NAME Don MCKAY 1960                              MRN [id number]    I have been informed of my rights regarding examination, treatment, and transfer   by Dr. Rich Gillis MD    Benefits:      Risks:        Consent for Transfer:  I acknowledge that my medical condition has been evaluated and explained to me by the emergency department physician or other qualified medical person and/or my attending physician, who has recommended that I be transferred to the service of    at  . The above potential benefits of such transfer, the potential risks associated with such transfer, and the probable risks of not being transferred have been explained to me, and I fully understand them.  The doctor has explained that, in my case, the benefits of transfer outweigh the risks.  I agree to be transferred.    I authorize the performance of emergency medical procedures and treatments upon me in both transit and upon arrival at the receiving facility.  Additionally, I authorize the release of any and all medical records to the receiving facility and request they be transported with me, if possible.  I understand that the safest mode of transportation during a medical emergency is an ambulance and that the Hospital advocates the use of this mode of transport. Risks of traveling to the receiving facility by car, including absence of medical control, life sustaining equipment, such as oxygen, and medical personnel has been explained to me and I fully understand them.    (LALO CORRECT BOX BELOW)  [  ]  I consent to the stated transfer and to be transported by ambulance/helicopter.  [  ]  I consent to the stated transfer, but refuse transportation by ambulance and accept full responsibility for my transportation by car.  I understand the risks  of non-ambulance transfers and I exonerate the Hospital and its staff from any deterioration in my condition that results from this refusal.    X___________________________________________    DATE  24  TIME________  Signature of patient or legally responsible individual signing on patient behalf           RELATIONSHIP TO PATIENT_________________________          Provider Certification    NAME Don Valdez                                         1960                              MRN [id number]    A medical screening exam was performed on the above named patient.  Based on the examination:    Condition Necessitating Transfer The encounter diagnosis was Cholangitis.    Patient Condition:      Reason for Transfer:      Transfer Requirements: Facility     Space available and qualified personnel available for treatment as acknowledged by    Agreed to accept transfer and to provide appropriate medical treatment as acknowledged by          Appropriate medical records of the examination and treatment of the patient are provided at the time of transfer   STAFF INITIAL WHEN COMPLETED _______  Transfer will be performed by qualified personnel from    and appropriate transfer equipment as required, including the use of necessary and appropriate life support measures.    Provider Certification: I have examined the patient and explained the following risks and benefits of being transferred/refusing transfer to the patient/family:         Based on these reasonable risks and benefits to the patient and/or the unborn child(stuart), and based upon the information available at the time of the patient’s examination, I certify that the medical benefits reasonably to be expected from the provision of appropriate medical treatments at another medical facility outweigh the increasing risks, if any, to the individual’s medical condition, and in the case of labor to the unborn child, from effecting the  transfer.    X____________________________________________ DATE 05/26/24        TIME_______      ORIGINAL - SEND TO MEDICAL RECORDS   COPY - SEND WITH PATIENT DURING TRANSFER

## 2024-05-26 NOTE — ED CARE HANDOFF
Emergency Department Sign Out Note        Sign out and transfer of care from Dr. Gillis. See Separate Emergency Department note.     The patient, Don Valdez, was evaluated by the previous provider for hypoxia as well as cholangitis..    Workup Completed:  And has history of gallstone pancreatitis about 5 days ago and now has signs of biliary colic /acute cholangitis.    ED Course / Workup Pending (followup):  Patient tachycardic, with a new O2 requirement.  Patient pending transfer to [place] this morning.                                     Procedures  Medical Decision Making  Amount and/or Complexity of Data Reviewed  Labs: ordered.  Radiology: ordered.    Risk  Prescription drug management.            Disposition  Final diagnoses:   Cholangitis     Time reflects when diagnosis was documented in both MDM as applicable and the Disposition within this note       Time User Action Codes Description Comment    5/26/2024  4:52 AM Rich Gillis Add [K83.09] Cholangitis           ED Disposition       ED Disposition   Transfer to Another Facility-In Network    Condition   --    Date/Time   Sun May 26, 2024  4:52 AM    Comment   Don Valdez should be transferred out to [place].               MD Documentation      Flowsheet Row Most Recent Value   Accepting Physician Dr. Lafleur   Accepting Facility Name, [facility]   Sending MD Dr. Gillis          RN Documentation      Flowsheet Row Most Recent Value   Accepting Facility Name, [facility]   Report Given to Lizzeth BONE          Follow-up Information    None       Discharge Medication List as of 5/26/2024  7:31 AM        CONTINUE these medications which have NOT CHANGED    Details   gabapentin (NEURONTIN) 300 mg capsule Starting Fri 8/18/2023, Historical Med      oxyCODONE (ROXICODONE) 5 immediate release tablet Take 1 tablet (5 mg total) by mouth every 4 (four) hours as needed for moderate pain for up to 10 days Max  Daily Amount: 30 mg, Starting Fri 5/24/2024, Until Mon 6/3/2024 at 2359, Normal      pantoprazole (Protonix) 20 mg tablet Take 20 mg by mouth daily, Historical Med      pravastatin (PRAVACHOL) 10 mg tablet Take 10 mg by mouth daily, Historical Med           No discharge procedures on file.       ED Provider  Electronically Signed by     Tommy Correa Jr.,   05/26/24 2487

## 2024-05-26 NOTE — CONSULTS
Consultation -  [facility]  Don Valdez 63 y.o. male MRN: [id number]  Unit/Bed#: ED 05 Encounter: [id number]            Inpatient consult to gastroenterology     Date/Time  5/26/2024 8:26 AM     Performed by  Grace Baxter DO   Authorized by  Luly Murphy MD           Reason for Consult / Principal Problem: Gallstone Pancreatitis      ASSESSMENT AND PLAN:      Mr. Kevin Valdez is a 63-year-old male with a history of restless leg syndrome and recent admission for gallstone pancreatitis status post laparoscopic cholecystectomy and positive IOC on 5/23 who now presents with recurrent epigastric pain, fevers, and elevated liver chemistries for which GI is now consulted.    Gallstone Pancreatitis  S/P Recent Cholecystectomy with Positive IOC  SIRS Syndrome; Possible Cholangitis   Elevated Liver Chemistries     Patient recently admitted to [facility] from 5/21 to 5/24 after presenting with epigastric pain and labs and CT imaging demonstrating pancreatitis.  Liver chemistries normal at that time with no CBD dilation on imaging but pancreatitis thought to be gallstone induced.  Therefore, patient did undergo laparoscopic cholecystectomy and intraoperative cholangiogram on 5/23 which did reveal obstructed CBD distally at the ampulla.  However patient did well postprocedure and LFTs remain normal allowing for his discharge home on 5/24.  Patient now presenting with more severe epigastric pain.  This is now accompanied by fever with temperature max of 101.7 and elevated LFTs.  On admission, AST//187, alkaline phosphatase 141, and total bilirubin 2.46.  Repeat abdominal imaging does show continued inflammation in the pancreas along with reactive inflammation in the duodenum and colon around the hepatic flexure.  No CBD dilation noted.  Overall, clinical presentation concerning for potential cholangitis given fever, elevated liver chemistries, and previously positive IOC.  Patient to be  started on antibiotics and closely monitored.  Given pancreatitis and signs of reactive inflammation, would allow time for inflammation to improve prior to proceeding with ERCP as procedure itself could worsen current inflammation.  However if patient experiences further clinical decompensation despite antibiotic therapy; would require more urgent ERCP intervention.    Plan:  Recommend continuation of IV Zosyn; follow WBC and trend fever curve; follow up blood cultures  Tentative plan plan for ERCP in the next 24 to 48 hours following improvement in pancreatitis  Monitor LFTs to ensure continued improvement  Continue to remain NPO, NG tube per surgical team, continue on IV fluids at 125 cc/hr  Encourage use of incentive spirometer  Closely monitor mental status and hemodynamics, avoid episodes of hypotension  Alert GI team on call of any change in clinical status warranting more emergent ERCP  Additional pain and symptom management per primary team  ______________________________________________________________________    HPI:  Mr. Kevin Valdez is a 63-year-old male with a past medical history of restless leg syndrome who initially presented to [facility] ED on 5/21 with complaints of abdominal pain accompanied by nausea/vomiting.  Labs on initial presentation with leukocytosis of 16.63.  Lipase elevated to 682 but LFTs largely within normal limits.  AST/ALT 31/58, alkaline phosphatase 75, and total bilirubin 0.78.  CT abdomen/pelvis did reveal mild peripancreatic fat haziness suggestive of potential pancreatitis.  Patient with cholelithiasis with no evidence of acute cholecystitis and no biliary dilation.  Patient was evaluated by the surgical team and underwent laparoscopic cholecystectomy on 5/23 with intraoperative cholangiogram demonstrating a dilated cystic duct and an obstructed common bile duct distally at the ampulla.  Post procedure, patient was observed overnight and LFTs continued to remain within  normal limits.  Therefore, patient was discharged home on 5/24.    Patient then represented to [facility] on 5/26 with recurrent, severe epigastric abdominal pain.  At time of initial ED arrival, patient had a temperature of 100.1.  Labs revealed mildly elevated WBC of 11 and acute elevation in liver chemistries.  AST//187, alkaline phosphatase 141, and total bilirubin 2.46.  Lipase 133.  Repeat CTA chest/abdomen/pelvis was completed and showed mild fat stranding/fluid within the cholecystectomy bed and persistent fat stranding in the peripancreatic and right upper quadrant region.  Patient also with mild thickening of the duodenum and hepatic flexure, potentially due to reactive duodenitis/colitis.  Once again, biliary tree unremarkable with no evidence of dilation.  Patient was then transferred to [facility] for evaluation by surgical teams and gastroenterology for consideration of ERCP.  At time of [facility] arrival, patient febrile with a temperature of 101.9 and tachycardic with heart rates of 100-1 20s.  Patient normotensive.  Patient also had episodes of vomiting and tachypnea with hypoxia requiring O2 supplementation.    Patient seen and examined at bedside.  Patient lying in bed comfortably in no acute distress or visible discomfort.  Does admit to improvement in abdominal pain since ED arrival.  Denies any current nausea/vomiting.  However, does admit to a cough and increased pain with coughing.  Offers no additional complaints at this time.    REVIEW OF SYSTEMS:    CONSTITUTIONAL: Denies any fever, chills, rigors, and weight loss.  HEENT: No earache or tinnitus. Denies hearing loss or visual disturbances.  CARDIOVASCULAR: No chest pain or palpitations.   RESPIRATORY: Denies any cough, hemoptysis, shortness of breath or dyspnea on exertion.  GASTROINTESTINAL: As noted in the History of Present Illness.   GENITOURINARY: No problems with urination. Denies any hematuria or dysuria.  NEUROLOGIC: No dizziness or  vertigo, denies headaches.   MUSCULOSKELETAL: Denies any muscle or joint pain.   SKIN: Denies skin rashes or itching.   ENDOCRINE: Denies excessive thirst. Denies intolerance to heat or cold.  PSYCHOSOCIAL: Denies depression or anxiety. Denies any recent memory loss.       Historical Information   Past Medical History:   Diagnosis Date    GERD (gastroesophageal reflux disease)     High cholesterol     Restless leg syndrome      Past Surgical History:   Procedure Laterality Date    CARPAL TUNNEL RELEASE Bilateral     CHOLECYSTECTOMY LAPAROSCOPIC N/A 5/23/2024    Procedure: CHOLECYSTECTOMY LAPAROSCOPIC W/ INTRAOP CHOLANGIOGRAM;  Surgeon: Don Bethea MD;  Location: [facility];  Service: General    ORIF FINGER / THUMB FRACTURE       Social History   Social History     Substance and Sexual Activity   Alcohol Use Not Currently     Social History     Substance and Sexual Activity   Drug Use Not Currently     Social History     Tobacco Use   Smoking Status Never   Smokeless Tobacco Never     History reviewed. No pertinent family history.    Meds/Allergies     Not in a hospital admission.  Current Facility-Administered Medications   Medication Dose Route Frequency    acetaminophen (TYLENOL) tablet 975 mg  975 mg Oral Q6H    gabapentin (NEURONTIN) capsule 300 mg  300 mg Oral HS    heparin (porcine) subcutaneous injection 5,000 Units  5,000 Units Subcutaneous Q8H KATHY    HYDROmorphone HCl (DILAUDID) injection 0.2 mg  0.2 mg Intravenous Q3H PRN    labetalol (NORMODYNE) injection 10 mg  10 mg Intravenous Q6H PRN    multi-electrolyte (PLASMALYTE-A/ISOLYTE-S PH 7.4) IV solution  125 mL/hr Intravenous Continuous    ondansetron (ZOFRAN) injection 4 mg  4 mg Intravenous Q4H PRN    oxyCODONE (ROXICODONE) IR tablet 5 mg  5 mg Oral Q4H PRN    oxyCODONE (ROXICODONE) split tablet 2.5 mg  2.5 mg Oral Q4H PRN    pantoprazole (PROTONIX) injection 40 mg  40 mg Intravenous Q24H KATHY    piperacillin-tazobactam (ZOSYN) 4.5 g in sodium  chloride 0.9 % 100 mL IV LOADING DOSE  4.5 g Intravenous Once    Followed by    piperacillin-tazobactam (ZOSYN) 4.5 g in sodium chloride 0.9 % 100 mL IVPB (EXTENDED INFUSION)  4.5 g Intravenous Q8H       Allergies   Allergen Reactions    Tetracycline            Objective     Blood pressure 145/69, pulse (!) 108, temperature (!) 101.9 °F (38.8 °C), temperature source Oral, resp. rate (!) 27, SpO2 92%. There is no height or weight on file to calculate BMI.    No intake or output data in the 24 hours ending 05/26/24 0953      PHYSICAL EXAM:      General Appearance:   Alert, cooperative, no distress   HEENT:   Normocephalic, atraumatic, anicteric.     Neck:  Supple, symmetrical, trachea midline   Lungs:   Clear to auscultation bilaterally; no rales, rhonchi or wheezing; respirations unlabored    Heart::   Regular rate and rhythm; no murmur, rub, or gallop.   Abdomen:   Soft, non-tender, non-distended; normal bowel sounds; no masses, no organomegaly    Genitalia:   Deferred    Rectal:   Deferred    Extremities:  No cyanosis, clubbing or edema    Pulses:  2+ and symmetric all extremities    Skin:  No jaundice, rashes, or lesions    Lymph nodes:  No palpable cervical lymphadenopathy        Lab Results:   Admission on 05/26/2024, Discharged on 05/26/2024   Component Date Value    WBC 05/26/2024 11.00 (H)     RBC 05/26/2024 4.45     Hemoglobin 05/26/2024 13.5     Hematocrit 05/26/2024 40.5     MCV 05/26/2024 91     MCH 05/26/2024 30.3     MCHC 05/26/2024 33.3     RDW 05/26/2024 13.8     MPV 05/26/2024 9.9     Platelets 05/26/2024 291     nRBC 05/26/2024 0     Segmented % 05/26/2024 73     Immature Grans % 05/26/2024 2     Lymphocytes % 05/26/2024 18     Monocytes % 05/26/2024 4     Eosinophils Relative 05/26/2024 3     Basophils Relative 05/26/2024 0     Absolute Neutrophils 05/26/2024 8.01 (H)     Absolute Immature Grans 05/26/2024 0.19     Absolute Lymphocytes 05/26/2024 1.96     Absolute Monocytes 05/26/2024 0.46      Eosinophils Absolute 05/26/2024 0.34     Basophils Absolute 05/26/2024 0.04     Sodium 05/26/2024 138     Potassium 05/26/2024 3.8     Chloride 05/26/2024 101     CO2 05/26/2024 26     ANION GAP 05/26/2024 11     BUN 05/26/2024 14     Creatinine 05/26/2024 1.06     Glucose 05/26/2024 162 (H)     Calcium 05/26/2024 9.6     AST 05/26/2024 233 (H)     ALT 05/26/2024 187 (H)     Alkaline Phosphatase 05/26/2024 141 (H)     Total Protein 05/26/2024 8.1     Albumin 05/26/2024 4.3     Total Bilirubin 05/26/2024 2.46 (H)     eGFR 05/26/2024 74     Lipase 05/26/2024 133 (H)     Color, UA 05/26/2024 Nataly (A)     Clarity, UA 05/26/2024 Clear     Specific Gravity, UA 05/26/2024 1.020     pH, UA 05/26/2024 7.5     Leukocytes, UA 05/26/2024 Negative     Nitrite, UA 05/26/2024 Negative     Protein, UA 05/26/2024 Trace (A)     Glucose, UA 05/26/2024 Trace (A)     Ketones, UA 05/26/2024 Negative     Urobilinogen, UA 05/26/2024 4.0 (A)     Bilirubin, UA 05/26/2024 Negative     Occult Blood, UA 05/26/2024 Negative     Ventricular Rate 05/26/2024 96     Atrial Rate 05/26/2024 96     MA Interval 05/26/2024 128     QRSD Interval 05/26/2024 88     QT Interval 05/26/2024 348     QTC Interval 05/26/2024 439     P Axis 05/26/2024 55     QRS Axis 05/26/2024 68     T Wave [place] 05/26/2024 44     hs TnI 0hr 05/26/2024 6     BNP 05/26/2024 76     RBC, UA 05/26/2024 1-2     WBC, UA 05/26/2024 0-1     Epithelial Cells 05/26/2024 None Seen     Bacteria, UA 05/26/2024 Occasional     AMORPH PHOSPATES 05/26/2024 Occasional     hs TnI 2hr 05/26/2024 8     Delta 2hr hsTnI 05/26/2024 2     hs TnI 4hr 05/26/2024 6     Delta 4hr hsTnI 05/26/2024 0     Ventricular Rate 05/26/2024 115     Atrial Rate 05/26/2024 115     MA Interval 05/26/2024 100     QRSD Interval 05/26/2024 88     QT Interval 05/26/2024 308     QTC Interval 05/26/2024 426     P Axis 05/26/2024 35     QRS Axis 05/26/2024 56     T Wave Axis 05/26/2024 6     LACTIC ACID 05/26/2024 0.7      Procalcitonin 05/26/2024 0.74 (H)     Protime 05/26/2024 17.3 (H)     INR 05/26/2024 1.40 (H)     PTT 05/26/2024 30     SARS-CoV-2 05/26/2024 Negative     INFLUENZA A PCR 05/26/2024 Negative     INFLUENZA B PCR 05/26/2024 Negative     RSV PCR 05/26/2024 Negative     Ventricular Rate 05/26/2024 114     Atrial Rate 05/26/2024 114     AZ Interval 05/26/2024 136     QRSD Interval 05/26/2024 84     QT Interval 05/26/2024 316     QTC Interval 05/26/2024 435     P Axis 05/26/2024 46     QRS Axis 05/26/2024 59     T Wave [place] 05/26/2024 21        Imaging Studies: I have personally reviewed pertinent imaging studies.

## 2024-05-26 NOTE — ED PROVIDER NOTES
History  Chief Complaint   Patient presents with    Abdominal Pain     Pt had lapchole on Thursday. Last night after eating supper started with mid-upper abd pain     When patient was seen last week he was found to have gallstone pancreatitis.  He says there were no postop complications to this evening.  He notes he had a bowel movement yesterday which he thinks was normal was a regular size.  He is continuing to pass gas.  Patient also notes that he is having dyspnea which is severe.  No family history of blood clots no personal history of blood clots.  He denies swelling in either extremity.  He notes that he was previously a smoker but has been for many years.  No history of COPD or asthma.        Prior to Admission Medications   Prescriptions Last Dose Informant Patient Reported? Taking?   gabapentin (NEURONTIN) 300 mg capsule 5/26/2024  Yes Yes   oxyCODONE (ROXICODONE) 5 immediate release tablet 5/26/2024  No Yes   Sig: Take 1 tablet (5 mg total) by mouth every 4 (four) hours as needed for moderate pain for up to 10 days Max Daily Amount: 30 mg      Facility-Administered Medications: None       Past Medical History:   Diagnosis Date    GERD (gastroesophageal reflux disease)     High cholesterol     Restless leg syndrome        Past Surgical History:   Procedure Laterality Date    CARPAL TUNNEL RELEASE Bilateral     CHOLECYSTECTOMY LAPAROSCOPIC N/A 5/23/2024    Procedure: CHOLECYSTECTOMY LAPAROSCOPIC W/ INTRAOP CHOLANGIOGRAM;  Surgeon: Don Bethea MD;  Location: [facility];  Service: General    ORIF FINGER / THUMB FRACTURE         History reviewed. No pertinent family history.  I have reviewed and agree with the history as documented.    E-Cigarette/Vaping    E-Cigarette Use Never User      E-Cigarette/Vaping Substances    Nicotine No     THC No     CBD No     Flavoring No     Other No     Unknown No      Social History     Tobacco Use    Smoking status: Never    Smokeless tobacco: Never   Vaping Use     Vaping status: Never Used   Substance Use Topics    Alcohol use: Not Currently    Drug use: Not Currently       Review of Systems   Constitutional:  Positive for chills.   Eyes:  Negative for visual disturbance.   Respiratory:  Negative for shortness of breath.    Cardiovascular:  Negative for chest pain.   Gastrointestinal:  Positive for abdominal distention, abdominal pain and nausea. Negative for constipation.   Endocrine: Negative for polyuria.   Genitourinary:  Negative for decreased urine volume and dysuria.   Neurological:  Negative for dizziness, syncope and weakness.       Physical Exam  Physical Exam  Constitutional:       Appearance: He is well-developed. He is ill-appearing.   HENT:      Head: Normocephalic and atraumatic.   Eyes:      Conjunctiva/sclera: Conjunctivae normal.      Pupils: Pupils are equal, round, and reactive to light.   Cardiovascular:      Rate and Rhythm: Normal rate and regular rhythm.      Heart sounds: Normal heart sounds.   Pulmonary:      Effort: Pulmonary effort is normal. No respiratory distress.      Breath sounds: Normal breath sounds.   Abdominal:      General: There is distension.      Palpations: Abdomen is soft.      Tenderness: There is generalized abdominal tenderness. There is guarding.   Musculoskeletal:         General: Normal range of motion.      Cervical back: Normal range of motion and neck supple.   Skin:     General: Skin is warm and dry.   Neurological:      Mental Status: He is alert and oriented to person, place, and time.   Psychiatric:         Behavior: Behavior normal.         Thought Content: Thought content normal.         Judgment: Judgment normal.         Vital Signs  ED Triage Vitals   Temperature Pulse Respirations Blood Pressure SpO2   05/26/24 0206 05/26/24 0206 05/26/24 0204 05/26/24 0206 05/26/24 0206   100.1 °F (37.8 °C) 97 18 (!) 199/97 (!) 87 %      Temp Source Heart Rate Source Patient Position - Orthostatic VS BP Location FiO2 (%)    05/26/24 0206 05/26/24 0204 05/26/24 0204 05/26/24 0204 --   Temporal Monitor Lying Left arm       Pain Score       05/26/24 0204       8           Vitals:    05/26/24 0545 05/26/24 0600 05/26/24 0615 05/26/24 0715   BP: (!) 172/80 (!) 174/82 161/77 144/73   Pulse: (!) 109 (!) 123 (!) 110 (!) 108   Patient Position - Orthostatic VS: Sitting Sitting Sitting          Visual Acuity      ED Medications  Medications   HYDROmorphone HCl (DILAUDID) injection 0.2 mg (0.2 mg Intravenous Given 5/26/24 0227)   iohexol (OMNIPAQUE) 350 MG/ML injection (SINGLE-DOSE) 100 mL (100 mL Intravenous Given 5/26/24 0249)   HYDROmorphone (DILAUDID) injection 0.5 mg (0.5 mg Intravenous Given 5/26/24 0341)   ondansetron (ZOFRAN) injection 4 mg (4 mg Intravenous Given 5/26/24 0428)   piperacillin-tazobactam (ZOSYN) IVPB 4.5 g (0 g Intravenous Stopped 5/26/24 0557)   albuterol inhalation solution 2.5 mg (2.5 mg Nebulization Given 5/26/24 0510)   sodium chloride 0.9 % bolus 1,000 mL (0 mL Intravenous Stopped 5/26/24 0513)     Followed by   sodium chloride 0.9 % bolus 1,000 mL (0 mL Intravenous Stopped 5/26/24 0610)     Followed by   sodium chloride 0.9 % bolus 1,000 mL (0 mL Intravenous Stopped 5/26/24 0703)   HYDROmorphone (DILAUDID) injection 0.5 mg (0.5 mg Intravenous Given 5/26/24 0701)       Diagnostic Studies  Results Reviewed       Procedure Component Value Units Date/Time    Blood Culture Identification Panel [[id number]] Collected: 05/26/24 0444    Lab Status: In process Specimen: Blood from Arm, Right Updated: 05/26/24 2130    Blood culture #1 [[id number]]  (Abnormal) Collected: 05/26/24 0444    Lab Status: Preliminary result Specimen: Blood from Arm, Right Updated: 05/26/24 2129     Gram Stain Result Gram negative rods    Blood culture #2 [[id number]]  (Abnormal) Collected: 05/26/24 0501    Lab Status: Preliminary result Specimen: Blood from Hand, Right Updated: 05/26/24 2128     Gram Stain Result Gram negative rods    HS Troponin I  4hr [[id number]]  (Normal) Collected: 05/26/24 0616    Lab Status: Final result Specimen: Blood from Arm, Right Updated: 05/26/24 0648     hs TnI 4hr 6 ng/L      Delta 4hr hsTnI 0 ng/L     FLU/RSV/COVID - if FLU/RSV clinically relevant [[id number]]  (Normal) Collected: 05/26/24 0501    Lab Status: Final result Specimen: Nares from Nose Updated: 05/26/24 0551     SARS-CoV-2 Negative     INFLUENZA A PCR Negative     INFLUENZA B PCR Negative     RSV PCR Negative    Narrative:      FOR PEDIATRIC PATIENTS - copy/paste COVID Guidelines URL to browser: [email or web address]    SARS-CoV-2 assay is a Nucleic Acid Amplification assay intended for the  qualitative detection of nucleic acid from SARS-CoV-2 in nasopharyngeal  swabs. Results are for the presumptive identification of SARS-CoV-2 RNA.    Positive results are indicative of infection with SARS-CoV-2, the virus  causing COVID-19, but do not rule out bacterial infection or co-infection  with other viruses. Laboratories within the [place] and its  territories are required to report all positive results to the appropriate  public health authorities. Negative results do not preclude SARS-CoV-2  infection and should not be used as the sole basis for treatment or other  patient management decisions. Negative results must be combined with  clinical observations, patient history, and epidemiological information.  This test has not been FDA cleared or approved.    This test has been authorized by FDA under an Emergency Use Authorization  (EUA). This test is only authorized for the duration of time the  declaration that circumstances exist justifying the authorization of the  emergency use of an in vitro diagnostic tests for detection of SARS-CoV-2  virus and/or diagnosis of COVID-19 infection under section 564(b)(1) of  the Act, 21 U.S.C. 360bbb-3(b)(1), unless the authorization is terminated  or revoked sooner. The test  has been validated but independent review by FDA  and CLIA is pending.    Test performed using [employer] GeneXpert: This RT-PCR assay targets N2,  a region unique to SARS-CoV-2. A conserved region in the E-gene was chosen  for pan-Sarbecovirus detection which includes SARS-CoV-2.    According to CMS-2020-01-R, this platform meets the definition of high-throughput technology.    Procalcitonin [[id number]]  (Abnormal) Collected: 05/26/24 0501    Lab Status: Final result Specimen: Blood from Arm, Right Updated: 05/26/24 0537     Procalcitonin 0.74 ng/ml     Protime-INR [[id number]]  (Abnormal) Collected: 05/26/24 0501    Lab Status: Final result Specimen: Blood from Arm, Right Updated: 05/26/24 0530     Protime 17.3 seconds      INR 1.40    APTT [[id number]]  (Normal) Collected: 05/26/24 0501    Lab Status: Final result Specimen: Blood from Arm, Right Updated: 05/26/24 0530     PTT 30 seconds     Lactic acid [[id number]]  (Normal) Collected: 05/26/24 0501    Lab Status: Final result Specimen: Blood from Hand, Right Updated: 05/26/24 0530     LACTIC ACID 0.7 mmol/L     Narrative:      Result may be elevated if tourniquet was used during collection.    HS Troponin I 2hr [[id number]]  (Normal) Collected: 05/26/24 0425    Lab Status: Final result Specimen: Blood from Arm, Right Updated: 05/26/24 0452     hs TnI 2hr 8 ng/L      Delta 2hr hsTnI 2 ng/L     B-Type Natriuretic Peptide(BNP) [[id number]]  (Normal) Collected: 05/26/24 0210    Lab Status: Final result Specimen: Blood Updated: 05/26/24 0257     BNP 76 pg/mL     Urine Microscopic [[id number]] Collected: 05/26/24 0228    Lab Status: Final result Specimen: Urine, Other Updated: 05/26/24 0248     RBC, UA 1-2 /hpf      WBC, UA 0-1 /hpf      Epithelial Cells None Seen /hpf      Bacteria, UA Occasional /hpf      AMORPH PHOSPATES Occasional /hpf     HS Troponin 0hr (reflex protocol) [[id number]]  (Normal) Collected: 05/26/24 0210    Lab Status: Final result Specimen: Blood Updated:  05/26/24 0240     hs TnI 0hr 6 ng/L     UA w Reflex to Microscopic w Reflex to Culture [[id number]]  (Abnormal) Collected: 05/26/24 0228    Lab Status: Final result Specimen: Urine, Other Updated: 05/26/24 0238     Color, UA Nataly     Clarity, UA Clear     Specific Gravity, UA 1.020     pH, UA 7.5     Leukocytes, UA Negative     Nitrite, UA Negative     Protein, UA Trace mg/dl      Glucose, UA Trace mg/dl      Ketones, UA Negative mg/dl      Urobilinogen, UA 4.0 E.U./dl      Bilirubin, UA Negative     Occult Blood, UA Negative    CMP [[id number]]  (Abnormal) Collected: 05/26/24 0210    Lab Status: Final result Specimen: Blood from Arm, Right Updated: 05/26/24 0233     Sodium 138 mmol/L      Potassium 3.8 mmol/L      Chloride 101 mmol/L      CO2 26 mmol/L      ANION GAP 11 mmol/L      BUN 14 mg/dL      Creatinine 1.06 mg/dL      Glucose 162 mg/dL      Calcium 9.6 mg/dL       U/L       U/L      Alkaline Phosphatase 141 U/L      Total Protein 8.1 g/dL      Albumin 4.3 g/dL      Total Bilirubin 2.46 mg/dL      eGFR 74 ml/min/1.73sq m     Narrative:      National Kidney Disease Foundation guidelines for Chronic Kidney Disease (CKD):     Stage 1 with normal or high GFR (GFR > 90 mL/min/1.73 square meters)    Stage 2 Mild CKD (GFR = 60-89 mL/min/1.73 square meters)    Stage 3A Moderate CKD (GFR = 45-59 mL/min/1.73 square meters)    Stage 3B Moderate CKD (GFR = 30-44 mL/min/1.73 square meters)    Stage 4 Severe CKD (GFR = 15-29 mL/min/1.73 square meters)    Stage 5 End Stage CKD (GFR <15 mL/min/1.73 square meters)  Note: GFR calculation is accurate only with a steady state creatinine    Lipase [[id number]]  (Abnormal) Collected: 05/26/24 0210    Lab Status: Final result Specimen: Blood from Arm, Right Updated: 05/26/24 0233     Lipase 133 u/L     CBC and differential [[id number]]  (Abnormal) Collected: 05/26/24 0210    Lab Status: Final result Specimen: Blood from Arm, Right Updated: 05/26/24 0215     WBC 11.00  Thousand/uL      RBC 4.45 Million/uL      Hemoglobin 13.5 g/dL      Hematocrit 40.5 %      MCV 91 fL      MCH 30.3 pg      MCHC 33.3 g/dL      RDW 13.8 %      MPV 9.9 fL      Platelets 291 Thousands/uL      nRBC 0 /100 WBCs      Segmented % 73 %      Immature Grans % 2 %      Lymphocytes % 18 %      Monocytes % 4 %      Eosinophils Relative 3 %      Basophils Relative 0 %      Absolute Neutrophils 8.01 Thousands/µL      Absolute Immature Grans 0.19 Thousand/uL      Absolute Lymphocytes 1.96 Thousands/µL      Absolute Monocytes 0.46 Thousand/µL      Eosinophils Absolute 0.34 Thousand/µL      Basophils Absolute 0.04 Thousands/µL                    PE Study with CT abdomen & pelvis with contrast   Final Result by Jose Douglas MD (05/26 0347)      1.  Status post cholecystectomy. Mild fat stranding/fluid within the cholecystectomy bed likely postoperative.   2.  Persistent fat stranding in the peripancreatic and right upper quadrant which may be due to pancreatitis.   3.  Mild thickening of the duodenum and hepatic flexure which may be due to reactive duodenitis/colitis.   4.  Colonic diverticulosis without evidence of diverticulitis.         Workstation performed: [id number]                    Procedures  CriticalCare Time    Date/Time: 5/27/2024 1:02 AM    Performed by: Rich Gillis MD  Authorized by: Rich Gillis MD    Critical care provider statement:     Critical care time (minutes):  35    Critical care time was exclusive of:  Separately billable procedures and treating other patients and teaching time    Critical care was necessary to treat or prevent imminent or life-threatening deterioration of the following conditions:  Sepsis and respiratory failure    Critical care was time spent personally by me on the following activities:  Blood draw for specimens, obtaining history from patient or surrogate, development of treatment plan with patient or surrogate, evaluation of patient's response to treatment,  examination of patient, interpretation of cardiac output measurements, ordering and performing treatments and interventions, ordering and review of laboratory studies, ordering and review of radiographic studies, re-evaluation of patient's condition, review of old charts and discussions with consultants    I assumed direction of critical care for this patient from another provider in my specialty: no             ED Course                               SBIRT 20yo+      Flowsheet Row Most Recent Value   Initial Alcohol Screen: US AUDIT-C     1. How often do you have a drink containing alcohol? 0 Filed at: 05/26/2024 0206   2. How many drinks containing alcohol do you have on a typical day you are drinking?  0 Filed at: 05/26/2024 0206   3a. Male UNDER 65: How often do you have five or more drinks on one occasion? 0 Filed at: 05/26/2024 0206   3b. FEMALE Any Age, or MALE 65+: How often do you have 4 or more drinks on one occassion? 0 Filed at: 05/26/2024 0206   Audit-C Score 0 Filed at: 05/26/2024 0206   GIGI: How many times in the past year have you...    Used an illegal drug or used a prescription medication for non-medical reasons? Never Filed at: 05/26/2024 0206                      Medical Decision Making  63-year-old male with sepsis secondary to cholangitis.  Sepsis order set completed.  Concern regarding respiratory distress that may be primary pulmonary.  He appears to be more due to infectious process.  Patient requiring 7 to 8 L nasal cannula.  No response to nebulization.  Received fluids and antibiotics.  Required multiple doses of IV pain medication due to severe pain.  We do not have ERCP available.  Patient needs this so he was going at 7:00 in the morning.  Patient has accepting physician.  States understanding and agreement with the plan.  Patient stable at the time of signout to Dr. Correa.    Problems Addressed:  Cholangitis: acute illness or injury    Amount and/or Complexity of Data  Reviewed  Independent Historian: spouse  External Data Reviewed: notes.  Labs: ordered.  Radiology: ordered.  ECG/medicine tests: ordered and independent interpretation performed.  Discussion of management or test interpretation with external provider(s): Discussed with Dr. Munoz of general surgery as well as Dr. Lafleur of surgery at [place].  They both agree with plan for transfer to [place] this patient needs an ERCP which we do not have available at our facility.    Risk  OTC drugs.  Prescription drug management.  Parenteral controlled substances.  Drug therapy requiring intensive monitoring for toxicity.             Disposition  Final diagnoses:   Cholangitis     Time reflects when diagnosis was documented in both MDM as applicable and the Disposition within this note       Time User Action Codes Description Comment    5/26/2024  4:52 AM Rich Gillis Add [K83.09] Cholangitis           ED Disposition       ED Disposition   Transfer to Another Facility-In Network    Condition   --    Date/Time   Sun May 26, 2024 0454    Comment   Don Valdez should be transferred out to [place].               MD Documentation      Flowsheet Row Most Recent Value   Accepting Physician Dr. Lafleur   Accepting Facility Name, [facility]   Sending MD Dr. Gillis          RN Documentation      Flowsheet Row Most Recent Value   Accepting Facility Name, [facility]   Report Given to Lizzeth BONE          Follow-up Information    None         Discharge Medication List as of 5/26/2024  7:31 AM        CONTINUE these medications which have NOT CHANGED    Details   gabapentin (NEURONTIN) 300 mg capsule Starting Fri 8/18/2023, Historical Med      oxyCODONE (ROXICODONE) 5 immediate release tablet Take 1 tablet (5 mg total) by mouth every 4 (four) hours as needed for moderate pain for up to 10 days Max Daily Amount: 30 mg, Starting Fri 5/24/2024, Until Mon 6/3/2024 at 2359, Normal       pantoprazole (Protonix) 20 mg tablet Take 20 mg by mouth daily, Historical Med      pravastatin (PRAVACHOL) 10 mg tablet Take 10 mg by mouth daily, Historical Med             No discharge procedures on file.    PDMP Review       None            ED Provider  Electronically Signed by             Rich Gillis MD  05/27/24 0112

## 2024-05-26 NOTE — ED NOTES
Patient vomited and became more tachypnic with low 02 sats at 86% on 6 liters.      Lizzeth Skinner RN  05/26/24 0864

## 2024-05-26 NOTE — H&P
H&P - General Surgery   Don Valdez 63 y.o. male MRN: [id number]  Unit/Bed#: ED 05 Encounter: [id number]    Assessment & Plan     Assessment:  63 year old male s/p 5/23/24 laparoscopic cholecystectomy with IOC c/b retained stone in CBD, presents as a transfer from [facility] with elevated LFTs, pancreatitis and c/f cholangitis.    5/26 CT: Status post cholecystectomy. Mild fat stranding/fluid within the cholecystectomy bed likely postoperative. Persistent fat stranding in the peripancreatic and right upper quadrant which may be due to pancreatitis. Mild thickening of the duodenum and hepatic flexure which may be due to reactive duodenitis/colitis.     Febrile to 101.9 F on arrival to ED with persistent tachycardia and tachypnea. Requiring 6L NC with saturations 89-91%.   Episode of emesis on arrival with subsequent increase in O2 requirements shortly thereafter, c/f aspiration    WBC 11.00   Hgb 13.5  Cr 1.06  AST//187  Alk Phos 141  T bili 2.46  LA 0.7    Plan:  Given c/f cholangitis, will admit patient to critical care. Expected LOS >2 midnights. Discussed with ICU team  GI consulted for ERCP  NPO with IVF  Start IV Zosyn d/t concern for cholangitis   Obtain blood cx  Given emesis and dilated stomach, c/f outlet obstruction from inflammation, will have NG tube placed at this time  Pain/nausea control PRN  DVT ppx: SQH  Incentive spirometry     History of Present Illness   HPI:  Don Valdez is a 63 y.o. male who presents as a transfer from [facility] d/t retained gallstone s/p laparoscopic cholecystectomy with IOC on 5/23. Patient presented with abdominal pain, nausea and vomiting. At arrival to ED there, he had a temperature to 100.1 F. He was transferred to [facility] for surgical/GI evaluation. On arrival, he had an episode of emesis and increased O2 requirements with increasing hypoxia. Additionally, febrile to 101.9 F with associated tachycardia to 120s. He reports continued nausea and feeling like his abdomen  is distended. Denies chills at home.     Review of Systems   Constitutional:  Positive for activity change, appetite change and fever. Negative for chills.   Respiratory:  Positive for shortness of breath. Negative for chest tightness.    Gastrointestinal:  Positive for abdominal distention, abdominal pain, nausea and vomiting.   Skin:  Positive for wound. Negative for color change.   Psychiatric/Behavioral:  Negative for agitation, behavioral problems and confusion.        Historical Information   Past Medical History:   Diagnosis Date    GERD (gastroesophageal reflux disease)     High cholesterol     Restless leg syndrome      Past Surgical History:   Procedure Laterality Date    CARPAL TUNNEL RELEASE Bilateral     CHOLECYSTECTOMY LAPAROSCOPIC N/A 5/23/2024    Procedure: CHOLECYSTECTOMY LAPAROSCOPIC W/ INTRAOP CHOLANGIOGRAM;  Surgeon: Don Bethea MD;  Location: [facility];  Service: General    ORIF FINGER / THUMB FRACTURE       Social History   Social History     Substance and Sexual Activity   Alcohol Use Not Currently     Social History     Substance and Sexual Activity   Drug Use Not Currently     E-Cigarette/Vaping    E-Cigarette Use Never User      E-Cigarette/Vaping Substances    Nicotine No     THC No     CBD No     Flavoring No     Other No     Unknown No      Social History     Tobacco Use   Smoking Status Never   Smokeless Tobacco Never     Family History: History reviewed. No pertinent family history.    Meds/Allergies   PTA meds:   Prior to Admission Medications   Prescriptions Last Dose Informant Patient Reported? Taking?   gabapentin (NEURONTIN) 300 mg capsule   Yes No   oxyCODONE (ROXICODONE) 5 immediate release tablet   No No   Sig: Take 1 tablet (5 mg total) by mouth every 4 (four) hours as needed for moderate pain for up to 10 days Max Daily Amount: 30 mg      Facility-Administered Medications: None     Allergies   Allergen Reactions    Tetracycline        Objective   First Vitals:   Blood  Pressure: (!) 175/84 (05/26/24 0838)  Pulse: (!) 122 (05/26/24 0838)  Temperature: (!) 101.9 °F (38.8 °C) (05/26/24 0852)  Temp Source: Oral (05/26/24 0852)  Respirations: 22 (05/26/24 0838)  SpO2: 94 % (05/26/24 0838)    Current Vitals:   Blood Pressure: 154/72 (05/26/24 0900)  Pulse: (!) 110 (05/26/24 0900)  Temperature: (!) 101.9 °F (38.8 °C) (05/26/24 0852)  Temp Source: Oral (05/26/24 0852)  Respirations: (!) 36 (05/26/24 0900)  SpO2: 91 % (05/26/24 0900)    No intake or output data in the 24 hours ending 05/26/24 0910    Invasive Devices       Peripheral Intravenous Line  Duration             Peripheral IV 05/26/24 Dorsal (posterior);Right Hand <1 day    Peripheral IV 05/26/24 Right Antecubital <1 day                    Physical Exam  Vitals and nursing note reviewed.   Constitutional:       General: He is not in acute distress.     Appearance: Normal appearance. He is ill-appearing.   HENT:      Head: Normocephalic and atraumatic.      Mouth/Throat:      Mouth: Mucous membranes are moist.      Pharynx: Oropharynx is clear.   Eyes:      Extraocular Movements: Extraocular movements intact.   Cardiovascular:      Rate and Rhythm: Regular rhythm. Tachycardia present.   Pulmonary:      Effort: Tachypnea present.   Abdominal:      General: There is distension.      Palpations: Abdomen is soft.      Tenderness: There is abdominal tenderness (epigastric, RUQ). There is no guarding or rebound.      Comments: Prior surgical scars CDI   Musculoskeletal:      Cervical back: Neck supple.      Right lower leg: No edema.      Left lower leg: No edema.   Skin:     General: Skin is warm and dry.      Coloration: Skin is not jaundiced.   Neurological:      Mental Status: He is alert. Mental status is at baseline.   Psychiatric:         Mood and Affect: Mood normal.         Behavior: Behavior normal.         Thought Content: Thought content normal.         Lab Results: CBC:   Lab Results   Component Value Date    WBC 11.00 (H)  05/26/2024    HGB 13.5 05/26/2024    HCT 40.5 05/26/2024    MCV 91 05/26/2024     05/26/2024    RBC 4.45 05/26/2024    MCH 30.3 05/26/2024    MCHC 33.3 05/26/2024    RDW 13.8 05/26/2024    MPV 9.9 05/26/2024    NRBC 0 05/26/2024   , CMP:   Lab Results   Component Value Date    SODIUM 138 05/26/2024    K 3.8 05/26/2024     05/26/2024    CO2 26 05/26/2024    BUN 14 05/26/2024    CREATININE 1.06 05/26/2024    CALCIUM 9.6 05/26/2024     (H) 05/26/2024     (H) 05/26/2024    ALKPHOS 141 (H) 05/26/2024    EGFR 74 05/26/2024     Imaging: I have personally reviewed pertinent reports.    EKG, Pathology, and Other Studies: I have personally reviewed pertinent reports.      Counseling / Coordination of Care  Total floor / unit time spent today 40 minutes.  Greater than 50% of total time was spent with the patient and / or family counseling and / or coordination of care.

## 2024-05-27 ENCOUNTER — APPOINTMENT (INPATIENT)
Dept: RADIOLOGY | Facility: HOSPITAL | Age: 64
DRG: 862 | End: 2024-05-27
Payer: COMMERCIAL

## 2024-05-27 ENCOUNTER — ANESTHESIA (INPATIENT)
Dept: PERIOP | Facility: HOSPITAL | Age: 64
DRG: 862 | End: 2024-05-27
Payer: COMMERCIAL

## 2024-05-27 ENCOUNTER — ANESTHESIA EVENT (INPATIENT)
Dept: PERIOP | Facility: HOSPITAL | Age: 64
DRG: 862 | End: 2024-05-27
Payer: COMMERCIAL

## 2024-05-27 ENCOUNTER — APPOINTMENT (OUTPATIENT)
Dept: PERIOP | Facility: HOSPITAL | Age: 64
DRG: 862 | End: 2024-05-27
Payer: COMMERCIAL

## 2024-05-27 LAB
ALBUMIN SERPL BCP-MCNC: 3 G/DL (ref 3.5–5)
ALP SERPL-CCNC: 101 U/L (ref 34–104)
ALT SERPL W P-5'-P-CCNC: 132 U/L (ref 7–52)
ANION GAP SERPL CALCULATED.3IONS-SCNC: 9 MMOL/L (ref 4–13)
ANISOCYTOSIS BLD QL SMEAR: PRESENT
AST SERPL W P-5'-P-CCNC: 94 U/L (ref 13–39)
BASOPHILS # BLD MANUAL: 0 THOUSAND/UL (ref 0–0.1)
BASOPHILS NFR MAR MANUAL: 0 % (ref 0–1)
BILIRUB SERPL-MCNC: 3.55 MG/DL (ref 0.2–1)
BUN SERPL-MCNC: 16 MG/DL (ref 5–25)
CALCIUM ALBUM COR SERPL-MCNC: 8.8 MG/DL (ref 8.3–10.1)
CALCIUM SERPL-MCNC: 8 MG/DL (ref 8.4–10.2)
CHLORIDE SERPL-SCNC: 106 MMOL/L (ref 96–108)
CO2 SERPL-SCNC: 26 MMOL/L (ref 21–32)
CREAT SERPL-MCNC: 0.95 MG/DL (ref 0.6–1.3)
EOSINOPHIL # BLD MANUAL: 0.79 THOUSAND/UL (ref 0–0.4)
EOSINOPHIL NFR BLD MANUAL: 5 % (ref 0–6)
ERYTHROCYTE [DISTWIDTH] IN BLOOD BY AUTOMATED COUNT: 14.9 % (ref 11.6–15.1)
GFR SERPL CREATININE-BSD FRML MDRD: 84 ML/MIN/1.73SQ M
GIANT PLATELETS BLD QL SMEAR: PRESENT
GLUCOSE SERPL-MCNC: 110 MG/DL (ref 65–140)
GLUCOSE SERPL-MCNC: 118 MG/DL (ref 65–140)
HCT VFR BLD AUTO: 32.2 % (ref 36.5–49.3)
HGB BLD-MCNC: 10.6 G/DL (ref 12–17)
LYMPHOCYTES # BLD AUTO: 0.95 THOUSAND/UL (ref 0.6–4.47)
LYMPHOCYTES # BLD AUTO: 4 % (ref 14–44)
MAGNESIUM SERPL-MCNC: 1.9 MG/DL (ref 1.9–2.7)
MCH RBC QN AUTO: 30.4 PG (ref 26.8–34.3)
MCHC RBC AUTO-ENTMCNC: 32.9 G/DL (ref 31.4–37.4)
MCV RBC AUTO: 92 FL (ref 82–98)
METAMYELOCYTE ABSOLUTE CT: 0.79 THOUSAND/UL (ref 0–0.1)
METAMYELOCYTES NFR BLD MANUAL: 5 % (ref 0–1)
MONOCYTES # BLD AUTO: 0.64 THOUSAND/UL (ref 0–1.22)
MONOCYTES NFR BLD: 4 % (ref 4–12)
NEUTROPHILS # BLD MANUAL: 12.7 THOUSAND/UL (ref 1.85–7.62)
NEUTS BAND NFR BLD MANUAL: 35 % (ref 0–8)
NEUTS SEG NFR BLD AUTO: 45 % (ref 43–75)
OVALOCYTES BLD QL SMEAR: PRESENT
PHOSPHATE SERPL-MCNC: 2.3 MG/DL (ref 2.3–4.1)
PLATELET # BLD AUTO: 229 THOUSANDS/UL (ref 149–390)
PLATELET BLD QL SMEAR: ADEQUATE
PMV BLD AUTO: 10.3 FL (ref 8.9–12.7)
POIKILOCYTOSIS BLD QL SMEAR: PRESENT
POTASSIUM SERPL-SCNC: 3.7 MMOL/L (ref 3.5–5.3)
PROT SERPL-MCNC: 5.7 G/DL (ref 6.4–8.4)
RBC # BLD AUTO: 3.49 MILLION/UL (ref 3.88–5.62)
RBC MORPH BLD: PRESENT
SODIUM SERPL-SCNC: 141 MMOL/L (ref 135–147)
VARIANT LYMPHS # BLD AUTO: 2 %
WBC # BLD AUTO: 15.88 THOUSAND/UL (ref 4.31–10.16)

## 2024-05-27 PROCEDURE — C1889 IMPLANT/INSERT DEVICE, NOC: HCPCS | Performed by: STUDENT IN AN ORGANIZED HEALTH CARE EDUCATION/TRAINING PROGRAM

## 2024-05-27 PROCEDURE — 80053 COMPREHEN METABOLIC PANEL: CPT

## 2024-05-27 PROCEDURE — 43274 ERCP DUCT STENT PLACEMENT: CPT | Performed by: INTERNAL MEDICINE

## 2024-05-27 PROCEDURE — C1769 GUIDE WIRE: HCPCS | Performed by: STUDENT IN AN ORGANIZED HEALTH CARE EDUCATION/TRAINING PROGRAM

## 2024-05-27 PROCEDURE — C9113 INJ PANTOPRAZOLE SODIUM, VIA: HCPCS

## 2024-05-27 PROCEDURE — 99291 CRITICAL CARE FIRST HOUR: CPT | Performed by: STUDENT IN AN ORGANIZED HEALTH CARE EDUCATION/TRAINING PROGRAM

## 2024-05-27 PROCEDURE — 99232 SBSQ HOSP IP/OBS MODERATE 35: CPT | Performed by: SURGERY

## 2024-05-27 PROCEDURE — 0F798DZ DILATION OF COMMON BILE DUCT WITH INTRALUMINAL DEVICE, VIA NATURAL OR ARTIFICIAL OPENING ENDOSCOPIC: ICD-10-PCS | Performed by: INTERNAL MEDICINE

## 2024-05-27 PROCEDURE — 85027 COMPLETE CBC AUTOMATED: CPT

## 2024-05-27 PROCEDURE — C2617 STENT, NON-COR, TEM W/O DEL: HCPCS | Performed by: STUDENT IN AN ORGANIZED HEALTH CARE EDUCATION/TRAINING PROGRAM

## 2024-05-27 PROCEDURE — 74330 X-RAY BILE/PANC ENDOSCOPY: CPT

## 2024-05-27 PROCEDURE — 0FC98ZZ EXTIRPATION OF MATTER FROM COMMON BILE DUCT, VIA NATURAL OR ARTIFICIAL OPENING ENDOSCOPIC: ICD-10-PCS | Performed by: INTERNAL MEDICINE

## 2024-05-27 PROCEDURE — 84100 ASSAY OF PHOSPHORUS: CPT

## 2024-05-27 PROCEDURE — 82948 REAGENT STRIP/BLOOD GLUCOSE: CPT

## 2024-05-27 PROCEDURE — 83735 ASSAY OF MAGNESIUM: CPT

## 2024-05-27 PROCEDURE — 85007 BL SMEAR W/DIFF WBC COUNT: CPT

## 2024-05-27 PROCEDURE — 43264 ERCP REMOVE DUCT CALCULI: CPT | Performed by: INTERNAL MEDICINE

## 2024-05-27 PROCEDURE — 0F7D8DZ DILATION OF PANCREATIC DUCT WITH INTRALUMINAL DEVICE, VIA NATURAL OR ARTIFICIAL OPENING ENDOSCOPIC: ICD-10-PCS | Performed by: INTERNAL MEDICINE

## 2024-05-27 RX ORDER — MIDAZOLAM HYDROCHLORIDE 2 MG/2ML
INJECTION, SOLUTION INTRAMUSCULAR; INTRAVENOUS AS NEEDED
Status: DISCONTINUED | OUTPATIENT
Start: 2024-05-27 | End: 2024-05-27

## 2024-05-27 RX ORDER — DEXAMETHASONE SODIUM PHOSPHATE 10 MG/ML
INJECTION, SOLUTION INTRAMUSCULAR; INTRAVENOUS AS NEEDED
Status: DISCONTINUED | OUTPATIENT
Start: 2024-05-27 | End: 2024-05-27

## 2024-05-27 RX ORDER — MAGNESIUM SULFATE HEPTAHYDRATE 40 MG/ML
2 INJECTION, SOLUTION INTRAVENOUS ONCE
Status: COMPLETED | OUTPATIENT
Start: 2024-05-27 | End: 2024-05-27

## 2024-05-27 RX ORDER — ROCURONIUM BROMIDE 10 MG/ML
INJECTION, SOLUTION INTRAVENOUS AS NEEDED
Status: DISCONTINUED | OUTPATIENT
Start: 2024-05-27 | End: 2024-05-27

## 2024-05-27 RX ORDER — POTASSIUM CHLORIDE 14.9 MG/ML
20 INJECTION INTRAVENOUS ONCE
Status: COMPLETED | OUTPATIENT
Start: 2024-05-27 | End: 2024-05-27

## 2024-05-27 RX ORDER — FENTANYL CITRATE 50 UG/ML
INJECTION, SOLUTION INTRAMUSCULAR; INTRAVENOUS AS NEEDED
Status: DISCONTINUED | OUTPATIENT
Start: 2024-05-27 | End: 2024-05-27

## 2024-05-27 RX ORDER — SODIUM CHLORIDE, SODIUM LACTATE, POTASSIUM CHLORIDE, CALCIUM CHLORIDE 600; 310; 30; 20 MG/100ML; MG/100ML; MG/100ML; MG/100ML
INJECTION, SOLUTION INTRAVENOUS CONTINUOUS PRN
Status: DISCONTINUED | OUTPATIENT
Start: 2024-05-27 | End: 2024-05-27

## 2024-05-27 RX ORDER — FENTANYL CITRATE/PF 50 MCG/ML
25 SYRINGE (ML) INJECTION
Status: DISCONTINUED | OUTPATIENT
Start: 2024-05-27 | End: 2024-05-27 | Stop reason: HOSPADM

## 2024-05-27 RX ORDER — ONDANSETRON 2 MG/ML
4 INJECTION INTRAMUSCULAR; INTRAVENOUS ONCE AS NEEDED
Status: DISCONTINUED | OUTPATIENT
Start: 2024-05-27 | End: 2024-05-27 | Stop reason: HOSPADM

## 2024-05-27 RX ORDER — LIDOCAINE HYDROCHLORIDE 10 MG/ML
INJECTION, SOLUTION EPIDURAL; INFILTRATION; INTRACAUDAL; PERINEURAL AS NEEDED
Status: DISCONTINUED | OUTPATIENT
Start: 2024-05-27 | End: 2024-05-27

## 2024-05-27 RX ORDER — HYDROMORPHONE HCL/PF 1 MG/ML
0.5 SYRINGE (ML) INJECTION
Status: DISCONTINUED | OUTPATIENT
Start: 2024-05-27 | End: 2024-05-27 | Stop reason: HOSPADM

## 2024-05-27 RX ORDER — ONDANSETRON 2 MG/ML
INJECTION INTRAMUSCULAR; INTRAVENOUS AS NEEDED
Status: DISCONTINUED | OUTPATIENT
Start: 2024-05-27 | End: 2024-05-27

## 2024-05-27 RX ORDER — METOCLOPRAMIDE HYDROCHLORIDE 5 MG/ML
10 INJECTION INTRAMUSCULAR; INTRAVENOUS ONCE AS NEEDED
Status: DISCONTINUED | OUTPATIENT
Start: 2024-05-27 | End: 2024-05-27 | Stop reason: HOSPADM

## 2024-05-27 RX ORDER — GABAPENTIN 250 MG/5ML
300 SOLUTION ORAL
Status: DISCONTINUED | OUTPATIENT
Start: 2024-05-27 | End: 2024-05-31 | Stop reason: HOSPADM

## 2024-05-27 RX ORDER — PROPOFOL 10 MG/ML
INJECTION, EMULSION INTRAVENOUS AS NEEDED
Status: DISCONTINUED | OUTPATIENT
Start: 2024-05-27 | End: 2024-05-27

## 2024-05-27 RX ADMIN — GABAPENTIN 300 MG: 250 SOLUTION ORAL at 22:35

## 2024-05-27 RX ADMIN — DEXAMETHASONE SODIUM PHOSPHATE 10 MG: 10 INJECTION, SOLUTION INTRAMUSCULAR; INTRAVENOUS at 15:47

## 2024-05-27 RX ADMIN — SODIUM CHLORIDE, SODIUM GLUCONATE, SODIUM ACETATE, POTASSIUM CHLORIDE, MAGNESIUM CHLORIDE, SODIUM PHOSPHATE, DIBASIC, AND POTASSIUM PHOSPHATE 125 ML/HR: .53; .5; .37; .037; .03; .012; .00082 INJECTION, SOLUTION INTRAVENOUS at 18:23

## 2024-05-27 RX ADMIN — ACETAMINOPHEN 1000 MG: 10 INJECTION INTRAVENOUS at 03:37

## 2024-05-27 RX ADMIN — POTASSIUM CHLORIDE 20 MEQ: 14.9 INJECTION, SOLUTION INTRAVENOUS at 07:50

## 2024-05-27 RX ADMIN — PROPOFOL 200 MG: 10 INJECTION, EMULSION INTRAVENOUS at 15:47

## 2024-05-27 RX ADMIN — IOHEXOL 5 ML: 350 INJECTION, SOLUTION INTRAVENOUS at 16:15

## 2024-05-27 RX ADMIN — MIDAZOLAM 2 MG: 1 INJECTION INTRAMUSCULAR; INTRAVENOUS at 15:43

## 2024-05-27 RX ADMIN — PIPERACILLIN SODIUM AND TAZOBACTAM SODIUM 4.5 G: 36; 4.5 INJECTION, POWDER, LYOPHILIZED, FOR SOLUTION INTRAVENOUS at 22:35

## 2024-05-27 RX ADMIN — SODIUM CHLORIDE, SODIUM LACTATE, POTASSIUM CHLORIDE, AND CALCIUM CHLORIDE: .6; .31; .03; .02 INJECTION, SOLUTION INTRAVENOUS at 15:40

## 2024-05-27 RX ADMIN — ONDANSETRON 4 MG: 2 INJECTION INTRAMUSCULAR; INTRAVENOUS at 15:47

## 2024-05-27 RX ADMIN — PIPERACILLIN SODIUM AND TAZOBACTAM SODIUM 4.5 G: 36; 4.5 INJECTION, POWDER, LYOPHILIZED, FOR SOLUTION INTRAVENOUS at 14:06

## 2024-05-27 RX ADMIN — CHLORHEXIDINE GLUCONATE 15 ML: 1.2 SOLUTION ORAL at 22:35

## 2024-05-27 RX ADMIN — LIDOCAINE HYDROCHLORIDE 50 MG: 10 INJECTION, SOLUTION EPIDURAL; INFILTRATION; INTRACAUDAL; PERINEURAL at 15:47

## 2024-05-27 RX ADMIN — PANTOPRAZOLE SODIUM 40 MG: 40 INJECTION, POWDER, FOR SOLUTION INTRAVENOUS at 08:07

## 2024-05-27 RX ADMIN — SODIUM CHLORIDE, SODIUM GLUCONATE, SODIUM ACETATE, POTASSIUM CHLORIDE, MAGNESIUM CHLORIDE, SODIUM PHOSPHATE, DIBASIC, AND POTASSIUM PHOSPHATE 125 ML/HR: .53; .5; .37; .037; .03; .012; .00082 INJECTION, SOLUTION INTRAVENOUS at 01:31

## 2024-05-27 RX ADMIN — PIPERACILLIN SODIUM AND TAZOBACTAM SODIUM 4.5 G: 36; 4.5 INJECTION, POWDER, LYOPHILIZED, FOR SOLUTION INTRAVENOUS at 05:03

## 2024-05-27 RX ADMIN — MAGNESIUM SULFATE HEPTAHYDRATE 2 G: 40 INJECTION, SOLUTION INTRAVENOUS at 07:51

## 2024-05-27 RX ADMIN — ACETAMINOPHEN 1000 MG: 10 INJECTION INTRAVENOUS at 22:35

## 2024-05-27 RX ADMIN — ROCURONIUM BROMIDE 50 MG: 10 INJECTION, SOLUTION INTRAVENOUS at 15:47

## 2024-05-27 RX ADMIN — ENOXAPARIN SODIUM 40 MG: 40 INJECTION SUBCUTANEOUS at 08:07

## 2024-05-27 RX ADMIN — CHLORHEXIDINE GLUCONATE 15 ML: 1.2 SOLUTION ORAL at 08:07

## 2024-05-27 RX ADMIN — FENTANYL CITRATE 100 MCG: 50 INJECTION INTRAMUSCULAR; INTRAVENOUS at 15:47

## 2024-05-27 RX ADMIN — SUGAMMADEX 200 MG: 100 INJECTION, SOLUTION INTRAVENOUS at 17:05

## 2024-05-27 RX ADMIN — ACETAMINOPHEN 1000 MG: 10 INJECTION INTRAVENOUS at 12:14

## 2024-05-27 NOTE — PROGRESS NOTES
[facility]  Progress Note: Critical Care  Name: Don Valdez 63 y.o. male I MRN: [id number]  Unit/Bed#: [facility] 513-01 I Date of Admission: 5/26/2024   Date of Service: 5/27/2024 I Hospital Day: 1    Assessment & Plan     Neuro/Psych/ENT diagnosis: P/A/D.   Plan:  Pain controlled with:  Scheduled: Tlyenol IV q6h  PRN: dilaudid q3h   Regulate sleep/wake cycle as able  Delirium monitoring and precautions  CAM-ICU daily  Trend neuro exam  Cardiac/Vascular diagnosis: HTN.   Plan:  Cardiac infusions: None  MAP goal > 65  Follow rhythm on telemetry  Labetalol PRN for SBP >160  Pulmonary/Respiratory/Thoracic diagnosis: Acute hypoxic respiratory insufficiency.   Plan:  Wean O2 as able  SpO2 goal >92%  Pulmonary toileting   Encourage IS  GI/Hepatic diagnosis: Gallstone pancreatitis. S/P cholecystectomy 5/23/24. Retained gallstone in CBD.   Plan:  Trend abd exam and bowel function  Bowel regimen: PRN  Strict NPO  Appreciate GI and general sx consults  Trend LFTs  Pending ERCP  Nephro/ diagnosis: None.   Plan:  Baseline creat: 0.9-1.0  Trend UOP and BUN/creat  Strict I and O  F/E/N: None.   Plan:  Fluid/Diuretic plan: Isolyte @ 125 mL/hour  Nutrition/diet plan: Strict NPO  Replenish electrolytes with goals: K >4.0, Mag >2.0, and Phos >3.0  Heme/onc diagnosis: None.  Plan:  Trend hgb and plts  Transfuse as needed for goal hgb >7.0  Endo diagnosis: None.  Plan:  Glycemic control plan: PRN  Goal blood glucose 140-180  ID diagnosis: GNR bacteremia.   Plan:  Abx ordered: Zosyn  Day # 2   Trend temps and WBC count  Maintain normothermia  Cultures:   5/26 blood cx x2: GNR  Repeat cultures 5/28  MSK/Skin diagnosis: None.   Plan:  Mobility goal: OOB  PT and OT when appropriate  Frequent turning and pressure off-loading  Local wound care as needed    Disposition: Critical care    ICU Core Measures     A: Assess, Prevent, and Manage Pain Has pain been assessed? Yes  Need for changes to pain  regimen? No   B: Both SAT/SAT  N/A   C: Choice of Sedation RASS Goal: N/A patient not on sedation  Need for changes to sedation or analgesia regimen? NA   D: Delirium CAM-ICU: Negative   E: Early Mobility  Plan for early mobility? Yes   F: Family Engagement Plan for family engagement today? Yes       Antibiotic Review: Awaiting culture results.       Prophylaxis:  VTE VTE covered by:  enoxaparin, Subcutaneous       Stress Ulcer  covered bypantoprazole (PROTONIX) injection 40 mg [[id number]]        Significant 24hr Events     24hr events: Patient with positive blood cultures. No abd pain. No nausea. Slept well.      Subjective     Review of Systems   Respiratory:  Negative for chest tightness and shortness of breath.    Cardiovascular:  Negative for chest pain.   Gastrointestinal:  Negative for abdominal pain, nausea and vomiting.   All other systems reviewed and are negative.       Objective                            Vitals I/O      Most Recent Min/Max in 24hrs   Temp 97.9 °F (36.6 °C) Temp  Min: 97.9 °F (36.6 °C)  Max: 101.9 °F (38.8 °C)   Pulse 66 Pulse  Min: 65  Max: 123   Resp 14 Resp  Min: 14  Max: 47   /71 BP  Min: 100/60  Max: 199/97   O2 Sat 95 % SpO2  Min: 87 %  Max: 96 %      Intake/Output Summary (Last 24 hours) at 5/27/2024 0150  Last data filed at 5/27/2024 0000  Gross per 24 hour   Intake 2060.84 ml   Output 925 ml   Net 1135.84 ml       Diet NPO    Invasive Monitoring           Physical Exam   Physical Exam  Vitals reviewed.   Skin:     General: Skin is warm and dry.   Cardiovascular:      Rate and Rhythm: Normal rate and regular rhythm.      Heart sounds: No murmur heard.     No friction rub.   Abdominal:      Palpations: Abdomen is soft.      Tenderness: There is abdominal tenderness.   Constitutional:       General: He is not in acute distress.     Appearance: He is well-developed and well-nourished.   Pulmonary:      Effort: Pulmonary effort is normal. No respiratory distress.      Breath  sounds: Normal breath sounds.   Neurological:      General: No focal deficit present.            Diagnostic Studies      Imagin/26 CT: 1.  Status post cholecystectomy. Mild fat stranding/fluid within the cholecystectomy bed likely postoperative.  2.  Persistent fat stranding in the peripancreatic and right upper quadrant which may be due to pancreatitis.  3.  Mild thickening of the duodenum and hepatic flexure which may be due to reactive duodenitis/colitis.  4.  Colonic diverticulosis without evidence of diverticulitis. I have personally reviewed pertinent reports.       Medications:  Scheduled PRN   acetaminophen, 1,000 mg, Q6H KATHY  chlorhexidine, 15 mL, Q12H KATHY  enoxaparin, 40 mg, Q24H KATHY  pantoprazole, 40 mg, Q24H KATHY  piperacillin-tazobactam, 4.5 g, Q8H      HYDROmorphone, 0.5 mg, Q3H PRN  HYDROmorphone, 0.2 mg, Q3H PRN  labetalol, 10 mg, Q6H PRN  ondansetron, 4 mg, Q4H PRN       Continuous    multi-electrolyte, 125 mL/hr, Last Rate: 125 mL/hr (24 0131)         Labs:    CBC    Recent Labs     24  0210   WBC 11.00*   HGB 13.5   HCT 40.5        BMP    Recent Labs     24  0210   SODIUM 138   K 3.8      CO2 26   AGAP 11   BUN 14   CREATININE 1.06   CALCIUM 9.6       Coags    Recent Labs     24  0501   INR 1.40*   PTT 30        Additional Electrolytes  No recent results       Blood Gas    No recent results  No recent results LFTs  Recent Labs     24  0210   *   *   ALKPHOS 141*   ALB 4.3   TBILI 2.46*       Infectious  Recent Labs     24  0501   PROCALCITONI 0.74*     Glucose  Recent Labs     24  0210   GLUC 162*           Candy Lambert PA-C

## 2024-05-27 NOTE — ANESTHESIA POSTPROCEDURE EVALUATION
Post-Op Assessment Note    CV Status:  Stable  Pain Score: 0    Pain management: adequate       Mental Status:  Alert and awake   Hydration Status:  Euvolemic and stable   PONV Controlled:  None   Airway Patency:  Patent     Post Op Vitals Reviewed: Yes    No anethesia notable event occurred.    Staff: CRNA               /65 (05/27/24 1726)    Temp      Pulse 78 (05/27/24 1726)   Resp 21 (05/27/24 1726)    SpO2 97 % (05/27/24 1726)

## 2024-05-27 NOTE — PROGRESS NOTES
"General Surgery  Progress Note   Don Valdez 63 y.o. male MRN: [id number]  Unit/Bed#: [facility] 513-01 Encounter: [id number]    Assessment:  63 year old male s/p 24 laparoscopic cholecystectomy with IOC c/b retained stone in CBD, presents as a transfer from [facility] with elevated LFTs, pancreatitis and c/f cholangitis.     Vital signs stable, afebrile. This morning. Last documented fever of 100.8F at 12pm yesterday    UOP: 1000 cc    WBC 15.88 (11.00)  Hgb 10.6 (13.5)  Cr 0.95 (1.06)  T bili 3.55 (2.46)    Blood cultures: gram negative rods    Plan:  NPO today for GI evaluation for ERCP  Continue IV Zosyn  Monitor fever, leukocytosis  Appreciate GI recommendations  DVT ppx: Lovenox  Pain/ nausea control PRN  OOB/ ambulation  Incentive Spirometry  Appreciate ICU level care      Subjective/Objective     Subjective:   Patient seen and examined at bedside, in no acute distress. No acute events overnight. Patient's pain is well controlled. Resolved nausea/vomiting. Some improvement in abdominal pain.     Objective:   Vitals:Blood pressure 128/64, pulse 65, temperature 97.9 °F (36.6 °C), temperature source Oral, resp. rate 14, height 5' 8\" (1.727 m), weight 98.7 kg (217 lb 9.5 oz), SpO2 94%.  Temp (24hrs), Av.6 °F (37.6 °C), Min:97.9 °F (36.6 °C), Max:101.9 °F (38.8 °C)        Intake/Output Summary (Last 24 hours) at 2024 0618  Last data filed at 2024 0400  Gross per 24 hour   Intake 2760.84 ml   Output 1000 ml   Net 1760.84 ml       Invasive Devices       Peripheral Intravenous Line  Duration             Peripheral IV 24 Dorsal (posterior);Right Hand 1 day    Peripheral IV 24 Right Antecubital 1 day              Drain  Duration             External Urinary Catheter <1 day                    Physical Exam:  General: No acute distress, alert and oriented  CV: Well perfused, regular rate and rhythm  Lungs: Normal work of breathing, no increased respiratory effort on 3L NC  Abdomen: Soft, " epigastric tenderness, distended. Incisions clean, dry and intact.  Extremities: No edema, clubbing or cyanosis  Skin: Warm, dry    Lab Results: BMP/CMP:   Lab Results   Component Value Date    SODIUM 141 05/27/2024    K 3.7 05/27/2024     05/27/2024    CO2 26 05/27/2024    BUN 16 05/27/2024    CREATININE 0.95 05/27/2024    CALCIUM 8.0 (L) 05/27/2024    AST 94 (H) 05/27/2024     (H) 05/27/2024    ALKPHOS 101 05/27/2024    EGFR 84 05/27/2024    and CBC:   Lab Results   Component Value Date    WBC 15.88 (H) 05/27/2024    HGB 10.6 (L) 05/27/2024    HCT 32.2 (L) 05/27/2024    MCV 92 05/27/2024     05/27/2024    RBC 3.49 (L) 05/27/2024    MCH 30.4 05/27/2024    MCHC 32.9 05/27/2024    RDW 14.9 05/27/2024    MPV 10.3 05/27/2024     VTE Prophylaxis: Sequential compression device (Venodyne)  and Enoxaparin (Lovenox)    Luly Murphy MD  5/27/2024\

## 2024-05-27 NOTE — ANESTHESIA PREPROCEDURE EVALUATION
Procedure:  ERCP    Relevant Problems   CARDIO   (+) Hyperlipidemia      GI/HEPATIC   (+) Gallstone pancreatitis        Physical Exam    Airway      TM Distance: >3 FB  Neck ROM: full     Dental       Cardiovascular  Cardiovascular exam normal    Pulmonary  Pulmonary exam normal     Other Findings        Anesthesia Plan  ASA Score- 2     Anesthesia Type- general with ASA Monitors.         Additional Monitors:     Airway Plan: ETT.           Plan Factors-Exercise tolerance (METS): >4 METS.    Chart reviewed. EKG reviewed. Imaging results reviewed. Existing labs reviewed. Patient summary reviewed.    Patient is not a current smoker.  Patient did not smoke on day of surgery.    Obstructive sleep apnea risk education given perioperatively.        Induction- intravenous.    Postoperative Plan- Plan for postoperative opioid use. Planned trial extubation    Perioperative Resuscitation Plan - Level 1 - Full Code.       Informed Consent- Anesthetic plan and risks discussed with patient.  I personally reviewed this patient with the CRNA. Discussed and agreed on the Anesthesia Plan with the CRNA..

## 2024-05-27 NOTE — QUICK NOTE
GASTROENTEROLOGY QUICK NOTE:     Overnight, patient's blood cultures resulted back positive for gram-negative rods.  Fortunately, patient remains hemodynamically stable and afebrile since initiation of antibiotics.  However, labs today show increasing WBC count up to 15.88 and total bili also increased to 3.55.  Patient seen and examined at bedside.  Does complain of right upper quadrant abdominal pain which had not been present at time of consultation yesterday.  Due to bacteremia and worsening labs, patient warrants more urgent ERCP for biliary drainage in setting of cholangitis.  Continue on IV Zosyn and monitor hemodynamics, patient to remain n.p.o. with plan for ERCP later this evening.    Grace Baxter DO, PGY-4  [facility] Gastroenterology Fellow

## 2024-05-27 NOTE — QUICK NOTE
Post procedure note:    Patient looks well following his procedure.  Still has some epigastric tenderness, but states that it is better than before his procedure.  Denies any nausea or vomiting.  Still appears to be somewhat distended.  Stable at this time.    Vitals:    05/27/24 1830   BP: 147/92   Pulse: 71   Resp:    Temp:    SpO2: 96%

## 2024-05-28 ENCOUNTER — TELEPHONE (OUTPATIENT)
Dept: SURGERY | Facility: CLINIC | Age: 64
End: 2024-05-28

## 2024-05-28 LAB
ALBUMIN SERPL BCP-MCNC: 3.1 G/DL (ref 3.5–5)
ALBUMIN SERPL BCP-MCNC: 3.2 G/DL (ref 3.5–5)
ALP SERPL-CCNC: 118 U/L (ref 34–104)
ALP SERPL-CCNC: 125 U/L (ref 34–104)
ALT SERPL W P-5'-P-CCNC: 106 U/L (ref 7–52)
ALT SERPL W P-5'-P-CCNC: 96 U/L (ref 7–52)
ANION GAP SERPL CALCULATED.3IONS-SCNC: 10 MMOL/L (ref 4–13)
AST SERPL W P-5'-P-CCNC: 44 U/L (ref 13–39)
AST SERPL W P-5'-P-CCNC: 53 U/L (ref 13–39)
BASOPHILS # BLD AUTO: 0.05 THOUSANDS/ÂΜL (ref 0–0.1)
BASOPHILS NFR BLD AUTO: 0 % (ref 0–1)
BILIRUB DIRECT SERPL-MCNC: 0.83 MG/DL (ref 0–0.2)
BILIRUB DIRECT SERPL-MCNC: 0.88 MG/DL (ref 0–0.2)
BILIRUB SERPL-MCNC: 1.44 MG/DL (ref 0.2–1)
BILIRUB SERPL-MCNC: 1.69 MG/DL (ref 0.2–1)
BUN SERPL-MCNC: 18 MG/DL (ref 5–25)
CA-I BLD-SCNC: 1.11 MMOL/L (ref 1.12–1.32)
CALCIUM SERPL-MCNC: 8.6 MG/DL (ref 8.4–10.2)
CHLORIDE SERPL-SCNC: 103 MMOL/L (ref 96–108)
CO2 SERPL-SCNC: 25 MMOL/L (ref 21–32)
CREAT SERPL-MCNC: 0.81 MG/DL (ref 0.6–1.3)
EOSINOPHIL # BLD AUTO: 0.01 THOUSAND/ÂΜL (ref 0–0.61)
EOSINOPHIL NFR BLD AUTO: 0 % (ref 0–6)
ERYTHROCYTE [DISTWIDTH] IN BLOOD BY AUTOMATED COUNT: 14.8 % (ref 11.6–15.1)
GFR SERPL CREATININE-BSD FRML MDRD: 94 ML/MIN/1.73SQ M
GLUCOSE SERPL-MCNC: 140 MG/DL (ref 65–140)
HCT VFR BLD AUTO: 35.3 % (ref 36.5–49.3)
HGB BLD-MCNC: 11.5 G/DL (ref 12–17)
IMM GRANULOCYTES # BLD AUTO: 0.18 THOUSAND/UL (ref 0–0.2)
IMM GRANULOCYTES NFR BLD AUTO: 1 % (ref 0–2)
LYMPHOCYTES # BLD AUTO: 1.06 THOUSANDS/ÂΜL (ref 0.6–4.47)
LYMPHOCYTES NFR BLD AUTO: 6 % (ref 14–44)
MAGNESIUM SERPL-MCNC: 2.3 MG/DL (ref 1.9–2.7)
MCH RBC QN AUTO: 30.3 PG (ref 26.8–34.3)
MCHC RBC AUTO-ENTMCNC: 32.6 G/DL (ref 31.4–37.4)
MCV RBC AUTO: 93 FL (ref 82–98)
MONOCYTES # BLD AUTO: 0.64 THOUSAND/ÂΜL (ref 0.17–1.22)
MONOCYTES NFR BLD AUTO: 4 % (ref 4–12)
NEUTROPHILS # BLD AUTO: 15.44 THOUSANDS/ÂΜL (ref 1.85–7.62)
NEUTS SEG NFR BLD AUTO: 89 % (ref 43–75)
NRBC BLD AUTO-RTO: 0 /100 WBCS
PHOSPHATE SERPL-MCNC: 3.5 MG/DL (ref 2.3–4.1)
PLATELET # BLD AUTO: 317 THOUSANDS/UL (ref 149–390)
PMV BLD AUTO: 10.7 FL (ref 8.9–12.7)
POTASSIUM SERPL-SCNC: 4.4 MMOL/L (ref 3.5–5.3)
PROT SERPL-MCNC: 6.1 G/DL (ref 6.4–8.4)
PROT SERPL-MCNC: 6.3 G/DL (ref 6.4–8.4)
RBC # BLD AUTO: 3.8 MILLION/UL (ref 3.88–5.62)
SODIUM SERPL-SCNC: 138 MMOL/L (ref 135–147)
WBC # BLD AUTO: 17.38 THOUSAND/UL (ref 4.31–10.16)

## 2024-05-28 PROCEDURE — 99232 SBSQ HOSP IP/OBS MODERATE 35: CPT | Performed by: SURGERY

## 2024-05-28 PROCEDURE — 82330 ASSAY OF CALCIUM: CPT | Performed by: PHYSICIAN ASSISTANT

## 2024-05-28 PROCEDURE — 80076 HEPATIC FUNCTION PANEL: CPT | Performed by: STUDENT IN AN ORGANIZED HEALTH CARE EDUCATION/TRAINING PROGRAM

## 2024-05-28 PROCEDURE — C9113 INJ PANTOPRAZOLE SODIUM, VIA: HCPCS

## 2024-05-28 PROCEDURE — 87040 BLOOD CULTURE FOR BACTERIA: CPT | Performed by: PHYSICIAN ASSISTANT

## 2024-05-28 PROCEDURE — 97166 OT EVAL MOD COMPLEX 45 MIN: CPT

## 2024-05-28 PROCEDURE — 97162 PT EVAL MOD COMPLEX 30 MIN: CPT

## 2024-05-28 PROCEDURE — 84100 ASSAY OF PHOSPHORUS: CPT | Performed by: PHYSICIAN ASSISTANT

## 2024-05-28 PROCEDURE — 99232 SBSQ HOSP IP/OBS MODERATE 35: CPT | Performed by: INTERNAL MEDICINE

## 2024-05-28 PROCEDURE — 99232 SBSQ HOSP IP/OBS MODERATE 35: CPT | Performed by: EMERGENCY MEDICINE

## 2024-05-28 PROCEDURE — 88304 TISSUE EXAM BY PATHOLOGIST: CPT | Performed by: PATHOLOGY

## 2024-05-28 PROCEDURE — 85025 COMPLETE CBC W/AUTO DIFF WBC: CPT | Performed by: PHYSICIAN ASSISTANT

## 2024-05-28 PROCEDURE — 80048 BASIC METABOLIC PNL TOTAL CA: CPT | Performed by: PHYSICIAN ASSISTANT

## 2024-05-28 PROCEDURE — 83735 ASSAY OF MAGNESIUM: CPT | Performed by: PHYSICIAN ASSISTANT

## 2024-05-28 RX ORDER — CALCIUM GLUCONATE 20 MG/ML
2 INJECTION, SOLUTION INTRAVENOUS ONCE
Status: COMPLETED | OUTPATIENT
Start: 2024-05-28 | End: 2024-05-29

## 2024-05-28 RX ORDER — ACETAMINOPHEN 325 MG/1
650 TABLET ORAL EVERY 6 HOURS PRN
Status: DISCONTINUED | OUTPATIENT
Start: 2024-05-28 | End: 2024-05-31 | Stop reason: HOSPADM

## 2024-05-28 RX ORDER — OXYCODONE HYDROCHLORIDE 10 MG/1
10 TABLET ORAL EVERY 4 HOURS PRN
Status: DISCONTINUED | OUTPATIENT
Start: 2024-05-28 | End: 2024-05-31 | Stop reason: HOSPADM

## 2024-05-28 RX ORDER — HYDROMORPHONE HCL IN WATER/PF 6 MG/30 ML
0.2 PATIENT CONTROLLED ANALGESIA SYRINGE INTRAVENOUS
Status: DISCONTINUED | OUTPATIENT
Start: 2024-05-28 | End: 2024-05-30

## 2024-05-28 RX ORDER — OXYCODONE HYDROCHLORIDE 5 MG/1
5 TABLET ORAL EVERY 6 HOURS PRN
Status: DISCONTINUED | OUTPATIENT
Start: 2024-05-28 | End: 2024-05-31 | Stop reason: HOSPADM

## 2024-05-28 RX ADMIN — SODIUM CHLORIDE, SODIUM GLUCONATE, SODIUM ACETATE, POTASSIUM CHLORIDE, MAGNESIUM CHLORIDE, SODIUM PHOSPHATE, DIBASIC, AND POTASSIUM PHOSPHATE 125 ML/HR: .53; .5; .37; .037; .03; .012; .00082 INJECTION, SOLUTION INTRAVENOUS at 10:37

## 2024-05-28 RX ADMIN — ACETAMINOPHEN 1000 MG: 10 INJECTION INTRAVENOUS at 03:54

## 2024-05-28 RX ADMIN — CHLORHEXIDINE GLUCONATE 15 ML: 1.2 SOLUTION ORAL at 21:02

## 2024-05-28 RX ADMIN — GABAPENTIN 300 MG: 250 SOLUTION ORAL at 21:12

## 2024-05-28 RX ADMIN — CALCIUM GLUCONATE 2 G: 20 INJECTION, SOLUTION INTRAVENOUS at 13:10

## 2024-05-28 RX ADMIN — ENOXAPARIN SODIUM 40 MG: 40 INJECTION SUBCUTANEOUS at 09:01

## 2024-05-28 RX ADMIN — SODIUM CHLORIDE, SODIUM GLUCONATE, SODIUM ACETATE, POTASSIUM CHLORIDE, MAGNESIUM CHLORIDE, SODIUM PHOSPHATE, DIBASIC, AND POTASSIUM PHOSPHATE 125 ML/HR: .53; .5; .37; .037; .03; .012; .00082 INJECTION, SOLUTION INTRAVENOUS at 02:34

## 2024-05-28 RX ADMIN — CEFTRIAXONE 2000 MG: 1 INJECTION, POWDER, FOR SOLUTION INTRAMUSCULAR; INTRAVENOUS at 14:37

## 2024-05-28 RX ADMIN — ACETAMINOPHEN 1000 MG: 10 INJECTION INTRAVENOUS at 10:36

## 2024-05-28 RX ADMIN — PANTOPRAZOLE SODIUM 40 MG: 40 INJECTION, POWDER, FOR SOLUTION INTRAVENOUS at 09:01

## 2024-05-28 RX ADMIN — PIPERACILLIN SODIUM AND TAZOBACTAM SODIUM 4.5 G: 36; 4.5 INJECTION, POWDER, LYOPHILIZED, FOR SOLUTION INTRAVENOUS at 05:57

## 2024-05-28 RX ADMIN — CHLORHEXIDINE GLUCONATE 15 ML: 1.2 SOLUTION ORAL at 09:01

## 2024-05-28 NOTE — PROGRESS NOTES
[facility]  Progress Note: Critical Care  Name: Don Valdez 63 y.o. male I MRN: [id number]  Unit/Bed#: [facility] 518-01 I Date of Admission: 5/26/2024   Date of Service: 5/28/2024 I Hospital Day: 2    Assessment & Plan     Neuro/Psych/ENT diagnosis: P/A/D.   Plan:  Pain controlled with:  Scheduled: Tylenol IV q6h  PRN: dilaudid q3h   Regulate sleep/wake cycle as able  Gabapentin 300 mg qHS  Delirium monitoring and precautions  CAM-ICU daily  Trend neuro exam  Cardiac/Vascular diagnosis: HTN.   Plan:  Cardiac infusions: None  MAP goal > 65  Follow rhythm on telemetry  Labetalol PRN for SBP >160  Pulmonary/Respiratory/Thoracic diagnosis: Acute hypoxic respiratory insufficiency.   Plan:  Wean O2 as able  SpO2 goal >92%  Pulmonary toileting   Encourage IS  GI/Hepatic diagnosis: Gallstone pancreatitis. S/P cholecystectomy 5/23/24. Retained gallstone in CBD.   Plan:  Trend abd exam and bowel function  Bowel regimen: PRN  Appreciate GI and general sx consults  Trend LFTs  5/27 ERCP: Sludge and debris were removed. CBD stent was placed. Stent was placed in the pancreatic duct.   Nephro/ diagnosis: None.   Plan:  Baseline creat: 0.9-1.0  Trend UOP and BUN/creat  Strict I and O  F/E/N: None.   Plan:  Fluid/Diuretic plan: Isolyte @ 125 mL/hour  Nutrition/diet plan: clear liquid diet  Replenish electrolytes with goals: K >4.0, Mag >2.0, and Phos >3.0  Heme/onc diagnosis: None.  Plan:  Trend hgb and plts  Transfuse as needed for goal hgb >7.0  Endo diagnosis: None.  Plan:  Glycemic control plan: PRN  Goal blood glucose 140-180  ID diagnosis: GNR bacteremia.   Plan:  Abx ordered: Zosyn  Day # 3  De-escalate to ceftriaxone  Trend temps and WBC count  Maintain normothermia  Cultures:   5/26 blood cx x2: E. coli  5/26 blood cx x2: DNR  Repeat cultures 5/28  MSK/Skin diagnosis: None.   Plan:  Mobility goal: OOB  PT and OT when appropriate  Frequent turning and pressure off-loading  Local wound  care as needed    Disposition: Med Surg    ICU Core Measures     A: Assess, Prevent, and Manage Pain Has pain been assessed? Yes  Need for changes to pain regimen? No   B: Both SAT/SAT  N/A   C: Choice of Sedation RASS Goal: N/A patient not on sedation  Need for changes to sedation or analgesia regimen? NA   D: Delirium CAM-ICU: Negative   E: Early Mobility  Plan for early mobility? Yes   F: Family Engagement Plan for family engagement today? Yes       Antibiotic Review: de-escalate today to ceftriaxone      Prophylaxis:  VTE VTE covered by:  enoxaparin, Subcutaneous, 40 mg at 05/27/24 0807       Stress Ulcer  covered bypantoprazole (PROTONIX) injection 40 mg [[id number]]        Significant 24hr Events     24hr events: Underwent ERCP without issue.      Subjective     Review of Systems   Respiratory:  Negative for chest tightness and shortness of breath.    Cardiovascular:  Negative for chest pain.   Gastrointestinal:  Positive for abdominal distention and abdominal pain.   All other systems reviewed and are negative.       Objective                            Vitals I/O      Most Recent Min/Max in 24hrs   Temp 97.9 °F (36.6 °C) Temp  Min: 96.6 °F (35.9 °C)  Max: 98.2 °F (36.8 °C)   Pulse 71 Pulse  Min: 65  Max: 79   Resp 17 Resp  Min: 14  Max: 30   /66 BP  Min: 119/66  Max: 160/75   O2 Sat 95 % SpO2  Min: 92 %  Max: 98 %     Respiratory:  Nasal Cannula O2 Flow Rate (L/min): 2 L/min       Intake/Output Summary (Last 24 hours) at 5/28/2024 0151  Last data filed at 5/27/2024 2318  Gross per 24 hour   Intake 3385.83 ml   Output 3376 ml   Net 9.83 ml       Diet Clear Liquid    Invasive Monitoring           Physical Exam   Physical Exam  Vitals reviewed.   Skin:     General: Skin is warm and dry.   Cardiovascular:      Rate and Rhythm: Normal rate and regular rhythm.      Heart sounds: No murmur heard.     No friction rub.   Abdominal: General: There is distension.     Tenderness: There is abdominal tenderness.    Constitutional:       General: He is not in acute distress.     Appearance: He is well-developed.   Pulmonary:      Effort: Pulmonary effort is normal. No respiratory distress.      Breath sounds: Normal breath sounds.   Neurological:      General: No focal deficit present.      Mental Status: He is alert and oriented to person, place and time.      Motor: gross motor function is at baseline for patient.            Diagnostic Studies      None.      Medications:  Scheduled PRN   acetaminophen, 1,000 mg, Q6H KATHY  chlorhexidine, 15 mL, Q12H KATHY  enoxaparin, 40 mg, Q24H KATHY  gabapentin, 300 mg, HS  pantoprazole, 40 mg, Q24H KATHY  piperacillin-tazobactam, 4.5 g, Q8H      HYDROmorphone, 0.5 mg, Q3H PRN  HYDROmorphone, 0.2 mg, Q3H PRN  labetalol, 10 mg, Q6H PRN  ondansetron, 4 mg, Q4H PRN       Continuous    multi-electrolyte, 125 mL/hr, Last Rate: 125 mL/hr (05/27/24 1823)         Labs:    CBC    Recent Labs     05/26/24  0210 05/27/24  0511   WBC 11.00* 15.88*   HGB 13.5 10.6*   HCT 40.5 32.2*    229   BANDSPCT  --  35*     BMP    Recent Labs     05/26/24  0210 05/27/24  0511   SODIUM 138 141   K 3.8 3.7    106   CO2 26 26   AGAP 11 9   BUN 14 16   CREATININE 1.06 0.95   CALCIUM 9.6 8.0*       Coags    Recent Labs     05/26/24  0501   INR 1.40*   PTT 30        Additional Electrolytes  Recent Labs     05/27/24  0511   MG 1.9   PHOS 2.3          Blood Gas    No recent results  No recent results LFTs  Recent Labs     05/26/24  0210 05/27/24  0511   * 132*   * 94*   ALKPHOS 141* 101   ALB 4.3 3.0*   TBILI 2.46* 3.55*       Infectious  Recent Labs     05/26/24  0501   PROCALCITONI 0.74*     Glucose  Recent Labs     05/26/24  0210 05/27/24  0511   GLUC 162* 110         Candy Lambert PA-C

## 2024-05-28 NOTE — PHYSICAL THERAPY NOTE
Physical Therapy Evaluation     Patient's Name: Dno Valdez    Admitting Diagnosis  Cholangitis [K83.09]  Gallstone pancreatitis [K85.10]  Calculus of gallbladder and bile duct with acute and chronic cholecystitis with obstruction [K80.67]    Problem List  Patient Active Problem List   Diagnosis    Bilateral carpal tunnel syndrome    Gallstone pancreatitis    Hyperlipidemia    Calculus of gallbladder and bile duct with acute and chronic cholecystitis with obstruction    S/P cholecystectomy       Past Medical History  Past Medical History:   Diagnosis Date    GERD (gastroesophageal reflux disease)     High cholesterol     Restless leg syndrome        Past Surgical History  Past Surgical History:   Procedure Laterality Date    CARPAL TUNNEL RELEASE Bilateral     CHOLECYSTECTOMY LAPAROSCOPIC N/A 5/23/2024    Procedure: CHOLECYSTECTOMY LAPAROSCOPIC W/ INTRAOP CHOLANGIOGRAM;  Surgeon: Don Bethea MD;  Location: [facility];  Service: General    ORIF FINGER / THUMB FRACTURE            05/28/24 0937   PT Last Visit   PT Visit Date 05/28/24   Note Type   Note type Evaluation   Pain Assessment   Pain Assessment Tool 0-10   Pain Score No Pain   Restrictions/Precautions   Weight Bearing Precautions Per Order No   Other Precautions Fall Risk;Telemetry;Multiple lines   Home Living   Type of Home House   Home Layout Two level;Stairs to enter with rails;Access;Bed/bath upstairs  (4 vs 1 TRINA, FFOS to 2nd)   Bathroom Shower/Tub Walk-in shower   Bathroom Toilet Standard   Bathroom Accessibility Accessible   Home Equipment Walker;Cane  (not used PTA)   Prior Function   Level of [place] Independent with functional mobility;Independent with IADLS;Independent with ADLs   Lives With Alone   Receives Help From Family;Friend(s);Neighbor  (sister planning on coming to stay with patient on D/C for a few days)   IADLs Independent with driving;Independent with medication management;Independent with meal prep   Falls in the  last 6 months 0   Vocational Part time employment   General   Family/Caregiver Present No   Cognition   Overall Cognitive Status WFL   Arousal/Participation Alert   Attention Within functional limits   Orientation Level Oriented X4   Memory Within functional limits   Following Commands Follows one step commands without difficulty   Comments Patient pleasant and cooperative   Subjective   Subjective Patient agreeable to PT eval   RUE Assessment   RUE Assessment WFL   LUE Assessment   LUE Assessment WFL   RLE Assessment   RLE Assessment WFL   LLE Assessment   LLE Assessment WFL   Bed Mobility   Additional Comments OOB in bathroom with RN on arrival   Transfers   Sit to Stand 6  Modified independent   Additional items Armrests   Stand to Sit 6  Modified independent   Additional items Armrests   Toilet transfer 5  Supervision   Additional items Increased time required;Standard toilet   Additional Comments no AD   Ambulation/Elevation   Gait pattern WNL   Gait Assistance 5  Supervision   Assistive Device None   Distance 225'   Balance   Static Sitting Good   Dynamic Sitting Fair +   Static Standing Fair +   Dynamic Standing Fair   Ambulatory Fair   Endurance Deficit   Endurance Deficit No   Activity Tolerance   Activity Tolerance Patient tolerated treatment well   Medical Staff Made Aware OT   Nurse Made Aware RN cleared   Assessment   Prognosis Good   Assessment Pt is a 63 y.o. male seen for a moderate complexity PT evaluation due to Ongoing medical management for primary dx, s/p surgical intervention. Patient is s/p admit to [facility] on 5/26/2024 for Cholangitis (K83.09)  Gallstone pancreatitis (K85.10)  Calculus of gallbladder and bile duct with acute and chronic cholecystitis with obstruction (K80.67). Patient  has a past medical history of GERD (gastroesophageal reflux disease), High cholesterol, and Restless leg syndrome..     PT now consulted to assess functional mobility and needs for safe d/c  planning. pt independent with functional mobility, independent ADLs, and independent IADLs. Personal factors affecting status include steps to enter home, steps to negotiate within home, lives alone, and medical status.     Currently pt requires modified independent for functional transfers with no AD ; supervision for ambulation with no AD. Pt presents functioning at or near baseline level of function. Patient states no concerns with functional mobility at present. Patient is encouraged to continue ambulating with staff assist as able in order to maintain current LOF.     The patient's AM-PAC Basic Mobility Inpatient Short Form Raw Score Is 22. PT is currently recommending no post acute rehab needs on d/c from hospital. Due to patient current status, plan to sign off formal inpatient PT services at this time. Please re-consult should current status change.   Barriers to Discharge None   Goals   Patient Goals to walk   PT Treatment Day 0   Plan   Treatment/Interventions   (d/c PT)   PT Frequency   (d/c PT)   Discharge Recommendation   Rehab Resource Intensity Level, PT No post-acute rehabilitation needs   AM-PAC Basic Mobility Inpatient   Turning in Flat Bed Without Bedrails 4   Lying on Back to Sitting on Edge of Flat Bed Without Bedrails 4   Moving Bed to Chair 4   Standing Up From Chair Using Arms 4   Walk in Room 3   Climb 3-5 Stairs With Railing 3   Basic Mobility Inpatient Raw Score 22   Basic Mobility Standardized Score 47.4   [facility] Highest Level Of Mobility   -HLM Goal 7: Walk 25 feet or more   -HLM Achieved 7: Walk 25 feet or more   Modified Gregoria Scale   Modified Garland Scale 2   End of Consult   Patient Position at End of Consult All needs within reach;Bedside chair         Kristi Reid, PT, DPT

## 2024-05-28 NOTE — UTILIZATION REVIEW
Initial Clinical Review    TRANSFER FROM [facility] ED    Admission: Date/Time/Statement:   Admission Orders (From admission, onward)       Ordered        05/26/24 0907  Inpatient Admission  Once                          Orders Placed This Encounter   Procedures    Inpatient Admission     Standing Status:   Standing     Number of Occurrences:   1     Order Specific Question:   Level of Care     Answer:   Critical Care [15]     Order Specific Question:   Estimated length of stay     Answer:   More than 2 Midnights     Order Specific Question:   Certification     Answer:   I certify that inpatient services are medically necessary for this patient for a duration of greater than two midnights. See H&P and MD Progress Notes for additional information about the patient's course of treatment.     ED Arrival Information       Expected   5/26/2024     Arrival   5/26/2024 08:26    Acuity   Emergent              Means of arrival   Ambulance    Escorted by   [facility]    Service   Surgery-General    Admission type   Emergency              Arrival complaint   abdominal pain             Chief Complaint   Patient presents with    Abdominal Pain     Surgery transfer from [facility] s/p gallbladder surgery with retained stones and pancreatitis       Initial Presentation: 63 y.o. male initially presented to ED at  [facility] with fever, tachycardia and tachypnea.  Required  O2  in  ED for sats  89-91 %.    Had emesis on arrival, increasing  O2 requirement and  shortly thereafter  aspiration.  Had  lap cass  on   5/23 with IOC, C/B  retained stone  in CBD.  Labs reveal elevated  LFT's.  Imaging  reveals  cholangitis/pancreatitis.  Admit  IP  Critical Care with Gallstone pancreatitis, Cholangitis  and plan is  GI consult,  ERCP,  CONRADO, monitor labs,  blood cultures, NGT and NPO.     GI consult  Remain on  CONRADO.  Trend   LFT's.  Blood  cultures +  bacteremia.    Plan  ERCP  5/27.  Continue serial abdominal exams.  Remains  NPO.       Date:   5/27   Day 2:   Continue  strict  I & O.  Continue  IVF.  Continue  CONRADO/IV  tylenol.  Needs  PT/OT.   Remains NPO.      ERCP  MPRESSION:  The common bile duct and pancreatic duct were deeply cannulated after 1 attempt by employing a double guidewire technique. Cannulation was difficult due to location within diverticula.  Major papilla sphincterotomy was performed.  A sweep was performed in the proximal common bile duct. Sludge and debris were removed.  8.5 Fr x 7 cm duodenal bend stent was placed in the common bile duct  5 Fr x 3 cm straight stent was placed in the pancreatic duct    Still with epigastric tenderness     Abdomen distended.         ED Triage Vitals   Temperature Pulse Respirations Blood Pressure SpO2   05/26/24 0852 05/26/24 0838 05/26/24 0838 05/26/24 0838 05/26/24 0838   (!) 101.9 °F (38.8 °C) (!) 122 22 (!) 175/84 94 %      Temp Source Heart Rate Source Patient Position - Orthostatic VS BP Location FiO2 (%)   05/26/24 0852 05/26/24 1150 05/26/24 1150 05/26/24 1150 --   Oral Monitor Lying Left arm       Pain Score       05/26/24 0838       10 - Worst Possible Pain          Wt Readings from Last 1 Encounters:   05/28/24 101 kg (222 lb 10.6 oz)     Additional Vital Signs:   05/27/24 0200 -- 70 14 120/73 92 95 % -- -- -- -- --   05/27/24 0100 -- 66 14 125/71 93 95 % -- -- -- -- --   05/27/24 0000 97.9 °F (36.6 °C) 65 14 112/64 83 96 % -- -- -- -- --   05/26/24 2300 -- 66 15 113/65 84 96 % -- -- -- -- --   05/26/24 2200 -- 68 14 107/66 83 94 % -- -- -- -- --   05/26/24 2000 98.1 °F (36.7 °C) 69 15 102/57 74 96 % 36 4 L/min Nasal cannula -- Lying   05/26/24 1800 97.9 °F (36.6 °C) 75 18 104/60 78 96 % 36 4 L/min Nasal cannula -- Lying   05/26/24 1700 -- 78 15 100/60 74 96 % -- -- -- -- Lying   05/26/24 1600 -- 91 28 Abnormal  123/68 91 94 % 44 6 L/min Nasal cannula -- Lying   05/26/24 1500 -- 88 16 103/56 74 93 % -- -- -- -- --   05/26/24 1400 -- 95 17 110/61 80 95 % -- -- -- -- Lying    05/26/24 1300 98.8 °F (37.1 °C) 100 19 119/58 81 93 % 44 6 L/min Nasal cannula -- Lying   05/26/24 1200 -- 105 29 Abnormal  120/69 89 94 % 44 6 L/min Nasal cannula -- Lying   05/26/24 1150 100.8 °F (38.2 °C) Abnormal  109 Abnormal  47 Abnormal  128/71 94 94 % 44 6 L/min Nasal cannula -- Lying   05/26/24 1100 -- 105 24 Abnormal  120/57 82 91 % 44 6 L/min Nasal cannula -- --   05/26/24 1050 101.7 °F (38.7 °C) Abnormal  -- -- -- -- -- -- -- -- -- --   05/26/24 1030 -- 112 Abnormal  28 Abnormal  135/63 90 92 % -- -- -- -- --   05/26/24 1000 -- 107 Abnormal  26 Abnormal  142/66 95 91 % 44 6 L/min -- -- --   05/26/24 0930 -- 108 Abnormal  27 Abnormal  145/69 99 92 % 44 6 L/min -- -- --   05/26/24 0900 -- 110 Abnormal  36 Abnormal  154/72 103 91 % 44 6 L/min Nasal cannula -- --   05/26/24 0855 -- -- -- -- -- -- -- -- Nasal cannula -- --       Pertinent Labs/Diagnostic Test Results:     Ct abd/pelvis  ( 5/26)   Status post cholecystectomy. Mild fat stranding/fluid within the cholecystectomy bed likely postoperative.   2.  Persistent fat stranding in the peripancreatic and right upper quadrant which may be due to pancreatitis.   3.  Mild thickening of the duodenum and hepatic flexure which may be due to reactive duodenitis/colitis.   4.  Colonic diverticulosis without evidence of diverticulitis.   FL ERCP biliary and pancreatic    (Results Pending)     Results from last 7 days   Lab Units 05/26/24  0501   SARS-COV-2  Negative     Results from last 7 days   Lab Units 05/28/24  0525 05/27/24  0511 05/26/24  0210 05/24/24  0545 05/23/24  0438 05/22/24  0507 05/21/24  1103   WBC Thousand/uL 17.38* 15.88* 11.00* 12.71* 9.77   < > 16.63*   HEMOGLOBIN g/dL 11.5* 10.6* 13.5 11.7* 12.7   < > 14.2   HEMATOCRIT % 35.3* 32.2* 40.5 34.9* 37.8   < > 42.6   PLATELETS Thousands/uL 317 229 291 240 238   < > 265   TOTAL NEUT ABS Thousands/µL 15.44*  --  8.01*  --   --   --  12.18*   BANDS PCT %  --  35*  --   --   --   --   --     < > =  values in this interval not displayed.         Results from last 7 days   Lab Units 05/28/24  0525 05/27/24  0511 05/26/24  0210 05/24/24  0545 05/23/24  0438 05/22/24  0507   SODIUM mmol/L 138 141 138 138 140 136   POTASSIUM mmol/L 4.4 3.7 3.8 3.8 3.9 3.8   CHLORIDE mmol/L 103 106 101 106 107 106   CO2 mmol/L 25 26 26 24 25 24   ANION GAP mmol/L 10 9 11 8 8 6   BUN mg/dL 18 16 14 15 10 11   CREATININE mg/dL 0.81 0.95 1.06 0.99 0.93 0.95   EGFR ml/min/1.73sq m 94 84 74 80 87 84   CALCIUM mg/dL 8.6 8.0* 9.6 9.3 9.2 8.9   CALCIUM, IONIZED mmol/L 1.11*  --   --   --   --   --    MAGNESIUM mg/dL 2.3 1.9  --   --   --  2.0   PHOSPHORUS mg/dL 3.5 2.3  --   --   --  2.9     Results from last 7 days   Lab Units 05/28/24  0904 05/28/24  0525 05/27/24  0511 05/26/24  0210 05/24/24  0545   AST U/L 44* 53* 94* 233* 31   ALT U/L 96* 106* 132* 187* 42   ALK PHOS U/L 118* 125* 101 141* 62   TOTAL PROTEIN g/dL 6.1* 6.3* 5.7* 8.1 6.5   ALBUMIN g/dL 3.1* 3.2* 3.0* 4.3 3.6   TOTAL BILIRUBIN mg/dL 1.44* 1.69* 3.55* 2.46* 0.47   BILIRUBIN DIRECT mg/dL 0.83* 0.88*  --   --   --      Results from last 7 days   Lab Units 05/27/24  1823   POC GLUCOSE mg/dl 118     Results from last 7 days   Lab Units 05/28/24  0525 05/27/24  0511 05/26/24  0210 05/24/24  0545 05/23/24  0438 05/22/24  0507 05/21/24  1103   GLUCOSE RANDOM mg/dL 140 110 162* 165* 125 119 209*           Results from last 7 days   Lab Units 05/26/24  0616 05/26/24  0425 05/26/24  0210   HS TNI 0HR ng/L  --   --  6   HS TNI 2HR ng/L  --  8  --    HSTNI D2 ng/L  --  2  --    HS TNI 4HR ng/L 6  --   --    HSTNI D4 ng/L 0  --   --          Results from last 7 days   Lab Units 05/26/24  0501   PROTIME seconds 17.3*   INR  1.40*   PTT seconds 30         Results from last 7 days   Lab Units 05/26/24  0501   PROCALCITONIN ng/ml 0.74*     Results from last 7 days   Lab Units 05/26/24  0501   LACTIC ACID mmol/L 0.7             Results from last 7 days   Lab Units 05/26/24  0210   BNP  pg/mL 76                     Results from last 7 days   Lab Units 05/26/24  0210 05/24/24  0545 05/22/24  0507   LIPASE u/L 133* 115* 216*                 Results from last 7 days   Lab Units 05/26/24  0228   CLARITY UA  Clear   COLOR UA  Nataly*   SPEC GRAV UA  1.020   PH UA  7.5   GLUCOSE UA mg/dl Trace*   KETONES UA mg/dl Negative   BLOOD UA  Negative   PROTEIN UA mg/dl Trace*   NITRITE UA  Negative   BILIRUBIN UA  Negative   UROBILINOGEN UA E.U./dl 4.0*   LEUKOCYTES UA  Negative   WBC UA /hpf 0-1   RBC UA /hpf 1-2   BACTERIA UA /hpf Occasional   EPITHELIAL CELLS WET PREP /hpf None Seen     Results from last 7 days   Lab Units 05/26/24  0501   INFLUENZA A PCR  Negative   INFLUENZA B PCR  Negative   RSV PCR  Negative                             Results from last 7 days   Lab Units 05/26/24  0933 05/26/24  0501 05/26/24  0444   BLOOD CULTURE  Gram Negative Jared Enteric Like*  Gram Negative Jared Enteric Like* Gram Negative Jared Enteric Like* Escherichia coli*   GRAM STAIN RESULT  Gram negative rods*  Gram negative rods* Gram negative rods* Gram negative rods*                   ED Treatment:   Medication Administration from 05/26/2024 0519 to 05/26/2024 1135         Date/Time Order Dose Route Action Comments     05/26/2024 0910 EDT multi-electrolyte (PLASMALYTE-A/ISOLYTE-S PH 7.4) IV solution 125 mL/hr Intravenous New Bag --     05/26/2024 0934 EDT heparin (porcine) subcutaneous injection 5,000 Units 5,000 Units Subcutaneous Given --     05/26/2024 0908 EDT ondansetron (ZOFRAN) injection 4 mg 4 mg Intravenous Given --     05/26/2024 0933 EDT acetaminophen (TYLENOL) tablet 975 mg 975 mg Oral Given --     05/26/2024 0909 EDT HYDROmorphone HCl (DILAUDID) injection 0.2 mg 0.2 mg Intravenous Given --     05/26/2024 1049 EDT piperacillin-tazobactam (ZOSYN) 4.5 g in sodium chloride 0.9 % 100 mL IV LOADING DOSE 0 g Intravenous Stopped --     05/26/2024 0948 EDT piperacillin-tazobactam (ZOSYN) 4.5 g in sodium chloride 0.9 % 100  mL IV LOADING DOSE 4.5 g Intravenous New Bag --     05/26/2024 0909 EDT pantoprazole (PROTONIX) injection 40 mg 40 mg Intravenous Given --            Admitting Diagnosis: Cholangitis [K83.09]  Gallstone pancreatitis [K85.10]  Calculus of gallbladder and bile duct with acute and chronic cholecystitis with obstruction [K80.67]  Age/Sex: 63 y.o. male  Admission Orders:  Scheduled Medications:  acetaminophen, 1,000 mg, Intravenous, Q6H KATHY  chlorhexidine, 15 mL, Mouth/Throat, Q12H KATHY  enoxaparin, 40 mg, Subcutaneous, Q24H KATHY  gabapentin, 300 mg, Oral, HS  pantoprazole, 40 mg, Intravenous, Q24H KATHY  piperacillin-tazobactam, 4.5 g, Intravenous, Q8H      Continuous IV Infusions:  multi-electrolyte, 125 mL/hr, Intravenous, Continuous      PRN Meds:  HYDROmorphone, 0.5 mg, Intravenous, Q3H PRN  HYDROmorphone, 0.2 mg, Intravenous, Q3H PRN  labetalol, 10 mg, Intravenous, Q6H PRN  ondansetron, 4 mg, Intravenous, Q4H PRN        IP CONSULT TO GASTROENTEROLOGY  IP CONSULT TO CASE MANAGEMENT  IP CONSULT TO CASE MANAGEMENT    Network Utilization Review Department  ATTENTION: Please call with any questions or concerns to [phone] and carefully listen to the prompts so that you are directed to the right person. All voicemails are confidential.   For Discharge needs, contact Care Management DC Support Team at [phone] opt. [phone]  Send all requests for admission clinical reviews, approved or denied determinations and any other requests to dedicated fax number below belonging to the campus where the patient is receiving treatment. List of dedicated fax numbers for the Facilities:  FACILITY NAME UR FAX NUMBER   ADMISSION DENIALS (Administrative/Medical Necessity) [phone]   DISCHARGE SUPPORT TEAM (NETWORK) [phone]   PARENT CHILD HEALTH (Maternity/NICU/Pediatrics) [phone]   [facility] [phone]   [facility] [phone]   [facility]  [phone]   [facility] [phone]   [facility] [phone]   [facility] [phone]   [facility] [phone]   [facility] [phone]   [facility] [phone]   [facility] [phone]   [facility] [phone]   [facility] [phone]

## 2024-05-28 NOTE — PROGRESS NOTES
"Progress Note - General Surgery   Don Valdez 63 y.o. male MRN: [id number]  Unit/Bed#: [facility] 518-01 Encounter: [id number]    Assessment:  63yoM s/p 5/23/24 laparoscopic cholecystectomy w IOC c/b retained stone in CBD, presents as a transfer from [facility] w elevated LFTs and bilirubin, E. Coli bacteremia, cholangitis  - 5/27 s/p ERCP w CBD and PD stents placed    Vitals stable, afebrile, 2L NC  WBC 17.4 from 15.9  Cr 0.81    UOP 3900    Plan:  -ok for low fat diet  -zosyn, follow repeat blood cx in setting of e coli bacteremia  -IVF until tolerating PO  -appreciate GI recs  -lovenox  -pain control  -encourage ambulation, PT/OT  -incentive spirometry  -appreciate ICU care, stable for dc out of the unit    Subjective/Objective   Subjective:   Patient doing well, reports some continued abdominal discomfort but much improved, tolerating clears without nausea or emesis, passing flatus and had a BM yesterday, voiding, using IS.    Objective:     Blood pressure 137/76, pulse 67, temperature 97.9 °F (36.6 °C), temperature source Oral, resp. rate 17, height 5' 8\" (1.727 m), weight 98.7 kg (217 lb 9.5 oz), SpO2 96%.,Body mass index is 33.09 kg/m².      Intake/Output Summary (Last 24 hours) at 5/28/2024 0602  Last data filed at 5/27/2024 2318  Gross per 24 hour   Intake 2685.83 ml   Output 3301 ml   Net -615.17 ml       Invasive Devices       Peripheral Intravenous Line  Duration             Peripheral IV 05/26/24 Dorsal (posterior);Right Hand 2 days    Peripheral IV 05/26/24 Right Antecubital 2 days                    Physical Exam:   General: NAD  Skin: Warm, dry, anicteric  HEENT: Normocephalic, atraumatic  CV: RRR, no m/r/g  Pulm: CTA b/l, no inc WOB, 2L NC  Abd: Soft, mildly distended, minimally tender  MSK: Symmetric, no edema, no tenderness, no deformity  Neuro: AOx3, GCS 15     Lab, Imaging and other studies:I have personally reviewed pertinent lab results.  , CBC:   Lab Results   Component Value Date    WBC 17.38 (H) " 05/28/2024    HGB 11.5 (L) 05/28/2024    HCT 35.3 (L) 05/28/2024    MCV 93 05/28/2024     05/28/2024    RBC 3.80 (L) 05/28/2024    MCH 30.3 05/28/2024    MCHC 32.6 05/28/2024    RDW 14.8 05/28/2024    MPV 10.7 05/28/2024    NRBC 0 05/28/2024   , CMP:   Lab Results   Component Value Date    SODIUM 138 05/28/2024    K 4.4 05/28/2024     05/28/2024    CO2 25 05/28/2024    BUN 18 05/28/2024    CREATININE 0.81 05/28/2024    CALCIUM 8.6 05/28/2024    EGFR 94 05/28/2024     VTE Pharmacologic Prophylaxis: Enoxaparin (Lovenox)  VTE Mechanical Prophylaxis: sequential compression device

## 2024-05-28 NOTE — OCCUPATIONAL THERAPY NOTE
"    Occupational Therapy Evaluation     Patient Name: Don Valdez  Today's Date: 5/28/2024  Problem List  Principal Problem:    Gallstone pancreatitis  Active Problems:    S/P cholecystectomy    Past Medical History  Past Medical History:   Diagnosis Date    GERD (gastroesophageal reflux disease)     High cholesterol     Restless leg syndrome      Past Surgical History  Past Surgical History:   Procedure Laterality Date    CARPAL TUNNEL RELEASE Bilateral     CHOLECYSTECTOMY LAPAROSCOPIC N/A 5/23/2024    Procedure: CHOLECYSTECTOMY LAPAROSCOPIC W/ INTRAOP CHOLANGIOGRAM;  Surgeon: Don Bethea MD;  Location: [facility];  Service: General    ORIF FINGER / THUMB FRACTURE             05/28/24 0936   OT Last Visit   OT Visit Date 05/28/24   Note Type   Note type Evaluation   Pain Assessment   Pain Assessment Tool 0-10   Pain Score No Pain   Restrictions/Precautions   Weight Bearing Precautions Per Order No   Other Precautions Multiple lines;Telemetry;Fall Risk   Home Living   Type of Home House   Home Layout Two level;Bed/bath upstairs  (4 vs 1 TRINA)   Bathroom Shower/Tub Walk-in shower   Bathroom Toilet Standard   Home Equipment Walker;Cane  (did not use PTA)   Prior Function   Level of [place] Independent with ADLs;Independent with IADLS   Lives With Alone   Receives Help From Family;Friend(s);Neighbor   IADLs Independent with driving;Independent with meal prep;Independent with medication management   Falls in the last 6 months 0   Vocational Part time employment   Lifestyle   Autonomy PTA, pt reports being I with ADLs, IADLs, fnxl mobility, (+)    Reciprocal Relationships Sister, neighbors, friend. Sister will be staying with pt for a few days upon d/c   Service to Others Previously worked full time as a . Now works part time (2x/wk) at a [employer]   Intrinsic Gratification BINGO   Subjective   Subjective \"I wanna go for a walk!\"   ADL   Where Assessed Chair   Eating Assistance 7  " Independent   Grooming Assistance 7  Independent   UB Bathing Assistance 7  Independent   LB Bathing Assistance 5  Supervision/Setup   UB Dressing Assistance 7  Independent   LB Dressing Assistance 5  Supervision/Setup   Toileting Assistance  5  Supervision/Setup   Bed Mobility   Supine to Sit Unable to assess   Sit to Supine Unable to assess   Additional Comments Pt OOB in bathroom upon arrival   Transfers   Sit to Stand 6  Modified independent   Additional items Armrests   Stand to Sit 6  Modified independent   Additional items Armrests   Toilet transfer 5  Supervision   Additional items Increased time required   Additional Comments transfers w/o AD   Functional Mobility   Functional Mobility 5  Supervision   Additional Comments no AD   Balance   Static Sitting Good   Dynamic Sitting Fair +   Static Standing Fair +   Dynamic Standing Fair   Ambulatory Fair   Activity Tolerance   Activity Tolerance Patient tolerated treatment well   Medical Staff Made Aware PT Kristi   Nurse Made Aware RN clearance for session   RUE Assessment   RUE Assessment WFL   LUE Assessment   LUE Assessment WFL   Vision-Basic Assessment   Current Vision Wears glasses all the time   Cognition   Overall Cognitive Status WFL   Arousal/Participation Alert;Responsive;Cooperative   Attention Attends with cues to redirect   Orientation Level Oriented X4   Memory Within functional limits   Following Commands Follows one step commands without difficulty   Comments Pt pleasant and cooperative t/o session   Assessment   Assessment Pt is a 63 y.o. male admitted to [facility] on 5/26/2024 w/ Gallstone pancreatitis. Pt with recent cholecystectomy (5/23). Pt now s/p ERCP.  has a past medical history of GERD, High cholesterol, CTS, Hyperlipidemia, Calculus of gallbladder and bile duct with cholecystitis,. and Restless leg syndrome. Pt with active OT orders and  OOB to chair  orders. Pt seen as a co-evaluation with PT due to the patient's co-morbidities, clinically  unstable presentation/clinical complexity, and present impairments. As per pt report, pta, resides alone in a 2STH, 1 vs 4STE. Pt was I w/  ADLS and IADLS, (+) drove. Upon evaluation, pt currently MI/I with transfers and S mobility. Pt currently requires I eating, I grooming, I UB ADLs, S LB ADLs, and S toileting. Pt reports his sister is coming to stay with him for a few days upon d/c and can assist as needed. No questions or concerns at this time. From OT standpoint, recommendation would be home with social support. No further acute OT needs. D/C OT. Please re-consult if needed. Thank you. Pt was left after session with all current needs met. The patient's raw score on the -PAC Daily Activity Inpatient Short Form is 21. A raw score of greater than or equal to 19 suggests the patient may benefit from discharge to home. Please refer to the recommendation of the Occupational Therapist for safe discharge planning.   Goals   Patient Goals to go for a walk   Plan   OT Frequency Eval only   Discharge Recommendation   Rehab Resource Intensity Level, OT No post-acute rehabilitation needs   AM-PAC Daily Activity Inpatient   Lower Body Dressing 3   Bathing 3   Toileting 3   Upper Body Dressing 4   Grooming 4   Eating 4   Daily Activity Raw Score 21   Daily Activity Standardized Score (Calc for Raw Score >=11) 44.27   AM-PAC Applied Cognition Inpatient   Following a Speech/Presentation 4   Understanding Ordinary Conversation 4   Taking Medications 4   Remembering Where Things Are Placed or Put Away 4   Remembering List of 4-5 Errands 4   Taking Care of Complicated Tasks 3   Applied Cognition Raw Score 23   Applied Cognition Standardized Score 53.08        Bailee Dudley MS, OTR/L

## 2024-05-28 NOTE — PROGRESS NOTES
[facility] - Inpatient Progress Note    PATIENT INFORMATION      Don Valdez 63 y.o. male MRN: [id number]  Unit/Bed#: [facility] 518-01 Encounter: [id number]    ASSESSMENT & PLAN   Mr. Kevin Valdez is a 63-year-old male with a history of restless leg syndrome and recent admission for gallstone pancreatitis status post laparoscopic cholecystectomy and positive IOC on 5/23 who now presents with recurrent epigastric pain, fevers, and elevated liver chemistries for which GI is now consulted.     Gallstone Pancreatitis  S/P Recent Cholecystectomy with Positive IOC  SIRS Syndrome; Possible Cholangitis   Elevated Liver Chemistries   5/27: Worsening WBC count to 15.88, T bili to 3.55. New RUQ pain.   S/p ERCP 5/27: Common bile duct and pancreatic duct were deeply cannulated after 1 attempt by employing a double guidewire technique. Cannulation was difficult due to location within diverticula.  Major papula sphincterotomy was performed.  Sweep was performed in the Proximal common bile duct.  Sludge and debris were removed.  Duodenal bend stent was placed in the CBD. Straight stent was placed in the pancreatic duct.    This am, pt feeling well, no complaints, tolerating PO diet. Mild abdominal discomfort only on palpation.     Plan:  -trend LFTs  -Cont abx for 7 days total   -Recommend KUB in 6 days to check for presence of stent   -Repeat ERCP in 6 to 8 weeks for removal of PD stent if still present and extraction of remaining choledocholithiasis  -Advance diet as tolerated  -cont incentive spirometry   -Monitor fever curve and WBC      SUBJECTIVE     Patient seen and evaluated at bedside.  Patient feeling well this a.m.  Does not report any abdominal discomfort.  Tolerated p.o. liquids last night and this morning.  Has not had any episodes of emesis.  No recent bowel movements.  Urinating well.  Denies fevers, chills, lightheadedness, dizziness, hematochezia, melena, hematemesis, pruritus,  "abdominal pain, shortness of breath, or chest pain.    MEDICATIONS & ALLERGIES       Medications:     Medications Prior to Admission:     gabapentin (NEURONTIN) 300 mg capsule    oxyCODONE (ROXICODONE) 5 immediate release tablet  Current Facility-Administered Medications   Medication Dose Route Frequency    acetaminophen (Ofirmev) injection 1,000 mg  1,000 mg Intravenous Q6H KATHY    chlorhexidine (PERIDEX) 0.12 % oral rinse 15 mL  15 mL Mouth/Throat Q12H KATHY    enoxaparin (LOVENOX) subcutaneous injection 40 mg  40 mg Subcutaneous Q24H KATHY    gabapentin (NEURONTIN) oral solution 300 mg  300 mg Oral HS    HYDROmorphone (DILAUDID) injection 0.5 mg  0.5 mg Intravenous Q3H PRN    HYDROmorphone HCl (DILAUDID) injection 0.2 mg  0.2 mg Intravenous Q3H PRN    labetalol (NORMODYNE) injection 10 mg  10 mg Intravenous Q6H PRN    multi-electrolyte (PLASMALYTE-A/ISOLYTE-S PH 7.4) IV solution  125 mL/hr Intravenous Continuous    ondansetron (ZOFRAN) injection 4 mg  4 mg Intravenous Q4H PRN    pantoprazole (PROTONIX) injection 40 mg  40 mg Intravenous Q24H KATHY    piperacillin-tazobactam (ZOSYN) 4.5 g in sodium chloride 0.9 % 100 mL IVPB (EXTENDED INFUSION)  4.5 g Intravenous Q8H       Allergies:   Allergies   Allergen Reactions    Tetracycline        PHYSICAL EXAM     Objective   Blood pressure 154/85, pulse 84, temperature 97.9 °F (36.6 °C), temperature source Oral, resp. rate 17, height 5' 8\" (1.727 m), weight 101 kg (222 lb 10.6 oz), SpO2 95%. Body mass index is 33.86 kg/m².    Intake/Output Summary (Last 24 hours) at 5/28/2024 0807  Last data filed at 5/28/2024 0600  Gross per 24 hour   Intake 3323.33 ml   Output 3701 ml   Net -377.67 ml       General Appearance:   Alert, cooperative, no distress   HEENT:   Normocephalic, atraumatic, anicteric     Neck:   Supple, symmetrical, trachea midline   Lungs:   Equal chest rise, respirations unlabored; +crackles at bases   Heart:   Regular rate and rhythm   Abdomen:   Soft, +mildly " tender to palpation non-distended; normal bowel sounds; no masses, no organomegaly    Rectal:   Deferred    Extremities:   No cyanosis, clubbing or edema    Neuro:   Moves all 4 extremities    Skin:   No jaundice, rashes, or lesions      ADDITIONAL DATA     Lab Results:     Results from last 7 days   Lab Units 05/28/24  0525   WBC Thousand/uL 17.38*   HEMOGLOBIN g/dL 11.5*   HEMATOCRIT % 35.3*   PLATELETS Thousands/uL 317   SEGS PCT % 89*   LYMPHO PCT % 6*   MONO PCT % 4   EOS PCT % 0     Results from last 7 days   Lab Units 05/28/24  0525 05/27/24  0511   POTASSIUM mmol/L 4.4 3.7   CHLORIDE mmol/L 103 106   CO2 mmol/L 25 26   BUN mg/dL 18 16   CREATININE mg/dL 0.81 0.95   CALCIUM mg/dL 8.6 8.0*   ALK PHOS U/L  --  101   ALT U/L  --  132*   AST U/L  --  94*     Results from last 7 days   Lab Units 05/26/24  0501   INR  1.40*       Imaging:    ERCP    Result Date: 5/28/2024  Narrative: Table formatting from the original result was not included. [facility] Operating Room [address] [address] [phone] DATE OF SERVICE: 5/27/24 PHYSICIAN(S): Attending: Sean Wong MD Fellow: No Staff Documented INDICATION: Cholangitis, Choledocholithiasis, Gram-negative bacteremia, Gallstone pancreatitis POST-OP DIAGNOSIS: See the impression below. PREPROCEDURE: Informed consent was obtained for the procedure, including sedation.  Risks of perforation, hemorrhage, adverse drug reaction and aspiration were discussed. The patient was placed in the left lateral decubitus position. Patient was explained about the risks and benefits of the procedure. Risks including but not limited to bleeding, infection, and perforation were explained in detail. Also explained about less than 100% sensitivity with the exam and other alternatives. PROCEDURE: ERCP DETAILS OF PROCEDURE: Patient was taken to the procedure room where a time out was performed to confirm correct patient and correct procedure. The patient  "underwent general anesthesia, which was administered by an anesthesia professional. The patient's blood pressure, heart rate, level of consciousness, respirations, oxygen, ECG and ETCO2 were monitored throughout the procedure. The scope was introduced through the mouth. Clinical intention was achieved. The patient experienced no blood loss. The procedure was not difficult. The patient tolerated the procedure well. There were no apparent adverse events. ANESTHESIA INFORMATION: ASA: II Anesthesia Type: General MEDICATIONS: multi-electrolyte (PLASMALYTE-A/ISOLYTE-S PH 7.4) IV solution 4,000 mL*  *From user-documented volume iohexol (OMNIPAQUE) 350 MG/ML injection (SINGLE-DOSE) 100 mL 5 mL (Totals for administrations occurring from 1504 to 1726 on 05/27/24) FINDINGS: Limited views of the esophagus, stomach, duodenum and major papilla appeared normal. There were two large periampullary diverticula. The ampulla was identified within the rim of the distal diverticula. The common bile duct and pancreatic duct were deeply cannulated after 1 attempt using a traction sphincterotome with 260 cm x 0.035\" straight guidewire by employing a double guidewire technique. Cannulation was difficult due to location within diverticula. I personally reviewed and interpreted the fluoroscopy images.  There was a filling defect in the distal common bile duct consistent with stone. Medium-sized major papilla sphincterotomy was performed using a sphincterotome. No bleeding was noted at the procedure site. A sweep was performed in the proximal common bile duct using a 12 mm balloon. Sludge and debris were removed. 8.5 Fr x 7 cm duodenal bend plastic stent was placed in the common bile duct 5 Fr x 3 cm straight plastic stent was placed in the pancreatic duct for prevention of post ERCP pancreatitis SPECIMENS: * No specimens in log *      Impression: The common bile duct and pancreatic duct were deeply cannulated after 1 attempt by employing a " double guidewire technique. Cannulation was difficult due to location within diverticula. Major papilla sphincterotomy was performed. A sweep was performed in the proximal common bile duct. Sludge and debris were removed. 8.5 Fr x 7 cm duodenal bend stent was placed in the common bile duct 5 Fr x 3 cm straight stent was placed in the pancreatic duct RECOMMENDATION: Follow LFTs Continue antibiotics for cholangitis Repeat ERCP in 6-8 weeks for removal of pd stent (if still present) and extraction of remaining choledocholithiasis. Sean Wong MD Suggested CPT codes: 96722, 43274x2     PE Study with CT abdomen & pelvis with contrast    Result Date: 5/26/2024  Narrative: CT PULMONARY ANGIOGRAM OF THE CHEST AND CT ABDOMEN AND PELVIS WITH INTRAVENOUS CONTRAST INDICATION: post op abdominal surgery, epigastric pain, tachy and hypoxic. COMPARISON: CT of the abdomen pelvis on May 21, 2024. TECHNIQUE: CT examination of the chest, abdomen and pelvis was performed. Thin section CT angiographic technique was used in the chest in order to evaluate for pulmonary embolus and coronal 3D MIP postprocessing was performed on the acquisition scanner. Multiplanar 2D reformatted images were created from the source data. This examination, like all CT scans performed in the [facility], was performed utilizing techniques to minimize radiation dose exposure, including the use of iterative reconstruction and automated exposure control. Radiation dose length product (DLP) for this visit: 1374 mGy-cm IV Contrast: 100 mL of iohexol (OMNIPAQUE) Enteric Contrast: Not administered. FINDINGS: CHEST PULMONARY ARTERIAL TREE: No pulmonary embolus is seen. LUNGS: Emphysematous/cystic changes of the lungs. Bibasilar scarring/atelectasis. Otherwise no focal consolidation. The central airways are patent. PLEURA: Unremarkable. HEART/AORTA: Heart is unremarkable for patient's age. No thoracic aortic aneurysm. MEDIASTINUM AND BAY:  Unremarkable. CHEST WALL AND LOWER NECK: Unremarkable. ABDOMEN LIVER/BILIARY TREE: Unremarkable. GALLBLADDER: Post cholecystectomy. There is mild fat stranding/fluid in the gallbladder fossa. SPLEEN: Unremarkable. PANCREAS: Mild fat stranding about the pancreas. ADRENAL GLANDS: Unremarkable. KIDNEYS/URETERS: No hydronephrosis or urinary tract calculi. Subcentimeter hypoattenuating renal lesion(s), too small to characterize but statistically likely benign, which do not warrant follow-up (Radiology June 2019). STOMACH AND BOWEL: Mild thickening and fat stranding about the duodenum and hepatic flexure which may be reactive. 2.4 cm duodenal diverticulum. No bowel obstruction. Mild thickening of the sigmoid colon similar to prior examination, likely chronic. Colonic diverticulosis without evidence of diverticulitis. APPENDIX: Normal appendix. ABDOMINOPELVIC CAVITY: Mild fat stranding within the upper mesentery. No ascites. No pneumoperitoneum. No lymphadenopathy. VESSELS: Moderate atherosclerotic calcifications. PELVIS REPRODUCTIVE ORGANS: Unremarkable for patient's age. URINARY BLADDER: Unremarkable. ABDOMINAL WALL/INGUINAL REGIONS: Small bilateral fat-containing inguinal hernias BONES: No acute fracture or suspicious osseous lesion. Spinal degenerative changes.     Impression: 1.  Status post cholecystectomy. Mild fat stranding/fluid within the cholecystectomy bed likely postoperative. 2.  Persistent fat stranding in the peripancreatic and right upper quadrant which may be due to pancreatitis. 3.  Mild thickening of the duodenum and hepatic flexure which may be due to reactive duodenitis/colitis. 4.  Colonic diverticulosis without evidence of diverticulitis. Workstation performed: [id number]     XR chest portable    Result Date: 5/23/2024  Narrative: XR CHEST PORTABLE INDICATION: pre-op. COMPARISON: None FINDINGS: Clear lungs. No pneumothorax or pleural effusion. The heart is enlarged. Bones are unremarkable for age.  Normal upper abdomen.     Impression: No acute cardiopulmonary disease. Workstation performed: [id number]     CT abdomen pelvis with contrast    Result Date: 5/21/2024  Narrative: CT ABDOMEN AND PELVIS WITH IV CONTRAST INDICATION: abdominal pain for a few days. COMPARISON: None. TECHNIQUE: CT examination of the abdomen and pelvis was performed. Multiplanar 2D reformatted images were created from the source data. This examination, like all CT scans performed in the [facility], was performed utilizing techniques to minimize radiation dose exposure, including the use of iterative reconstruction and automated exposure control. Radiation dose length product (DLP) for this visit: 893.15 mGy-cm IV Contrast: 100 mL of iohexol (OMNIPAQUE) Enteric Contrast: Not administered. FINDINGS: ABDOMEN LOWER CHEST: No clinically significant abnormality in the visualized lower chest. LIVER/BILIARY TREE: The liver is enlarged, 21 cm craniocaudad. Subcentimeter left hepatic lobe cyst. No biliary dilatation. GALLBLADDER: Cholelithiasis without findings of acute cholecystitis. SPLEEN: Unremarkable. PANCREAS: Mesenteric haziness in the vicinity of pancreas. Correlate to exclude symptoms of pancreatitis. Incidental adjacent duodenal diverticulum. Otherwise unremarkable pancreas. ADRENAL GLANDS: Unremarkable. KIDNEYS/URETERS: No hydronephrosis or urinary tract calculi. Subcentimeter hypoattenuating renal lesion(s), too small to characterize but statistically likely benign, which do not warrant follow-up (Radiology June 2019). STOMACH AND BOWEL: No evidence of bowel obstruction or gross inflammatory process. Sigmoid diverticulosis. No diverticulitis. APPENDIX: Normal. ABDOMINOPELVIC CAVITY: No ascites. No pneumoperitoneum. No lymphadenopathy. VESSELS: Unremarkable for patient's age. PELVIS REPRODUCTIVE ORGANS: Unremarkable for patient's age. URINARY BLADDER: Unremarkable. ABDOMINAL WALL/INGUINAL REGIONS: Unremarkable. BONES: No  acute fracture or suspicious osseous lesion.     Impression: Mild peripancreatic mesenteric fat haziness, nonspecific. Correlate to exclude symptoms of pancreatitis. Otherwise unremarkable pancreas. Hepatomegaly. Sigmoid diverticulosis. No evidence of diverticulitis. Minor findings, as per the body of the report. Workstation performed: [id number]       EKG, Pathology, and Other Studies Reviewed on Admission:   EKG: Reviewed      Counseling / Coordination of Care Time: 30 total mins spent n consult. Greater than 50% of total time spent on patient counseling and coordination of care.    Carola Rivas DO  PGY-1  Internal Medicine  [facility]    ...............................................................................................................................................  ** Please Note: This note is constructed using a voice recognition dictation system. **

## 2024-05-28 NOTE — CASE MANAGEMENT
Case Management Assessment & Discharge Planning Note    Patient name Don Valdez  Location [facility] 518/[facility] 518-01 MRN [id number]  : 1960 Date 2024       Current Admission Date: 2024  Current Admission Diagnosis:Gallstone pancreatitis   Patient Active Problem List    Diagnosis Date Noted Date Diagnosed    S/P cholecystectomy 2024     Hyperlipidemia 2024     Calculus of gallbladder and bile duct with acute and chronic cholecystitis with obstruction 2024     Gallstone pancreatitis 2024     Bilateral carpal tunnel syndrome        LOS (days): 2  Geometric Mean LOS (GMLOS) (days):   Days to GMLOS:     OBJECTIVE:  PATIENT READMITTED TO HOSPITAL  Risk of Unplanned Readmission Score: 14.23         Current admission status: Inpatient       Preferred Pharmacy:   [facility] [address]  [place] [address]  Phone: [phone] Fax: [phone]    Primary Care Provider: Arley Martinez MD    Primary Insurance: [facility] BLUE [facility]  Secondary Insurance:     ASSESSMENT:  Active Health Care Proxies       RAMÍREZARELISNE [facility] Care Representative - Sister   Primary Phone: [phone] (Home)                 Advance Directives  Does patient have a Health Care POA?: No  Was patient offered paperwork?: Yes  Does patient have Advance Directives?: No  Was patient offered paperwork?: Yes  Primary Contact: sisterShana (Sister)   [phone]         Readmission Root Cause  30 Day Readmission: Yes  Who directed you to return to the hospital?: Self  Did you understand whom to contact if you had questions or problems?: Yes  Did you get your prescriptions before you left the hospital?: Yes  Were you able to get your prescriptions filled when you left the hospital?: Yes  Did you take your medications as prescribed?: Yes  Were you able to get to your follow-up appointments?: No  Reason:: Readmitted prior to appointment  During  previous admission, was a post-acute recommendation made?: No  Patient was readmitted due to: pain, s/p cholecystectomy with retained common duct stones, admitted with cholangitis  Action Plan: surgical plan    Patient Information  Admitted from:: Home  Mental Status: Alert  During Assessment patient was accompanied by: Not accompanied during assessment  Assessment information provided by:: Patient  Primary Caregiver: Self  Support Systems: Family members (sister)  County of Residence: [place]  What city do you live in[place]  Home entry access options. Select all that apply.: Stairs  Number of steps to enter home.: 3  Do the steps have railings?: Yes  Type of Current Residence: [address] [place] home  Upon entering residence, is there a bedroom on the main floor (no further steps)?: No  A bedroom is located on the following floor levels of residence (select all that apply):: 2nd Floor  Upon entering residence, is there a bathroom on the main floor (no further steps)?: Yes  Number of steps to 2nd floor from main floor: One Flight  Living Arrangements: Lives Alone (pt lives alone but reports his sister will be coming to stay with him following this hospitalization)  Is patient a ?: No    Activities of Daily Living Prior to Admission  Functional Status: Independent  Completes ADLs independently?: Yes  Ambulates independently?: Yes  Does patient use assisted devices?: No  Does patient currently own DME?: No  Does patient have a history of Outpatient Therapy (PT/OT)?: No  Does the patient have a history of Short-Term Rehab?: No  Does patient have a history of HHC?: No  Does patient currently have HHC?: No         Patient Information Continued  Income Source: Employed  Does patient have prescription coverage?: Yes  Does patient receive dialysis treatments?: No  Does patient have a history of substance abuse?: No  Does patient have a history of Mental Health Diagnosis?: No         Means of Transportation  Means of  Transport to Appts:: Drives Self      Social Determinants of Health (SDOH)      Flowsheet Row Most Recent Value   Housing Stability    In the last 12 months, was there a time when you were not able to pay the mortgage or rent on time? N   In the past 12 months, how many times have you moved where you were living? 1   At any time in the past 12 months, were you homeless or living in a shelter (including now)? N   Transportation Needs    In the past 12 months, has lack of transportation kept you from medical appointments or from getting medications? no   In the past 12 months, has lack of transportation kept you from meetings, work, or from getting things needed for daily living? No   Food Insecurity    Within the past 12 months, you worried that your food would run out before you got the money to buy more. Never true   Within the past 12 months, the food you bought just didn't last and you didn't have money to get more. Never true   Utilities    In the past 12 months has the electric, gas, oil, or water company threatened to shut off services in your home? No            DISCHARGE DETAILS:    Discharge planning discussed with:: patient  Freedom of Choice: Yes  Comments - Freedom of Choice: Pt was IPTA.  Pt denied any DCP needs.  He reports his sister will be staying with him after d/c.  No DCP needs per PT/OT.  CM contacted family/caregiver?: No- see comments (pt reports he has been in contact with his sister)  Were Treatment Team discharge recommendations reviewed with patient/caregiver?: Yes  Did patient/caregiver verbalize understanding of patient care needs?: Yes  Were patient/caregiver advised of the risks associated with not following Treatment Team discharge recommendations?: Yes                             Treatment Team Recommendation: Home  Discharge Destination Plan:: Home  Transport at Discharge : Family                                      Additional Comments: Pt currently in CCU, will likely be  transfered out to med-surg floor later today.

## 2024-05-29 LAB
ANION GAP SERPL CALCULATED.3IONS-SCNC: 11 MMOL/L (ref 4–13)
BACTERIA BLD CULT: ABNORMAL
BUN SERPL-MCNC: 19 MG/DL (ref 5–25)
CA-I BLD-SCNC: 1.13 MMOL/L (ref 1.12–1.32)
CALCIUM SERPL-MCNC: 8.6 MG/DL (ref 8.4–10.2)
CHLORIDE SERPL-SCNC: 106 MMOL/L (ref 96–108)
CO2 SERPL-SCNC: 24 MMOL/L (ref 21–32)
CREAT SERPL-MCNC: 0.97 MG/DL (ref 0.6–1.3)
E COLI DNA BLD POS QL NAA+NON-PROBE: DETECTED
ERYTHROCYTE [DISTWIDTH] IN BLOOD BY AUTOMATED COUNT: 14.6 % (ref 11.6–15.1)
GFR SERPL CREATININE-BSD FRML MDRD: 82 ML/MIN/1.73SQ M
GLUCOSE SERPL-MCNC: 137 MG/DL (ref 65–140)
GRAM STN SPEC: ABNORMAL
HCT VFR BLD AUTO: 35.8 % (ref 36.5–49.3)
HGB BLD-MCNC: 12.1 G/DL (ref 12–17)
MCH RBC QN AUTO: 30.3 PG (ref 26.8–34.3)
MCHC RBC AUTO-ENTMCNC: 33.8 G/DL (ref 31.4–37.4)
MCV RBC AUTO: 90 FL (ref 82–98)
PLATELET # BLD AUTO: 343 THOUSANDS/UL (ref 149–390)
PMV BLD AUTO: 10.4 FL (ref 8.9–12.7)
POTASSIUM SERPL-SCNC: 4.1 MMOL/L (ref 3.5–5.3)
RBC # BLD AUTO: 4 MILLION/UL (ref 3.88–5.62)
SODIUM SERPL-SCNC: 141 MMOL/L (ref 135–147)
WBC # BLD AUTO: 14.49 THOUSAND/UL (ref 4.31–10.16)

## 2024-05-29 PROCEDURE — 80048 BASIC METABOLIC PNL TOTAL CA: CPT

## 2024-05-29 PROCEDURE — 82330 ASSAY OF CALCIUM: CPT

## 2024-05-29 PROCEDURE — 85027 COMPLETE CBC AUTOMATED: CPT

## 2024-05-29 PROCEDURE — 99232 SBSQ HOSP IP/OBS MODERATE 35: CPT | Performed by: SURGERY

## 2024-05-29 RX ADMIN — GABAPENTIN 300 MG: 250 SOLUTION ORAL at 20:21

## 2024-05-29 RX ADMIN — ENOXAPARIN SODIUM 40 MG: 40 INJECTION SUBCUTANEOUS at 07:39

## 2024-05-29 RX ADMIN — CEFTRIAXONE 2000 MG: 1 INJECTION, POWDER, FOR SOLUTION INTRAMUSCULAR; INTRAVENOUS at 13:46

## 2024-05-29 RX ADMIN — ACETAMINOPHEN 650 MG: 325 TABLET, FILM COATED ORAL at 20:20

## 2024-05-29 RX ADMIN — CHLORHEXIDINE GLUCONATE 15 ML: 1.2 SOLUTION ORAL at 20:21

## 2024-05-29 RX ADMIN — CHLORHEXIDINE GLUCONATE 15 ML: 1.2 SOLUTION ORAL at 07:39

## 2024-05-29 NOTE — PROGRESS NOTES
"Progress Note - General Surgery   Don Valdez 63 y.o. male MRN: [id number]  Unit/Bed#: [facility] 910-01 Encounter: [id number]    Assessment:  63yoM s/p 5/23/24 laparoscopic cholecystectomy w IOC c/b retained stone in CBD, presents as a transfer from [facility] w elevated LFTs and bilirubin, E. Coli bacteremia, cholangitis  - 5/27 s/p ERCP w CBD and PD stents placed    Vitals stable, afebrile, room air  WBC 14.5 from 17.4  5/20 8 repeat blood cultures in process, previous growing E. coli  Cr 0.97    Plan:  -low fat diet  -On ceftriaxone, follow repeat blood cx in setting of e coli bacteremia, will de-escalate as susceptibilities return  -appreciate GI recs  -lovenox  -pain control  -encourage ambulation  -No rehab needs per PT/OT  -incentive spirometry  -Dispo planning    Subjective/Objective   Subjective:   Patient doing very well, tolerating diet without nausea or emesis, passing flatus and having bowel movements, voiding without issues, ambulating, using incentive spirometry.    Objective:     Blood pressure 148/90, pulse 76, temperature 98.2 °F (36.8 °C), temperature source Tympanic, resp. rate 18, height 5' 8\" (1.727 m), weight 101 kg (222 lb 10.6 oz), SpO2 95%.,Body mass index is 33.86 kg/m².      Intake/Output Summary (Last 24 hours) at 5/29/2024 0758  Last data filed at 5/28/2024 1600  Gross per 24 hour   Intake 775 ml   Output 125 ml   Net 650 ml       Invasive Devices       Peripheral Intravenous Line  Duration             Peripheral IV 05/26/24 Dorsal (posterior);Right Hand 3 days    Peripheral IV 05/26/24 Right Antecubital 3 days                    Physical Exam:   General: NAD  Skin: Warm, dry, anicteric  HEENT: Normocephalic, atraumatic  CV: RRR, no m/r/g  Pulm: CTA b/l, no inc WOB  Abd: Soft, ND/NT  MSK: Symmetric, no edema, no tenderness, no deformity  Neuro: AOx3, GCS 15     Lab, Imaging and other studies:I have personally reviewed pertinent lab results.  , CBC:   Lab Results   Component Value Date    WBC " 14.49 (H) 05/29/2024    HGB 12.1 05/29/2024    HCT 35.8 (L) 05/29/2024    MCV 90 05/29/2024     05/29/2024    RBC 4.00 05/29/2024    MCH 30.3 05/29/2024    MCHC 33.8 05/29/2024    RDW 14.6 05/29/2024    MPV 10.4 05/29/2024   , CMP:   Lab Results   Component Value Date    SODIUM 141 05/29/2024    K 4.1 05/29/2024     05/29/2024    CO2 24 05/29/2024    BUN 19 05/29/2024    CREATININE 0.97 05/29/2024    CALCIUM 8.6 05/29/2024    AST 44 (H) 05/28/2024    ALT 96 (H) 05/28/2024    ALKPHOS 118 (H) 05/28/2024    EGFR 82 05/29/2024     VTE Pharmacologic Prophylaxis: Enoxaparin (Lovenox)  VTE Mechanical Prophylaxis: sequential compression device

## 2024-05-29 NOTE — UTILIZATION REVIEW
NOTIFICATION OF ADMISSION DISCHARGE   This is a Notification of Discharge from [facility]. Please be advised that this patient has been discharge from our facility. Below you will find the admission and discharge date and time including the patient’s disposition.   UTILIZATION REVIEW CONTACT:  Lester Dixon  Utilization   Network Utilization Review Department  Phone: [phone] x carefully listen to the prompts. All voicemails are confidential.  Email: [email or web address]     ADMISSION INFORMATION  PRESENTATION DATE: 5/21/2024 10:28 AM  OBERVATION ADMISSION DATE:   INPATIENT ADMISSION DATE: 5/22/24 10:41 AM   DISCHARGE DATE: 5/24/2024 11:15 AM   DISPOSITION:Discharge transferred to a designated disaster alternate care    [facility] Utilization Review Department  ATTENTION: Please call with any questions or concerns to [phone] and carefully listen to the prompts so that you are directed to the right person. All voicemails are confidential.   For Discharge needs, contact Care Management DC Support Team at [phone] opt. 2  Send all requests for admission clinical reviews, approved or denied determinations and any other requests to dedicated fax number below belonging to the campus where the patient is receiving treatment. List of dedicated fax numbers for the Facilities:  FACILITY NAME UR FAX NUMBER   ADMISSION DENIALS (Administrative/Medical Necessity) [phone]   DISCHARGE SUPPORT TEAM ([facility]) [phone]   PARENT CHILD HEALTH (Maternity/NICU/Pediatrics) [phone]   [facility] [phone]   [facility] [phone]   [facility] [phone]   [facility] [phone]   [facility] [phone]   [facility] [phone]   [facility] [phone]   [facility]  [facility] [phone]   [facility] [phone]   [facility] [phone]   [facility] [phone]   [facility] [phone]

## 2024-05-29 NOTE — PLAN OF CARE
Problem: PAIN - ADULT  Goal: Verbalizes/displays adequate comfort level or baseline comfort level  Description: Interventions:  - Encourage patient to monitor pain and request assistance  - Assess pain using appropriate pain scale  - Administer analgesics based on type and severity of pain and evaluate response  - Implement non-pharmacological measures as appropriate and evaluate response  - Consider cultural and social influences on pain and pain management  - Notify physician/advanced practitioner if interventions unsuccessful or patient reports new pain  Outcome: Progressing     Problem: INFECTION - ADULT  Goal: Absence or prevention of progression during hospitalization  Description: INTERVENTIONS:  - Assess and monitor for signs and symptoms of infection  - Monitor lab/diagnostic results  - Monitor all insertion sites, i.e. indwelling lines, tubes, and drains  - Monitor endotracheal if appropriate and nasal secretions for changes in amount and color  - [place] appropriate cooling/warming therapies per order  - Administer medications as ordered  - Instruct and encourage patient and family to use good hand hygiene technique  - Identify and instruct in appropriate isolation precautions for identified infection/condition  Outcome: Progressing

## 2024-05-30 LAB
ALBUMIN SERPL BCP-MCNC: 3.6 G/DL (ref 3.5–5)
ALP SERPL-CCNC: 133 U/L (ref 34–104)
ALT SERPL W P-5'-P-CCNC: 101 U/L (ref 7–52)
ANION GAP SERPL CALCULATED.3IONS-SCNC: 9 MMOL/L (ref 4–13)
AST SERPL W P-5'-P-CCNC: 61 U/L (ref 13–39)
BILIRUB SERPL-MCNC: 0.89 MG/DL (ref 0.2–1)
BUN SERPL-MCNC: 16 MG/DL (ref 5–25)
CALCIUM SERPL-MCNC: 9.1 MG/DL (ref 8.4–10.2)
CHLORIDE SERPL-SCNC: 104 MMOL/L (ref 96–108)
CO2 SERPL-SCNC: 24 MMOL/L (ref 21–32)
CREAT SERPL-MCNC: 0.84 MG/DL (ref 0.6–1.3)
ERYTHROCYTE [DISTWIDTH] IN BLOOD BY AUTOMATED COUNT: 14.8 % (ref 11.6–15.1)
GFR SERPL CREATININE-BSD FRML MDRD: 93 ML/MIN/1.73SQ M
GLUCOSE SERPL-MCNC: 130 MG/DL (ref 65–140)
HCT VFR BLD AUTO: 36.9 % (ref 36.5–49.3)
HGB BLD-MCNC: 12.4 G/DL (ref 12–17)
MAGNESIUM SERPL-MCNC: 1.8 MG/DL (ref 1.9–2.7)
MCH RBC QN AUTO: 30.4 PG (ref 26.8–34.3)
MCHC RBC AUTO-ENTMCNC: 33.6 G/DL (ref 31.4–37.4)
MCV RBC AUTO: 90 FL (ref 82–98)
PHOSPHATE SERPL-MCNC: 3.6 MG/DL (ref 2.3–4.1)
PLATELET # BLD AUTO: 354 THOUSANDS/UL (ref 149–390)
PMV BLD AUTO: 10.2 FL (ref 8.9–12.7)
POTASSIUM SERPL-SCNC: 4 MMOL/L (ref 3.5–5.3)
PROT SERPL-MCNC: 6.6 G/DL (ref 6.4–8.4)
RBC # BLD AUTO: 4.08 MILLION/UL (ref 3.88–5.62)
SODIUM SERPL-SCNC: 137 MMOL/L (ref 135–147)
WBC # BLD AUTO: 14.74 THOUSAND/UL (ref 4.31–10.16)

## 2024-05-30 PROCEDURE — 80053 COMPREHEN METABOLIC PANEL: CPT | Performed by: SURGERY

## 2024-05-30 PROCEDURE — 85027 COMPLETE CBC AUTOMATED: CPT | Performed by: SURGERY

## 2024-05-30 PROCEDURE — 84100 ASSAY OF PHOSPHORUS: CPT | Performed by: SURGERY

## 2024-05-30 PROCEDURE — 83735 ASSAY OF MAGNESIUM: CPT | Performed by: SURGERY

## 2024-05-30 PROCEDURE — 99024 POSTOP FOLLOW-UP VISIT: CPT | Performed by: STUDENT IN AN ORGANIZED HEALTH CARE EDUCATION/TRAINING PROGRAM

## 2024-05-30 RX ORDER — MAGNESIUM SULFATE HEPTAHYDRATE 40 MG/ML
2 INJECTION, SOLUTION INTRAVENOUS ONCE
Status: COMPLETED | OUTPATIENT
Start: 2024-05-30 | End: 2024-05-30

## 2024-05-30 RX ADMIN — CEFTRIAXONE 2000 MG: 1 INJECTION, POWDER, FOR SOLUTION INTRAMUSCULAR; INTRAVENOUS at 13:45

## 2024-05-30 RX ADMIN — GABAPENTIN 300 MG: 250 SOLUTION ORAL at 20:11

## 2024-05-30 RX ADMIN — CHLORHEXIDINE GLUCONATE 15 ML: 1.2 SOLUTION ORAL at 20:11

## 2024-05-30 RX ADMIN — MAGNESIUM SULFATE HEPTAHYDRATE 2 G: 40 INJECTION, SOLUTION INTRAVENOUS at 09:13

## 2024-05-30 RX ADMIN — ENOXAPARIN SODIUM 40 MG: 40 INJECTION SUBCUTANEOUS at 09:13

## 2024-05-30 RX ADMIN — CHLORHEXIDINE GLUCONATE 15 ML: 1.2 SOLUTION ORAL at 09:13

## 2024-05-30 NOTE — PROGRESS NOTES
"Progress Note  Don Valdez 63 y.o. male MRN: [id number]  Unit/Bed#: [facility] 910-01 Encounter: [id number]    Assessment  63M s/p 5/23 lap cass w IOC c/b retained stone in CBD, presents as a transfer from [facility] w elevated LFTs and bilirubin, Ecoli bacteremia, cholangitis.  - s/p 5/27 ERCP w CBD and PD stents placed    Plan  - lo-fat diet  - continue ctx for today  - dispo planning, no rehab needs per PT/OT  - DVT ppx    Subjective/Objective   NAOE. Pain controlled. Tolerating diet, no n/v. Having BMs, flatus. Voiding. Walking.    VSS on RA.  /95   Pulse 74   Temp 98.3 °F (36.8 °C) (Oral)   Resp 18   Ht 5' 8\" (1.727 m)   Wt 101 kg (222 lb 10.6 oz)   SpO2 92%   BMI 33.86 kg/m²     Physical Exam:  General: NAD  CV: nl rate  Lungs: nl wob. No resp distress.  ABD: Soft, protuberant, NT  Extrem: No CCE    UOP: x4  Stool: x2    5/28 bcx: NG@24h  Recent Labs     05/28/24  0525 05/28/24  0904 05/29/24  0522 05/30/24  0434 05/30/24  0458   WBC 17.38*  --  14.49* 14.74*  --    HGB 11.5*  --  12.1 12.4  --      --  343 354  --    SODIUM 138  --  141  --  137   K 4.4  --  4.1  --  4.0     --  106  --  104   CO2 25  --  24  --  24   BUN 18  --  19  --  16   CREATININE 0.81  --  0.97  --  0.84   GLUC 140  --  137  --  130   CALCIUM 8.6  --  8.6  --  9.1   MG 2.3  --   --   --  1.8*   PHOS 3.5  --   --   --  3.6   AST 53* 44*  --   --  61*   * 96*  --   --  101*   ALKPHOS 125* 118*  --   --  133*   TBILI 1.69* 1.44*  --   --  0.89   EGFR 94  --  82  --  93       "

## 2024-05-30 NOTE — PLAN OF CARE
Problem: PAIN - ADULT  Goal: Verbalizes/displays adequate comfort level or baseline comfort level  Description: Interventions:  - Encourage patient to monitor pain and request assistance  - Assess pain using appropriate pain scale  - Administer analgesics based on type and severity of pain and evaluate response  - Implement non-pharmacological measures as appropriate and evaluate response  - Consider cultural and social influences on pain and pain management  - Notify physician/advanced practitioner if interventions unsuccessful or patient reports new pain  Outcome: Progressing     Problem: INFECTION - ADULT  Goal: Absence or prevention of progression during hospitalization  Description: INTERVENTIONS:  - Assess and monitor for signs and symptoms of infection  - Monitor lab/diagnostic results  - Monitor all insertion sites, i.e. indwelling lines, tubes, and drains  - Monitor endotracheal if appropriate and nasal secretions for changes in amount and color  - [place] appropriate cooling/warming therapies per order  - Administer medications as ordered  - Instruct and encourage patient and family to use good hand hygiene technique  - Identify and instruct in appropriate isolation precautions for identified infection/condition  Outcome: Progressing  Goal: Absence of fever/infection during neutropenic period  Description: INTERVENTIONS:  - Monitor WBC    Outcome: Progressing     Problem: SAFETY ADULT  Goal: Patient will remain free of falls  Description: INTERVENTIONS:  - Educate patient/family on patient safety including physical limitations  - Instruct patient to call for assistance with activity   - Consult OT/PT to assist with strengthening/mobility   - Keep Call bell within reach  - Keep bed low and locked with side rails adjusted as appropriate  - Keep care items and personal belongings within reach  - Initiate and maintain comfort rounds  - Make Fall Risk Sign visible to staff  - Offer Toileting every 2 Hours,  in advance of need  - Initiate/Maintain bed alarm  - Obtain necessary fall risk management equipment: yes   - Apply yellow socks and bracelet for high fall risk patients  - Consider moving patient to room near nurses station  Outcome: Progressing  Goal: Maintain or return to baseline ADL function  Description: INTERVENTIONS:  -  Assess patient's ability to carry out ADLs; assess patient's baseline for ADL function and identify physical deficits which impact ability to perform ADLs (bathing, care of mouth/teeth, toileting, grooming, dressing, etc.)  - Assess/evaluate cause of self-care deficits   - Assess range of motion  - Assess patient's mobility; develop plan if impaired  - Assess patient's need for assistive devices and provide as appropriate  - Encourage maximum independence but intervene and supervise when necessary  - Involve family in performance of ADLs  - Assess for home care needs following discharge   - Consider OT consult to assist with ADL evaluation and planning for discharge  - Provide patient education as appropriate  Outcome: Progressing  Goal: Maintains/Returns to pre admission functional level  Description: INTERVENTIONS:  - Perform AM-PAC 6 Click Basic Mobility/ Daily Activity assessment daily.  - Set and communicate daily mobility goal to care team and patient/family/caregiver.   - Collaborate with rehabilitation services on mobility goals if consulted  - Perform Range of Motion 3 times a day.  - Reposition patient every 2 hours.  - Dangle patient 3 times a day  - Stand patient 3 times a day  - Ambulate patient 3 times a day  - Out of bed to chair 3 times a day   - Out of bed for meals 3 times a day  - Out of bed for toileting  - Record patient progress and toleration of activity level   Outcome: Progressing     Problem: DISCHARGE PLANNING  Goal: Discharge to home or other facility with appropriate resources  Description: INTERVENTIONS:  - Identify barriers to discharge w/patient and  caregiver  - Arrange for needed discharge resources and transportation as appropriate  - Identify discharge learning needs (meds, wound care, etc.)  - Arrange for interpretive services to assist at discharge as needed  - Refer to Case Management Department for coordinating discharge planning if the patient needs post-hospital services based on physician/advanced practitioner order or complex needs related to functional status, cognitive ability, or social support system  Outcome: Progressing     Problem: Knowledge Deficit  Goal: Patient/family/caregiver demonstrates understanding of disease process, treatment plan, medications, and discharge instructions  Description: Complete learning assessment and assess knowledge base.  Interventions:  - Provide teaching at level of understanding  - Provide teaching via preferred learning methods  Outcome: Progressing

## 2024-05-31 ENCOUNTER — APPOINTMENT (INPATIENT)
Dept: RADIOLOGY | Facility: HOSPITAL | Age: 64
DRG: 862 | End: 2024-05-31
Payer: COMMERCIAL

## 2024-05-31 VITALS
BODY MASS INDEX: 33.75 KG/M2 | DIASTOLIC BLOOD PRESSURE: 88 MMHG | HEART RATE: 89 BPM | WEIGHT: 222.66 LBS | SYSTOLIC BLOOD PRESSURE: 148 MMHG | OXYGEN SATURATION: 93 % | RESPIRATION RATE: 15 BRPM | TEMPERATURE: 98.5 F | HEIGHT: 68 IN

## 2024-05-31 LAB
ALBUMIN SERPL BCP-MCNC: 3.8 G/DL (ref 3.5–5)
ALP SERPL-CCNC: 140 U/L (ref 34–104)
ALT SERPL W P-5'-P-CCNC: 94 U/L (ref 7–52)
ANION GAP SERPL CALCULATED.3IONS-SCNC: 10 MMOL/L (ref 4–13)
ANISOCYTOSIS BLD QL SMEAR: PRESENT
AST SERPL W P-5'-P-CCNC: 44 U/L (ref 13–39)
BASOPHILS # BLD MANUAL: 0 THOUSAND/UL (ref 0–0.1)
BASOPHILS NFR MAR MANUAL: 0 % (ref 0–1)
BILIRUB SERPL-MCNC: 0.89 MG/DL (ref 0.2–1)
BUN SERPL-MCNC: 14 MG/DL (ref 5–25)
CALCIUM SERPL-MCNC: 9.2 MG/DL (ref 8.4–10.2)
CHLORIDE SERPL-SCNC: 101 MMOL/L (ref 96–108)
CO2 SERPL-SCNC: 22 MMOL/L (ref 21–32)
CREAT SERPL-MCNC: 0.86 MG/DL (ref 0.6–1.3)
EOSINOPHIL # BLD MANUAL: 0.15 THOUSAND/UL (ref 0–0.4)
EOSINOPHIL NFR BLD MANUAL: 1 % (ref 0–6)
ERYTHROCYTE [DISTWIDTH] IN BLOOD BY AUTOMATED COUNT: 14.7 % (ref 11.6–15.1)
GFR SERPL CREATININE-BSD FRML MDRD: 92 ML/MIN/1.73SQ M
GLUCOSE SERPL-MCNC: 130 MG/DL (ref 65–140)
HCT VFR BLD AUTO: 39 % (ref 36.5–49.3)
HGB BLD-MCNC: 13.2 G/DL (ref 12–17)
LYMPHOCYTES # BLD AUTO: 2.08 THOUSAND/UL (ref 0.6–4.47)
LYMPHOCYTES # BLD AUTO: 7 % (ref 14–44)
MAGNESIUM SERPL-MCNC: 2.2 MG/DL (ref 1.9–2.7)
MCH RBC QN AUTO: 30.4 PG (ref 26.8–34.3)
MCHC RBC AUTO-ENTMCNC: 33.8 G/DL (ref 31.4–37.4)
MCV RBC AUTO: 90 FL (ref 82–98)
MONOCYTES # BLD AUTO: 1.04 THOUSAND/UL (ref 0–1.22)
MONOCYTES NFR BLD: 7 % (ref 4–12)
NEUTROPHILS # BLD MANUAL: 11.58 THOUSAND/UL (ref 1.85–7.62)
NEUTS BAND NFR BLD MANUAL: 1 % (ref 0–8)
NEUTS SEG NFR BLD AUTO: 77 % (ref 43–75)
PLATELET # BLD AUTO: 391 THOUSANDS/UL (ref 149–390)
PLATELET BLD QL SMEAR: ADEQUATE
PMV BLD AUTO: 10.3 FL (ref 8.9–12.7)
POIKILOCYTOSIS BLD QL SMEAR: PRESENT
POLYCHROMASIA BLD QL SMEAR: PRESENT
POTASSIUM SERPL-SCNC: 4.2 MMOL/L (ref 3.5–5.3)
PROT SERPL-MCNC: 7.2 G/DL (ref 6.4–8.4)
RBC # BLD AUTO: 4.34 MILLION/UL (ref 3.88–5.62)
RBC MORPH BLD: PRESENT
SODIUM SERPL-SCNC: 133 MMOL/L (ref 135–147)
VARIANT LYMPHS # BLD AUTO: 7 %
WBC # BLD AUTO: 14.85 THOUSAND/UL (ref 4.31–10.16)

## 2024-05-31 PROCEDURE — 74177 CT ABD & PELVIS W/CONTRAST: CPT

## 2024-05-31 PROCEDURE — NC001 PR NO CHARGE: Performed by: STUDENT IN AN ORGANIZED HEALTH CARE EDUCATION/TRAINING PROGRAM

## 2024-05-31 PROCEDURE — 80053 COMPREHEN METABOLIC PANEL: CPT

## 2024-05-31 PROCEDURE — 85027 COMPLETE CBC AUTOMATED: CPT

## 2024-05-31 PROCEDURE — 99024 POSTOP FOLLOW-UP VISIT: CPT

## 2024-05-31 PROCEDURE — 85007 BL SMEAR W/DIFF WBC COUNT: CPT

## 2024-05-31 PROCEDURE — 83735 ASSAY OF MAGNESIUM: CPT

## 2024-05-31 RX ORDER — SULFAMETHOXAZOLE AND TRIMETHOPRIM 800; 160 MG/1; MG/1
1 TABLET ORAL EVERY 12 HOURS SCHEDULED
Qty: 20 TABLET | Refills: 0 | Status: SHIPPED | OUTPATIENT
Start: 2024-05-31 | End: 2024-06-10

## 2024-05-31 RX ADMIN — CHLORHEXIDINE GLUCONATE 15 ML: 1.2 SOLUTION ORAL at 08:49

## 2024-05-31 RX ADMIN — ENOXAPARIN SODIUM 40 MG: 40 INJECTION SUBCUTANEOUS at 08:49

## 2024-05-31 RX ADMIN — IOHEXOL 100 ML: 350 INJECTION, SOLUTION INTRAVENOUS at 11:52

## 2024-05-31 NOTE — DISCHARGE INSTR - AVS FIRST PAGE
Follow-up  Call the Surgical office to make appointment for 2 weeks with [phone]  Make an appointment to follow up with your primary care physician in the next 1-2 weeks.  Call the office if you have increased pain not relieved with pain medicine.  Call the office if you have a fever,redness, the wound opens up, you have pus draining from your incision.     Wound care  If you have adhesive wound closure, do not rub or pick off.  If you have Steri-Strips, leave them in place. You may take them off in 5 days.  If they fall off on their own, this is okay.    Activity  No driving until seen in the office.   No driving while taking pain meds.   No heavy lifting or strenuous exercise until you are cleared in the surgery office. Nothing heavier than 10 pounds    Shower  You may shower and get incisions wet, wash with soap and water, do not scrub, rinse, and pat dry.   No baths, swimming pools, or hot tubs, until you follow up with the general surgery clinic    Medication  If you do not need strong pain medicine you may take Tylenol.   Do not take acetaminophen (Tylenol) WITH  pain meds that contain acetaminophen. Take one or the other.  Do not exceed more than 4000 mg of acetaminophen in 24 hours or 3000 mg if you have liver disease.   If you were given an antibiotic take it until it is finished.

## 2024-05-31 NOTE — PROGRESS NOTES
"Progress Note  Don Valdez 63 y.o. male MRN: [id number]  Unit/Bed#: [facility] 910-01 Encounter: [id number]    Assessment  64 yo M  s/p 5/23 lap cass w IOC c/b retained stone in CBD, presented as a transfer from [facility] w elevated LFTs and bilirubin, Ecoli bacteremia, cholangitis, s/p 5/27 ERCP w CBD and PD stent placement.    VSS ORA  UOP: Not measured. Patient voiding independently.  CMP pending.  WBC 14.95 from 14.74  HGB 13.2 from 12.4  Repeat blood cultures no growth after 48 hrs    Plan  - Low fat diet  - Consider discharge this afternoon  - DVT ppx    Subjective/Objective   No events overnight. Patient denies any pain. Tolerating diet, no n/v. Had BM this morning, passing flatus. Voiding and ambulating without difficulty.    VSS on RA.  /86 (BP Location: Right arm)   Pulse 94   Temp 99 °F (37.2 °C) (Oral)   Resp 18   Ht 5' 8\" (1.727 m)   Wt 101 kg (222 lb 10.6 oz)   SpO2 92%   BMI 33.86 kg/m²     Physical Exam:  General: NAD  CV: nl rate  Lungs: nl wob. No resp distress.  ABD: Soft, ND, NT. Incisions c/d/i  Extrem: No CCE  Neuro: AAO x3    UOP: Not measured. Patient voiding independently.    05/31/24 labs pending  Recent Labs     05/28/24  0904 05/29/24  0522 05/29/24  0522 05/30/24  0434 05/30/24  0458 05/31/24  0505   WBC  --  14.49*   < > 14.74*  --  14.85*   HGB  --  12.1   < > 12.4  --  13.2   PLT  --  343   < > 354  --  391*   SODIUM  --  141  --   --  137  --    K  --  4.1  --   --  4.0  --    CL  --  106  --   --  104  --    CO2  --  24  --   --  24  --    BUN  --  19  --   --  16  --    CREATININE  --  0.97  --   --  0.84  --    GLUC  --  137  --   --  130  --    CALCIUM  --  8.6  --   --  9.1  --    MG  --   --   --   --  1.8*  --    PHOS  --   --   --   --  3.6  --    AST 44*  --   --   --  61*  --    ALT 96*  --   --   --  101*  --    ALKPHOS 118*  --   --   --  133*  --    TBILI 1.44*  --   --   --  0.89  --    EGFR  --  82  --   --  93  --     < > = values in this interval not " displayed.     Kristi Cordoba, MS-4  [place]

## 2024-05-31 NOTE — PLAN OF CARE
Problem: PAIN - ADULT  Goal: Verbalizes/displays adequate comfort level or baseline comfort level  Description: Interventions:  - Encourage patient to monitor pain and request assistance  - Assess pain using appropriate pain scale  - Administer analgesics based on type and severity of pain and evaluate response  - Implement non-pharmacological measures as appropriate and evaluate response  - Consider cultural and social influences on pain and pain management  - Notify physician/advanced practitioner if interventions unsuccessful or patient reports new pain  Outcome: Progressing     Problem: INFECTION - ADULT  Goal: Absence or prevention of progression during hospitalization  Description: INTERVENTIONS:  - Assess and monitor for signs and symptoms of infection  - Monitor lab/diagnostic results  - Monitor all insertion sites, i.e. indwelling lines, tubes, and drains  - Monitor endotracheal if appropriate and nasal secretions for changes in amount and color  - [place] appropriate cooling/warming therapies per order  - Administer medications as ordered  - Instruct and encourage patient and family to use good hand hygiene technique  - Identify and instruct in appropriate isolation precautions for identified infection/condition  Outcome: Progressing

## 2024-05-31 NOTE — DISCHARGE INSTR - DIET
To schedule an outpatient appointment with a Registered Dietitian, call [phone], option 2   [Dear  ___] : Dear  [unfilled], [Consult Letter:] : I had the pleasure of evaluating your patient, [unfilled]. [Please see my note below.] : Please see my note below. [Consult Closing:] : Thank you very much for allowing me to participate in the care of this patient.  If you have any questions, please do not hesitate to contact me. [Sincerely,] : Sincerely, [FreeTextEntry2] : Allen Hart MD\par [address]\[place] [address]\par

## 2024-05-31 NOTE — PLAN OF CARE
Problem: PAIN - ADULT  Goal: Verbalizes/displays adequate comfort level or baseline comfort level  Description: Interventions:  - Encourage patient to monitor pain and request assistance  - Assess pain using appropriate pain scale  - Administer analgesics based on type and severity of pain and evaluate response  - Implement non-pharmacological measures as appropriate and evaluate response  - Consider cultural and social influences on pain and pain management  - Notify physician/advanced practitioner if interventions unsuccessful or patient reports new pain  Outcome: Progressing     Problem: INFECTION - ADULT  Goal: Absence or prevention of progression during hospitalization  Description: INTERVENTIONS:  - Assess and monitor for signs and symptoms of infection  - Monitor lab/diagnostic results  - Monitor all insertion sites, i.e. indwelling lines, tubes, and drains  - Monitor endotracheal if appropriate and nasal secretions for changes in amount and color  - [place] appropriate cooling/warming therapies per order  - Administer medications as ordered  - Instruct and encourage patient and family to use good hand hygiene technique  - Identify and instruct in appropriate isolation precautions for identified infection/condition  Outcome: Progressing

## 2024-06-02 LAB
BACTERIA BLD CULT: NORMAL
BACTERIA BLD CULT: NORMAL

## 2024-06-03 NOTE — UTILIZATION REVIEW
NOTIFICATION OF ADMISSION DISCHARGE   This is a Notification of Discharge from [facility]. Please be advised that this patient has been discharge from our facility. Below you will find the admission and discharge date and time including the patient’s disposition.   UTILIZATION REVIEW CONTACT:  Yayo Grijalva  Utilization   Network Utilization Review Department  Phone: [phone] x carefully listen to the prompts. All voicemails are confidential.  Email: [email or web address]     ADMISSION INFORMATION  PRESENTATION DATE: 5/26/2024  8:26 AM  OBERVATION ADMISSION DATE:   INPATIENT ADMISSION DATE: 5/26/24  9:07 AM   DISCHARGE DATE: 5/31/2024  3:28 PM   DISPOSITION:Home/Self Care    Network Utilization Review Department  ATTENTION: Please call with any questions or concerns to [phone] and carefully listen to the prompts so that you are directed to the right person. All voicemails are confidential.   For Discharge needs, contact Care Management DC Support Team at [phone] opt. 2  Send all requests for admission clinical reviews, approved or denied determinations and any other requests to dedicated fax number below belonging to the campus where the patient is receiving treatment. List of dedicated fax numbers for the Facilities:  FACILITY NAME UR FAX NUMBER   ADMISSION DENIALS (Administrative/Medical Necessity) [phone]   DISCHARGE SUPPORT TEAM ([facility]) [phone]   PARENT CHILD HEALTH (Maternity/NICU/Pediatrics) [phone]   [facility] [phone]   [facility] [phone]   [facility] [phone]   [facility] [phone]   [facility] [phone]   [facility] [phone]   [facility] [phone]   [facility] [phone]   [facility]  [facility] [phone]   [facility] [phone]   [facility] [phone]   [facility] [phone]

## 2024-06-17 ENCOUNTER — TELEPHONE (OUTPATIENT)
Dept: SURGERY | Facility: CLINIC | Age: 64
End: 2024-06-17

## 2024-06-17 NOTE — TELEPHONE ENCOUNTER
Called twice and left message to give our office a call to reschedule his appt to this afternoon, due to Dr. Bethea having an emergency.    Can be double book with [facility] post op's

## 2024-06-18 RX ORDER — PANTOPRAZOLE SODIUM 40 MG/1
TABLET, DELAYED RELEASE ORAL
COMMUNITY
Start: 2024-06-05

## 2024-06-19 ENCOUNTER — OFFICE VISIT (OUTPATIENT)
Dept: SURGERY | Facility: CLINIC | Age: 64
End: 2024-06-19

## 2024-06-19 VITALS
WEIGHT: 202 LBS | BODY MASS INDEX: 30.62 KG/M2 | SYSTOLIC BLOOD PRESSURE: 144 MMHG | HEIGHT: 68 IN | RESPIRATION RATE: 16 BRPM | DIASTOLIC BLOOD PRESSURE: 82 MMHG | OXYGEN SATURATION: 95 % | TEMPERATURE: 98.5 F | HEART RATE: 88 BPM

## 2024-06-19 DIAGNOSIS — K80.67 CALCULUS OF GALLBLADDER AND BILE DUCT WITH ACUTE AND CHRONIC CHOLECYSTITIS WITH OBSTRUCTION: Primary | ICD-10-CM

## 2024-06-19 PROBLEM — K85.10 GALLSTONE PANCREATITIS: Status: RESOLVED | Noted: 2024-05-21 | Resolved: 2024-06-19

## 2024-06-19 PROCEDURE — 99024 POSTOP FOLLOW-UP VISIT: CPT | Performed by: SURGERY

## 2024-06-19 NOTE — PROGRESS NOTES
Ambulatory Visit  Name: Don Valdez      : 1960      MRN: [id number]  Encounter Provider: Don Bethea MD  Encounter Date: 2024   Encounter department: [facility] [place]    Assessment & Plan   1. Calculus of gallbladder and bile duct with acute and chronic cholecystitis with obstruction  Assessment & Plan:  Patient returns to the office for routine scheduled follow-up status post definitive treatment of his acute on chronic calculus cholecystitis and obstructing choledocholithiasis by laparoscopic cholecystectomy with postoperative ERCP.    Patient voiced no complaints referable to his abdomen at the present time.  He did note some heartburn which appears to be self-limited.    On physical examination he is well-appearing.  Pleasant competent reliable as a historian.  He has a benign abdominal examination.  The surgical wounds are clean dry and intact.    Patient appears well status post laparoscopic cholecystectomy with intraoperative cholangiogram.    Patient is been cleared to resume normal levels of activity.  He has no restrictions on his diet.    He will be following up with gastroenterology as scheduled.    He has been encouraged to follow-up to practice at any point in future questions or concerns.      History of Present Illness     Don Valdez is a 63 y.o. male who presents for his po visit after having a lap cass with ioc 2024. Patient states incs are healed and denies drainage or redness/ irritation . Patient denies any GI issues but states he has been having heart burn. LAWANDA Carmona    Review of Systems   Constitutional:  Negative for chills and fever.   HENT:  Negative for ear pain and sore throat.    Eyes:  Negative for pain and visual disturbance.   Respiratory:  Negative for cough and shortness of breath.    Cardiovascular:  Negative for chest pain and palpitations.   Gastrointestinal:  Negative for abdominal pain and vomiting.   Genitourinary:   "Negative for dysuria and hematuria.   Musculoskeletal:  Negative for arthralgias and back pain.   Skin:  Negative for color change and rash.   Neurological:  Negative for seizures and syncope.   All other systems reviewed and are negative.      Objective     /82 (BP Location: Left arm, Patient Position: Sitting, Cuff Size: Standard)   Pulse 88   Temp 98.5 °F (36.9 °C) (Temporal)   Resp 16   Ht 5' 8\" (1.727 m)   Wt 91.6 kg (202 lb)   SpO2 95%   BMI 30.71 kg/m²     Physical Exam  Vitals and nursing note reviewed.   Constitutional:       General: He is not in acute distress.     Appearance: He is well-developed.   HENT:      Head: Normocephalic and atraumatic.   Eyes:      Conjunctiva/sclera: Conjunctivae normal.   Cardiovascular:      Rate and Rhythm: Normal rate and regular rhythm.      Heart sounds: No murmur heard.  Pulmonary:      Effort: Pulmonary effort is normal. No respiratory distress.      Breath sounds: Normal breath sounds.   Abdominal:      Palpations: Abdomen is soft.      Tenderness: There is no abdominal tenderness.   Musculoskeletal:         General: No swelling.      Cervical back: Neck supple.   Skin:     General: Skin is warm and dry.      Capillary Refill: Capillary refill takes less than 2 seconds.   Neurological:      Mental Status: He is alert.   Psychiatric:         Mood and Affect: Mood normal.       Administrative Statements           "

## 2024-06-19 NOTE — ASSESSMENT & PLAN NOTE
Patient returns to the office for routine scheduled follow-up status post definitive treatment of his acute on chronic calculus cholecystitis and obstructing choledocholithiasis by laparoscopic cholecystectomy with postoperative ERCP.    Patient voiced no complaints referable to his abdomen at the present time.  He did note some heartburn which appears to be self-limited.    On physical examination he is well-appearing.  Pleasant competent reliable as a historian.  He has a benign abdominal examination.  The surgical wounds are clean dry and intact.    Patient appears well status post laparoscopic cholecystectomy with intraoperative cholangiogram.    Patient is been cleared to resume normal levels of activity.  He has no restrictions on his diet.    He will be following up with gastroenterology as scheduled.    He has been encouraged to follow-up to practice at any point in future questions or concerns.

## 2024-07-01 ENCOUNTER — OFFICE VISIT (OUTPATIENT)
Age: 64
End: 2024-07-01
Payer: COMMERCIAL

## 2024-07-01 VITALS
OXYGEN SATURATION: 98 % | BODY MASS INDEX: 30.31 KG/M2 | WEIGHT: 200 LBS | DIASTOLIC BLOOD PRESSURE: 72 MMHG | HEART RATE: 78 BPM | SYSTOLIC BLOOD PRESSURE: 128 MMHG | TEMPERATURE: 97.6 F | HEIGHT: 68 IN

## 2024-07-01 DIAGNOSIS — R74.01 TRANSAMINITIS: ICD-10-CM

## 2024-07-01 DIAGNOSIS — K85.10 ACUTE BILIARY PANCREATITIS WITHOUT INFECTION OR NECROSIS: Primary | ICD-10-CM

## 2024-07-01 DIAGNOSIS — K21.9 GASTROESOPHAGEAL REFLUX DISEASE, UNSPECIFIED WHETHER ESOPHAGITIS PRESENT: ICD-10-CM

## 2024-07-01 DIAGNOSIS — Z86.010 HISTORY OF COLON POLYPS: ICD-10-CM

## 2024-07-01 PROBLEM — Z86.0100 HISTORY OF COLON POLYPS: Status: ACTIVE | Noted: 2024-07-01

## 2024-07-01 PROCEDURE — 99214 OFFICE O/P EST MOD 30 MIN: CPT | Performed by: NURSE PRACTITIONER

## 2024-07-01 RX ORDER — PRAVASTATIN SODIUM 10 MG
10 TABLET ORAL DAILY
COMMUNITY

## 2024-07-01 NOTE — PROGRESS NOTES
[facility] - Outpatient Follow Up  Don Valdez 63 y.o. male MRN: [id number]  Encounter: [id number]          ASSESSMENT AND PLAN:    The patient presents today for an follow up office visit for hospital outpatient follow-up for his acute biliary pancreatitis without infection or necrosis, transaminitis, GERD symptoms, and history of colon polyps.    Exam:  Oral mucosa normal upon visual inspection, without any sores, lesions, or ulcerations. Sclera without icterus and benign. Lung sounds CTA b/l. Normal S1 & S2 upon exam. Abdomen is round, distended, moderately firm, without any significant tenderness noted upon exam, with faint bowel sounds x 4. No edema noted of the b/l lower extremities upon exam today. Skin is non-icteric.     1. Acute biliary pancreatitis without infection or necrosis  -     CBC and differential; Future  -     Comprehensive metabolic panel; Future  -     Protime-INR; Future  -     Bilirubin, direct; Future  -     ERCP; Future; Expected date: 07/01/2024  2. Transaminitis  Assessment & Plan:  With regards to the acute pancreatitis and transaminitis:  Proceed with repeat ERCP with Dr. Wong.  Proceed with repeat blood work in the next 1 to 2 weeks to continue to monitor his LFTs.  Orders:  -     CBC and differential; Future  -     Comprehensive metabolic panel; Future  -     Protime-INR; Future  -     Bilirubin, direct; Future  -     ERCP; Future; Expected date: 07/01/2024  3. Gastroesophageal reflux disease, unspecified whether esophagitis present  Assessment & Plan:  While the patient was in the office today, I did discuss with the patient that since he does still continue with some GERD symptoms, although they do seem to be slowly improving over time and I did explain to him that his pancreatitis was because of the gallstone blocking the common bile duct and most likely not the Protonix, we will have the patient stay off the Protonix but I would  like him to start over-the-counter famotidine 20 mg twice daily for the next 3 weeks and then 1 pill daily at bedtime for 3 weeks and then if symptoms have improved or resolved he can use the famotidine as needed.    Encouraged the patient to continue to try to avoid trigger foods is much as possible.  4. History of colon polyps  Assessment & Plan:  We will hold off on any repeat colonoscopies until the recommended surveillance date unless the patient would start to develop red flag symptoms in the future.  The patient was agreeable and verbalized an understanding.  DUE: 1/31/25    Reviewed GI red flag symptoms with patient today.      The patient will schedule a follow up office visit after his ERCP, but understands to call or contact our office if there are any issues or concerns in the mean time.    ______________________________________________________________________    HPI: The patient is a 63 y.o. male who presents today for a follow up office visit regarding hospital outpatient follow-up for his acute biliary pancreatitis without infection or necrosis, transaminitis, GERD symptoms, and history of colon polyps.  The patient was previously seen as an inpatient hospital consult and evaluated by Dr. Wong of our advanced endoscopy team with an ERCP with common bile duct stenting on May 27, 2024 after the laparoscopic cholecystectomy with Dr. Bethea on May 23, 2024.  The patient reports that since his discharge from the hospital there has been significant overall improvement in his pain and discomfort, but that every once in a while he does feel some abdominal discomfort and pressure, but it is minimal and is slowly fading away.  The patient reports that he is having some worsening GERD symptoms since he was discharged from the hospital because he has not been on his Protonix as he had stopped the Protonix prior to going to the hospital because he had read the Protonix could cause pancreatitis as a side effect.   He does feel that over time the GERD symptoms has started to slightly improve as he has been completely avoiding any kind of red sauce as that is typically a trigger food for him.  The patient reports that since leaving the hospital he has pretty much advance his diet to a regular diet without any issues.  The patient is to proceed with a repeat ERCP with Dr. Wong 6 to 8 weeks from his first ERCP for reevaluation and possible stent removal.  The patient's most recent blood work does show continued elevation in his LFTs although they are mild elevations in his last blood work was more than 4 weeks ago.    The patient denies any nausea, dysphagia, vomiting, decreased appetite, unplanned weight loss, or abdominal pain. Water Intake: 1-2 bottles daily.     The patient reports that they have a BM daily and reports that it is relieving, without any bloating, consistent diarrhea, nocturnal BMs, constipation, straining, melena or bloody stools. Last BM: Today. Flatus: Yes, normal for him.    Tobacco/Vaping: Denied  ETOH: Denied  Marijuana: Denied  Illicit Drug Use: Denied    Meds: None  Daily NSAID Use: Denied    Imaging: (5/31/24) CT of the abdomen pelvis with contrast:Mild inflammation in the cholecystectomy bed and upper abdomen has decreased since May 26, 2024. No abdominopelvic abscess. Biliary ductal dilation has resolved following stent placement.      Endoscopy History: EGD: (None):     COLONOSCOPY: (1/31/20): Normal with a recommendation to proceed with a repeat colonoscopy in 5 years according to the patient.    DUE: 1/31/25    REVIEW OF SYSTEMS:    CONSTITUTIONAL: Denies any fever, chills, rigors, and weight loss.  HEENT: No earache or tinnitus. Denies hearing loss or visual disturbances.  CARDIOVASCULAR: No chest pain or palpitations.   RESPIRATORY: Denies any cough, hemoptysis, shortness of breath or dyspnea on exertion.  GASTROINTESTINAL: As noted in the History of Present Illness.   GENITOURINARY: No  "problems with urination. Denies any hematuria or dysuria.  NEUROLOGIC: No dizziness or vertigo, denies headaches.   MUSCULOSKELETAL: Denies any muscle or joint pain.   SKIN: Denies skin rashes or itching.   ENDOCRINE: Denies excessive thirst. Denies intolerance to heat or cold.  PSYCHOSOCIAL: Denies depression or anxiety. Denies any recent memory loss.       Historical Information   Past Medical History:   Diagnosis Date    GERD (gastroesophageal reflux disease)     High cholesterol     Restless leg syndrome      Past Surgical History:   Procedure Laterality Date    CARPAL TUNNEL RELEASE Bilateral     CHOLECYSTECTOMY LAPAROSCOPIC N/A 5/23/2024    Procedure: CHOLECYSTECTOMY LAPAROSCOPIC W/ INTRAOP CHOLANGIOGRAM;  Surgeon: Don Bethea MD;  Location: [facility];  Service: General    ORIF FINGER / THUMB FRACTURE       Social History   Social History     Substance and Sexual Activity   Alcohol Use Not Currently     Social History     Substance and Sexual Activity   Drug Use Not Currently     Social History     Tobacco Use   Smoking Status Never   Smokeless Tobacco Never     History reviewed. No pertinent family history.    Meds/Allergies       Current Outpatient Medications:     gabapentin (NEURONTIN) 300 mg capsule    pravastatin (PRAVACHOL) 10 mg tablet    pantoprazole (PROTONIX) 40 mg tablet    Allergies   Allergen Reactions    Tetracycline Rash           Objective     Blood pressure 128/72, pulse 78, temperature 97.6 °F (36.4 °C), temperature source Temporal, height 5' 8\" (1.727 m), weight 90.7 kg (200 lb), SpO2 98%. Body mass index is 30.41 kg/m².        PHYSICAL EXAM:      General Appearance:   Alert, cooperative, no distress   HEENT:   Normocephalic, atraumatic, anicteric.     Neck:  Supple, symmetrical, trachea midline   Lungs:   Clear to auscultation bilaterally; no rales, rhonchi or wheezing; respirations unlabored    Heart::   Regular rate and rhythm; no murmur, rub, or gallop.   Abdomen:   Soft, " non-tender, non-distended; normal bowel sounds; no masses, no organomegaly    Genitalia:   Deferred    Rectal:   Deferred    Extremities:  No cyanosis, clubbing or edema    Pulses:  2+ and symmetric    Skin:  No jaundice, rashes, or lesions    Lymph nodes:  No palpable cervical lymphadenopathy        Lab Results:   No visits with results within 1 Day(s) from this visit.   Latest known visit with results is:   No results displayed because visit has over 200 results.            Radiology Results:   No results found.

## 2024-07-01 NOTE — PATIENT INSTRUCTIONS
Proceed with repeat ERCP with Dr Wong.  Proceed with repeat blood work in next 1-2 weeks.   Start famotidine 20 mg, 1 pill 2 x daily x 3 weeks, then 1 pill daily at bed time x 3 weeks, then if symptoms have improved/resolved, can use as needed.   Continue avoid trigger foods as much as possible.   Continue to watch for red flag symptoms.   Schedule a f/u OV after ERCP.     We did review GI red flag symptoms, including, but not limited to: chronic nausea, vomiting, diarrhea, chills, fever, and unintentional weight loss and should call or contact our office with any changes or concerns. I reviewed with the patient that if they notice any blood while vomiting or in their stool they should contact or office or go to the nearest emergency room for immediate evaluation. The patient was agreeable and verbalized an understanding.

## 2024-07-02 PROBLEM — K21.9 GASTROESOPHAGEAL REFLUX DISEASE: Status: ACTIVE | Noted: 2024-07-02

## 2024-07-02 NOTE — ASSESSMENT & PLAN NOTE
We will hold off on any repeat colonoscopies until the recommended surveillance date unless the patient would start to develop red flag symptoms in the future.  The patient was agreeable and verbalized an understanding.  DUE: 1/31/25    Reviewed GI red flag symptoms with patient today.

## 2024-07-02 NOTE — ASSESSMENT & PLAN NOTE
While the patient was in the office today, I did discuss with the patient that since he does still continue with some GERD symptoms, although they do seem to be slowly improving over time and I did explain to him that his pancreatitis was because of the gallstone blocking the common bile duct and most likely not the Protonix, we will have the patient stay off the Protonix but I would like him to start over-the-counter famotidine 20 mg twice daily for the next 3 weeks and then 1 pill daily at bedtime for 3 weeks and then if symptoms have improved or resolved he can use the famotidine as needed.    Encouraged the patient to continue to try to avoid trigger foods is much as possible.

## 2024-07-02 NOTE — ASSESSMENT & PLAN NOTE
With regards to the acute pancreatitis and transaminitis:  Proceed with repeat ERCP with Dr. Wong.  Proceed with repeat blood work in the next 1 to 2 weeks to continue to monitor his LFTs.

## 2024-07-08 ENCOUNTER — TELEPHONE (OUTPATIENT)
Dept: GASTROENTEROLOGY | Facility: CLINIC | Age: 64
End: 2024-07-08

## 2024-07-08 ENCOUNTER — LAB (OUTPATIENT)
Age: 64
End: 2024-07-08
Payer: COMMERCIAL

## 2024-07-08 DIAGNOSIS — K85.10 ACUTE BILIARY PANCREATITIS WITHOUT INFECTION OR NECROSIS: ICD-10-CM

## 2024-07-08 DIAGNOSIS — R74.01 TRANSAMINITIS: ICD-10-CM

## 2024-07-08 LAB
ALBUMIN SERPL BCG-MCNC: 4.3 G/DL (ref 3.5–5)
ALP SERPL-CCNC: 73 U/L (ref 34–104)
ALT SERPL W P-5'-P-CCNC: 35 U/L (ref 7–52)
ANION GAP SERPL CALCULATED.3IONS-SCNC: 13 MMOL/L (ref 4–13)
AST SERPL W P-5'-P-CCNC: 33 U/L (ref 13–39)
BASOPHILS # BLD AUTO: 0.06 THOUSANDS/ÂΜL (ref 0–0.1)
BASOPHILS NFR BLD AUTO: 1 % (ref 0–1)
BILIRUB DIRECT SERPL-MCNC: 0.12 MG/DL (ref 0–0.2)
BILIRUB SERPL-MCNC: 0.64 MG/DL (ref 0.2–1)
BUN SERPL-MCNC: 11 MG/DL (ref 5–25)
CALCIUM SERPL-MCNC: 9.7 MG/DL (ref 8.4–10.2)
CHLORIDE SERPL-SCNC: 104 MMOL/L (ref 96–108)
CO2 SERPL-SCNC: 22 MMOL/L (ref 21–32)
CREAT SERPL-MCNC: 0.98 MG/DL (ref 0.6–1.3)
EOSINOPHIL # BLD AUTO: 0.45 THOUSAND/ÂΜL (ref 0–0.61)
EOSINOPHIL NFR BLD AUTO: 6 % (ref 0–6)
ERYTHROCYTE [DISTWIDTH] IN BLOOD BY AUTOMATED COUNT: 14.4 % (ref 11.6–15.1)
GFR SERPL CREATININE-BSD FRML MDRD: 81 ML/MIN/1.73SQ M
GLUCOSE SERPL-MCNC: 181 MG/DL (ref 65–140)
HCT VFR BLD AUTO: 42.4 % (ref 36.5–49.3)
HGB BLD-MCNC: 14.6 G/DL (ref 12–17)
IMM GRANULOCYTES # BLD AUTO: 0.03 THOUSAND/UL (ref 0–0.2)
IMM GRANULOCYTES NFR BLD AUTO: 0 % (ref 0–2)
INR PPP: 1.03 (ref 0.84–1.19)
LYMPHOCYTES # BLD AUTO: 2.97 THOUSANDS/ÂΜL (ref 0.6–4.47)
LYMPHOCYTES NFR BLD AUTO: 37 % (ref 14–44)
MCH RBC QN AUTO: 31.1 PG (ref 26.8–34.3)
MCHC RBC AUTO-ENTMCNC: 34.4 G/DL (ref 31.4–37.4)
MCV RBC AUTO: 90 FL (ref 82–98)
MONOCYTES # BLD AUTO: 0.67 THOUSAND/ÂΜL (ref 0.17–1.22)
MONOCYTES NFR BLD AUTO: 8 % (ref 4–12)
NEUTROPHILS # BLD AUTO: 3.81 THOUSANDS/ÂΜL (ref 1.85–7.62)
NEUTS SEG NFR BLD AUTO: 48 % (ref 43–75)
NRBC BLD AUTO-RTO: 0 /100 WBCS
PLATELET # BLD AUTO: 332 THOUSANDS/UL (ref 149–390)
PMV BLD AUTO: 10.8 FL (ref 8.9–12.7)
POTASSIUM SERPL-SCNC: 3.9 MMOL/L (ref 3.5–5.3)
PROT SERPL-MCNC: 7.5 G/DL (ref 6.4–8.4)
PROTHROMBIN TIME: 13.4 SECONDS (ref 11.6–14.5)
RBC # BLD AUTO: 4.7 MILLION/UL (ref 3.88–5.62)
SODIUM SERPL-SCNC: 139 MMOL/L (ref 135–147)
WBC # BLD AUTO: 7.99 THOUSAND/UL (ref 4.31–10.16)

## 2024-07-08 PROCEDURE — 36415 COLL VENOUS BLD VENIPUNCTURE: CPT

## 2024-07-08 PROCEDURE — 85610 PROTHROMBIN TIME: CPT

## 2024-07-08 PROCEDURE — 85025 COMPLETE CBC W/AUTO DIFF WBC: CPT

## 2024-07-08 PROCEDURE — 80053 COMPREHEN METABOLIC PANEL: CPT

## 2024-07-08 PROCEDURE — 82248 BILIRUBIN DIRECT: CPT

## 2024-07-08 NOTE — TELEPHONE ENCOUNTER
----- Message from Erma STERLING sent at 7/3/2024  7:50 AM EDT -----  Regarding: FW: Please call Patient    ----- Message -----  From: Bebe Razo PA-C  Sent: 7/2/2024   4:53 PM EDT  To: [facility]  Subject: FW: Please call Patient                          Case D/W Dr. Wong will need ERCP 8 weeks after his last thank you!  ----- Message -----  From: Domi Dunlap MA  Sent: 7/1/2024   2:32 PM EDT  To: [facility]  Subject: FW: Please call Patient                          PLEASE call patient to Schedule ERCP in Media per ABRAM Avila. With Dr. Wong  PLEASE schedule follow up appointment for after procedure.  ----- Message -----  From: Domi Dunlap MA  Sent: 7/1/2024   2:27 PM EDT  To: [facility]  Subject: Please call Patient                              PLEASE call patient to Schedule ERCP in Media per ABRAM Avila. ,   PLEASE schedule follow up appointment for after procedure.

## 2024-07-08 NOTE — TELEPHONE ENCOUNTER
Procedure:  ERCP  Scheduled date of procedure (as of today): 8/27/24  Physician performing procedure:  Dr. Wong   Location of procedure: [place]   Instructions reviewed with patient by:  Erma - mailed   Clearances:  n/a

## 2024-07-09 NOTE — RESULT ENCOUNTER NOTE
Can you please call the patient and let him know that besides his glucose being elevated, from a GI and liver standpoint his blood work from yesterday was completely normal and that his liver enzymes have decreased back down to normal.     He is to proceed with the rest of the treatment plan we discussed at his last office visit and follow-up as scheduled.

## 2024-07-10 ENCOUNTER — TELEPHONE (OUTPATIENT)
Dept: GASTROENTEROLOGY | Facility: CLINIC | Age: 64
End: 2024-07-10

## 2024-07-10 ENCOUNTER — TELEPHONE (OUTPATIENT)
Age: 64
End: 2024-07-10

## 2024-07-10 NOTE — TELEPHONE ENCOUNTER
Left detailed message about blood work results. Noticed no office follow up with Toby scheduled yet. Asked him to call to set up.

## 2024-07-10 NOTE — TELEPHONE ENCOUNTER
----- Message from ABRAM Avila sent at 7/9/2024  5:57 PM EDT -----  Can you please call the patient and let him know that besides his glucose being elevated, from a GI and liver standpoint his blood work from yesterday was completely normal and that his liver enzymes have decreased back down to normal.     He is to proceed with the rest of the treatment plan we discussed at his last office visit and follow-up as scheduled.

## 2024-08-27 ENCOUNTER — HOSPITAL ENCOUNTER (OUTPATIENT)
Dept: GASTROENTEROLOGY | Facility: HOSPITAL | Age: 64
Setting detail: OUTPATIENT SURGERY
Discharge: HOME | End: 2024-08-27
Payer: COMMERCIAL

## 2024-08-27 ENCOUNTER — HOSPITAL ENCOUNTER (OUTPATIENT)
Dept: RADIOLOGY | Facility: HOSPITAL | Age: 64
Discharge: HOME | End: 2024-08-27
Attending: INTERNAL MEDICINE
Payer: COMMERCIAL

## 2024-08-27 ENCOUNTER — ANESTHESIA EVENT (OUTPATIENT)
Dept: GASTROENTEROLOGY | Facility: HOSPITAL | Age: 64
End: 2024-08-27

## 2024-08-27 ENCOUNTER — ANESTHESIA (OUTPATIENT)
Dept: GASTROENTEROLOGY | Facility: HOSPITAL | Age: 64
End: 2024-08-27

## 2024-08-27 VITALS
HEIGHT: 68 IN | WEIGHT: 200 LBS | SYSTOLIC BLOOD PRESSURE: 125 MMHG | RESPIRATION RATE: 20 BRPM | OXYGEN SATURATION: 97 % | HEART RATE: 75 BPM | BODY MASS INDEX: 30.31 KG/M2 | DIASTOLIC BLOOD PRESSURE: 73 MMHG | TEMPERATURE: 97.9 F

## 2024-08-27 DIAGNOSIS — R74.01 TRANSAMINITIS: ICD-10-CM

## 2024-08-27 DIAGNOSIS — K85.10 ACUTE BILIARY PANCREATITIS WITHOUT INFECTION OR NECROSIS: ICD-10-CM

## 2024-08-27 PROCEDURE — 43264 ERCP REMOVE DUCT CALCULI: CPT | Performed by: INTERNAL MEDICINE

## 2024-08-27 PROCEDURE — 74328 X-RAY BILE DUCT ENDOSCOPY: CPT

## 2024-08-27 PROCEDURE — C1769 GUIDE WIRE: HCPCS

## 2024-08-27 PROCEDURE — 43275 ERCP REMOVE FORGN BODY DUCT: CPT | Performed by: INTERNAL MEDICINE

## 2024-08-27 PROCEDURE — 43262 ENDO CHOLANGIOPANCREATOGRAPH: CPT | Performed by: INTERNAL MEDICINE

## 2024-08-27 RX ORDER — PROPOFOL 10 MG/ML
INJECTION, EMULSION INTRAVENOUS AS NEEDED
Status: DISCONTINUED | OUTPATIENT
Start: 2024-08-27 | End: 2024-08-27

## 2024-08-27 RX ORDER — ONDANSETRON 2 MG/ML
INJECTION INTRAMUSCULAR; INTRAVENOUS AS NEEDED
Status: DISCONTINUED | OUTPATIENT
Start: 2024-08-27 | End: 2024-08-27

## 2024-08-27 RX ORDER — FENTANYL CITRATE 50 UG/ML
INJECTION, SOLUTION INTRAMUSCULAR; INTRAVENOUS AS NEEDED
Status: DISCONTINUED | OUTPATIENT
Start: 2024-08-27 | End: 2024-08-27

## 2024-08-27 RX ORDER — DIPHENOXYLATE HYDROCHLORIDE AND ATROPINE SULFATE 2.5; .025 MG/1; MG/1
1 TABLET ORAL DAILY
COMMUNITY

## 2024-08-27 RX ORDER — EPHEDRINE SULFATE 50 MG/ML
INJECTION INTRAVENOUS AS NEEDED
Status: DISCONTINUED | OUTPATIENT
Start: 2024-08-27 | End: 2024-08-27

## 2024-08-27 RX ORDER — ROCURONIUM BROMIDE 10 MG/ML
INJECTION, SOLUTION INTRAVENOUS AS NEEDED
Status: DISCONTINUED | OUTPATIENT
Start: 2024-08-27 | End: 2024-08-27

## 2024-08-27 RX ORDER — LIDOCAINE HYDROCHLORIDE 20 MG/ML
INJECTION, SOLUTION EPIDURAL; INFILTRATION; INTRACAUDAL; PERINEURAL AS NEEDED
Status: DISCONTINUED | OUTPATIENT
Start: 2024-08-27 | End: 2024-08-27

## 2024-08-27 RX ORDER — SODIUM CHLORIDE, SODIUM LACTATE, POTASSIUM CHLORIDE, CALCIUM CHLORIDE 600; 310; 30; 20 MG/100ML; MG/100ML; MG/100ML; MG/100ML
INJECTION, SOLUTION INTRAVENOUS CONTINUOUS PRN
Status: DISCONTINUED | OUTPATIENT
Start: 2024-08-27 | End: 2024-08-27

## 2024-08-27 RX ORDER — FAMOTIDINE 10 MG
10 TABLET ORAL DAILY PRN
COMMUNITY

## 2024-08-27 RX ORDER — SUCCINYLCHOLINE/SOD CL,ISO/PF 100 MG/5ML
SYRINGE (ML) INTRAVENOUS AS NEEDED
Status: DISCONTINUED | OUTPATIENT
Start: 2024-08-27 | End: 2024-08-27

## 2024-08-27 RX ADMIN — SODIUM CHLORIDE, SODIUM LACTATE, POTASSIUM CHLORIDE, AND CALCIUM CHLORIDE: .6; .31; .03; .02 INJECTION, SOLUTION INTRAVENOUS at 12:56

## 2024-08-27 RX ADMIN — FENTANYL CITRATE 50 MCG: 50 INJECTION INTRAMUSCULAR; INTRAVENOUS at 13:13

## 2024-08-27 RX ADMIN — IOHEXOL 5 ML: 240 INJECTION, SOLUTION INTRATHECAL; INTRAVASCULAR; INTRAVENOUS; ORAL at 13:30

## 2024-08-27 RX ADMIN — LIDOCAINE HYDROCHLORIDE 100 MG: 20 INJECTION, SOLUTION EPIDURAL; INFILTRATION; INTRACAUDAL at 13:02

## 2024-08-27 RX ADMIN — ROCURONIUM BROMIDE 25 MG: 10 INJECTION, SOLUTION INTRAVENOUS at 13:06

## 2024-08-27 RX ADMIN — FENTANYL CITRATE 50 MCG: 50 INJECTION INTRAMUSCULAR; INTRAVENOUS at 13:02

## 2024-08-27 RX ADMIN — ONDANSETRON 4 MG: 2 INJECTION INTRAMUSCULAR; INTRAVENOUS at 13:02

## 2024-08-27 RX ADMIN — PROPOFOL 200 MG: 10 INJECTION, EMULSION INTRAVENOUS at 13:02

## 2024-08-27 RX ADMIN — SUGAMMADEX 200 MG: 100 INJECTION, SOLUTION INTRAVENOUS at 13:36

## 2024-08-27 RX ADMIN — Medication 100 MG: at 13:02

## 2024-08-27 RX ADMIN — EPHEDRINE SULFATE 5 MG: 50 INJECTION, SOLUTION INTRAVENOUS at 13:13

## 2024-08-27 NOTE — ANESTHESIA POSTPROCEDURE EVALUATION
Post-Op Assessment Note    CV Status:  Stable    Pain management: adequate       Mental Status:  Alert and awake   Hydration Status:  Euvolemic   PONV Controlled:  Controlled   Airway Patency:  Patent     Post Op Vitals Reviewed: Yes    No anethesia notable event occurred.    Staff: Anesthesiologist, CRNA               /79 (08/27/24 1349)    Temp 97.9 °F (36.6 °C) (08/27/24 1349)    Pulse 83 (08/27/24 1349)   Resp 20 (08/27/24 1349)    SpO2 94 % (08/27/24 1349)

## 2024-08-27 NOTE — H&P
"History and Physical - [facility]  Don Valdez 63 y.o. male MRN: [id number]                  HPI: Don Valdez is a 63 y.o. year old male who presents for ERCP      REVIEW OF SYSTEMS: Per the HPI, and otherwise unremarkable.    Historical Information   Past Medical History:   Diagnosis Date    Gastroesophageal reflux disease 7/2/2024    GERD (gastroesophageal reflux disease)     High cholesterol     Restless leg syndrome      Past Surgical History:   Procedure Laterality Date    CARPAL TUNNEL RELEASE Bilateral     CHOLECYSTECTOMY LAPAROSCOPIC N/A 5/23/2024    Procedure: CHOLECYSTECTOMY LAPAROSCOPIC W/ INTRAOP CHOLANGIOGRAM;  Surgeon: Don Bethea MD;  Location: [facility];  Service: General    ORIF FINGER / THUMB FRACTURE       Social History   Social History     Substance and Sexual Activity   Alcohol Use Not Currently     Social History     Substance and Sexual Activity   Drug Use Not Currently     Social History     Tobacco Use   Smoking Status Never   Smokeless Tobacco Never     No family history on file.    Meds/Allergies       Current Outpatient Medications:     gabapentin (NEURONTIN) 300 mg capsule    pravastatin (PRAVACHOL) 10 mg tablet    Allergies   Allergen Reactions    Tetracycline Rash       Objective     /91   Pulse 87   Resp 20   Ht 5' 8\" (1.727 m)   Wt 90.7 kg (200 lb)   SpO2 99%   BMI 30.41 kg/m²       PHYSICAL EXAM    Gen: NAD  Head: NCAT  CV: RRR  CHEST: Clear  ABD: soft, NT/ND  EXT: no edema      ASSESSMENT/PLAN:  This is a 63 y.o. year old male here for ERCP, and he is stable and optimized for his procedure.        "

## 2024-08-27 NOTE — ANESTHESIA PREPROCEDURE EVALUATION
Procedure:  ERCP    Relevant Problems   CARDIO   (+) Hyperlipidemia      GI/HEPATIC   (+) Acute biliary pancreatitis without infection or necrosis   (+) Gastroesophageal reflux disease        Physical Exam    Airway    Mallampati score: II  TM Distance: >3 FB  Neck ROM: full     Dental    upper dentures and lower dentures    Cardiovascular  Cardiovascular exam normal    Pulmonary  Pulmonary exam normal     Other Findings        Anesthesia Plan  ASA Score- 2     Anesthesia Type- general with ASA Monitors.         Additional Monitors:     Airway Plan: ETT.           Plan Factors-    Chart reviewed. EKG reviewed.  Existing labs reviewed. Patient summary reviewed.                  Induction- intravenous.    Postoperative Plan-         Informed Consent- Anesthetic plan and risks discussed with patient.  I personally reviewed this patient with the CRNA. Discussed and agreed on the Anesthesia Plan with the CRNA..

## 2025-03-11 ENCOUNTER — APPOINTMENT (OUTPATIENT)
Age: 65
End: 2025-03-11
Payer: COMMERCIAL

## 2025-03-11 DIAGNOSIS — E78.5 HYPERLIPIDEMIA, UNSPECIFIED HYPERLIPIDEMIA TYPE: ICD-10-CM

## 2025-03-11 DIAGNOSIS — N42.9 DISEASE OF PROSTATE: ICD-10-CM

## 2025-03-11 DIAGNOSIS — R73.03 DIABETES MELLITUS, LATENT: ICD-10-CM

## 2025-03-11 DIAGNOSIS — Z79.899 NEED FOR PROPHYLACTIC CHEMOTHERAPY: ICD-10-CM

## 2025-03-11 LAB
ALBUMIN SERPL BCG-MCNC: 4 G/DL (ref 3.5–5)
ALP SERPL-CCNC: 56 U/L (ref 34–104)
ALT SERPL W P-5'-P-CCNC: 25 U/L (ref 7–52)
ANION GAP SERPL CALCULATED.3IONS-SCNC: 9 MMOL/L (ref 4–13)
AST SERPL W P-5'-P-CCNC: 23 U/L (ref 13–39)
BILIRUB SERPL-MCNC: 0.5 MG/DL (ref 0.2–1)
BUN SERPL-MCNC: 14 MG/DL (ref 5–25)
CALCIUM SERPL-MCNC: 9.2 MG/DL (ref 8.4–10.2)
CHLORIDE SERPL-SCNC: 105 MMOL/L (ref 96–108)
CHOLEST SERPL-MCNC: 207 MG/DL (ref ?–200)
CO2 SERPL-SCNC: 24 MMOL/L (ref 21–32)
CREAT SERPL-MCNC: 0.92 MG/DL (ref 0.6–1.3)
ERYTHROCYTE [DISTWIDTH] IN BLOOD BY AUTOMATED COUNT: 13.4 % (ref 11.6–15.1)
EST. AVERAGE GLUCOSE BLD GHB EST-MCNC: 151 MG/DL
GFR SERPL CREATININE-BSD FRML MDRD: 87 ML/MIN/1.73SQ M
GLUCOSE P FAST SERPL-MCNC: 133 MG/DL (ref 65–99)
HBA1C MFR BLD: 6.9 %
HCT VFR BLD AUTO: 44.7 % (ref 36.5–49.3)
HDLC SERPL-MCNC: 48 MG/DL
HGB BLD-MCNC: 15.1 G/DL (ref 12–17)
LDLC SERPL CALC-MCNC: 108 MG/DL (ref 0–100)
MCH RBC QN AUTO: 31 PG (ref 26.8–34.3)
MCHC RBC AUTO-ENTMCNC: 33.8 G/DL (ref 31.4–37.4)
MCV RBC AUTO: 92 FL (ref 82–98)
NONHDLC SERPL-MCNC: 159 MG/DL
PLATELET # BLD AUTO: 285 THOUSANDS/UL (ref 149–390)
PMV BLD AUTO: 10.3 FL (ref 8.9–12.7)
POTASSIUM SERPL-SCNC: 3.9 MMOL/L (ref 3.5–5.3)
PROT SERPL-MCNC: 7.3 G/DL (ref 6.4–8.4)
PSA SERPL-MCNC: 0.3 NG/ML (ref 0–4)
RBC # BLD AUTO: 4.87 MILLION/UL (ref 3.88–5.62)
SODIUM SERPL-SCNC: 138 MMOL/L (ref 135–147)
TRIGL SERPL-MCNC: 253 MG/DL (ref ?–150)
WBC # BLD AUTO: 8.5 THOUSAND/UL (ref 4.31–10.16)

## 2025-03-11 PROCEDURE — 36415 COLL VENOUS BLD VENIPUNCTURE: CPT

## 2025-03-11 PROCEDURE — 80061 LIPID PANEL: CPT

## 2025-03-11 PROCEDURE — 80053 COMPREHEN METABOLIC PANEL: CPT

## 2025-03-11 PROCEDURE — 85027 COMPLETE CBC AUTOMATED: CPT

## 2025-03-11 PROCEDURE — 84153 ASSAY OF PSA TOTAL: CPT

## 2025-03-11 PROCEDURE — 83036 HEMOGLOBIN GLYCOSYLATED A1C: CPT

## 2025-06-25 ENCOUNTER — APPOINTMENT (OUTPATIENT)
Age: 65
End: 2025-06-25
Attending: INTERNAL MEDICINE
Payer: COMMERCIAL

## 2025-06-25 DIAGNOSIS — E11.9 TYPE 2 DIABETES MELLITUS WITHOUT COMPLICATION, UNSPECIFIED WHETHER LONG TERM INSULIN USE (HCC): ICD-10-CM

## 2025-06-25 LAB
CREAT UR-MCNC: 62.7 MG/DL
EST. AVERAGE GLUCOSE BLD GHB EST-MCNC: 140 MG/DL
HBA1C MFR BLD: 6.5 %
MICROALBUMIN UR-MCNC: <7 MG/L

## 2025-06-25 PROCEDURE — 82043 UR ALBUMIN QUANTITATIVE: CPT

## 2025-06-25 PROCEDURE — 36415 COLL VENOUS BLD VENIPUNCTURE: CPT

## 2025-06-25 PROCEDURE — 83036 HEMOGLOBIN GLYCOSYLATED A1C: CPT

## 2025-06-25 PROCEDURE — 82570 ASSAY OF URINE CREATININE: CPT

## (undated) DEVICE — TUBE SET SMOKE EVAC PNEUMOCLEAR HIGH FLOW

## (undated) DEVICE — GLOVE SRG BIOGEL 7

## (undated) DEVICE — SYRINGE 20ML LL

## (undated) DEVICE — TRANSPOSAL ULTRAFLEX DUO/QUAD ULTRA CART MANIFOLD

## (undated) DEVICE — ASTOUND IMPERVIOUS SURGICAL GOWN: Brand: CONVERTORS

## (undated) DEVICE — TROCAR SITE CLOSURE DEVICE: Brand: ENDO CLOSE

## (undated) DEVICE — 2, DISPOSABLE SUCTION/IRRIGATOR WITHOUT DISPOSABLE TIP: Brand: STRYKEFLOW

## (undated) DEVICE — CLIP APPLIER WITH CLIP LOGIC TECHNOLOGY: Brand: ENDO CLIP III

## (undated) DEVICE — INTENDED FOR TISSUE SEPARATION, AND OTHER PROCEDURES THAT REQUIRE A SHARP SURGICAL BLADE TO PUNCTURE OR CUT.: Brand: BARD-PARKER ® CARBON RIB-BACK BLADES

## (undated) DEVICE — LAPROSCOPIC CHOLANGIOGR. CATH KIT

## (undated) DEVICE — NEEDLE 25G X 1 1/2

## (undated) DEVICE — ADHESIVE SKIN HIGH VISCOSITY EXOFIN 1ML

## (undated) DEVICE — LAPAROSCOPIC SCISSORS: Brand: EPIX LAPAROSCOPIC SCISSORS

## (undated) DEVICE — TROCAR: Brand: KII FIOS FIRST ENTRY

## (undated) DEVICE — SUT MONOCRYL 4-0 PS-2 27 IN Y426H

## (undated) DEVICE — TROCAR: Brand: KII SLEEVE

## (undated) DEVICE — 5 MM CURVED DISSECTORS WITH MONOPOLAR CAUTERY: Brand: ENDOPATH

## (undated) DEVICE — MICRO HVTSA, 0.5G AND HVTSA SOURCEMARK PRODUCT CODE M1206 AND M1206-01: Brand: EXOFIN MICRO HVTSA, 0.5G

## (undated) DEVICE — CHLORAPREP HI-LITE 26ML ORANGE

## (undated) DEVICE — DRAPE C-ARM X-RAY

## (undated) DEVICE — ENDOPOUCH RETRIEVER SPECIMEN RETRIEVAL BAGS: Brand: ENDOPOUCH RETRIEVER

## (undated) DEVICE — HARMONIC 1100 SHEARS, 36CM SHAFT LENGTH: Brand: HARMONIC

## (undated) DEVICE — GLOVE INDICATOR PI UNDERGLOVE SZ 7.5 BLUE

## (undated) DEVICE — GLOVE INDICATOR PI UNDERGLOVE SZ 7 BLUE

## (undated) DEVICE — SUT VICRYL 0 UR-6 27 IN J603H